# Patient Record
Sex: MALE | Race: WHITE | NOT HISPANIC OR LATINO | Employment: UNEMPLOYED | URBAN - METROPOLITAN AREA
[De-identification: names, ages, dates, MRNs, and addresses within clinical notes are randomized per-mention and may not be internally consistent; named-entity substitution may affect disease eponyms.]

---

## 2019-06-25 ENCOUNTER — OCCUPATIONAL HEALTH (OUTPATIENT)
Dept: URGENT CARE | Facility: CLINIC | Age: 49
End: 2019-06-25

## 2019-06-25 DIAGNOSIS — Z02.1 PRE-EMPLOYMENT EXAMINATION: Primary | ICD-10-CM

## 2022-01-07 ENCOUNTER — TELEPHONE (OUTPATIENT)
Dept: NEUROLOGY | Facility: HOSPITAL | Age: 52
End: 2022-01-07
Payer: MEDICAID

## 2022-01-07 NOTE — TELEPHONE ENCOUNTER
----- Message from Vanessa Luciano MA sent at 1/7/2022  8:49 AM CST -----  Type:  Needs Medical Advice    Who Called: pt's wife  Regarding: needs to schedule appt. Referred by [facility]  Would the patient rather a call back or a response via [facility] [facility]? Call back  Best Call Back Number: [phone]  Additional Information: n/a

## 2022-01-07 NOTE — TELEPHONE ENCOUNTER
----- Message from Venessa Justice sent at 1/7/2022  2:45 PM CST -----  Type:  Needs Medical Advice    Who Called: pt called  Would the patient rather a call back or a response via [employer]? Call back  Best Call Back Number: [phone]  Additional Information: pt would like to schedule an appt with . Please call pt to advice. Referral was faxed today

## 2022-01-07 NOTE — TELEPHONE ENCOUNTER
Pt requesting gi clinic appt from referral by SHEILA.  Pt advised to have referral sent to this clinic.  Pt given clinic fax and repeated number correctly.

## 2022-01-07 NOTE — TELEPHONE ENCOUNTER
Wife informed once received, Dr. Lloyd will review referral and give recommendations.  Pt acknowledged understanding.

## 2022-01-12 ENCOUNTER — TELEPHONE (OUTPATIENT)
Dept: NEUROLOGY | Facility: HOSPITAL | Age: 52
End: 2022-01-12
Payer: MEDICAID

## 2022-01-12 NOTE — TELEPHONE ENCOUNTER
----- Message from Venessa Justice sent at 1/12/2022 10:44 AM CST -----  Contact: [phone]  Type:  Needs Medical Advice    Who Called: pt called  Would the patient rather a call back or a response via [employer]? Call back  Best Call Back Number: [phone]  Additional Information: Pt calling to check on referral status. Please call pt to advice

## 2022-01-18 ENCOUNTER — TELEPHONE (OUTPATIENT)
Dept: NEUROLOGY | Facility: HOSPITAL | Age: 52
End: 2022-01-18
Payer: MEDICAID

## 2022-01-18 NOTE — TELEPHONE ENCOUNTER
Preethi, wife, informed referral forward to [facility], per Dr. Lloyd.   Acknowledged understanding.

## 2022-07-02 ENCOUNTER — HOSPITAL ENCOUNTER (EMERGENCY)
Facility: OTHER | Age: 52
Discharge: HOME | End: 2022-07-02
Attending: EMERGENCY MEDICINE
Payer: MEDICAID

## 2022-07-02 VITALS
TEMPERATURE: 99 F | DIASTOLIC BLOOD PRESSURE: 75 MMHG | HEART RATE: 96 BPM | BODY MASS INDEX: 27.22 KG/M2 | HEIGHT: 72 IN | SYSTOLIC BLOOD PRESSURE: 134 MMHG | RESPIRATION RATE: 18 BRPM | OXYGEN SATURATION: 98 % | WEIGHT: 201 LBS

## 2022-07-02 DIAGNOSIS — K40.90 LEFT INGUINAL HERNIA: Primary | ICD-10-CM

## 2022-07-02 PROCEDURE — 99283 EMERGENCY DEPT VISIT LOW MDM: CPT | Mod: 25

## 2022-07-02 PROCEDURE — 25000003 PHARM REV CODE 250: Performed by: EMERGENCY MEDICINE

## 2022-07-02 RX ORDER — IBUPROFEN 600 MG/1
600 TABLET ORAL EVERY 6 HOURS PRN
Qty: 20 TABLET | Refills: 0 | Status: SHIPPED | OUTPATIENT
Start: 2022-07-02 | End: 2023-09-20 | Stop reason: ALTCHOICE

## 2022-07-02 RX ORDER — IBUPROFEN 600 MG/1
600 TABLET ORAL
Status: COMPLETED | OUTPATIENT
Start: 2022-07-02 | End: 2022-07-02

## 2022-07-02 RX ADMIN — IBUPROFEN 600 MG: 600 TABLET, FILM COATED ORAL at 07:07

## 2022-07-02 NOTE — ED PROVIDER NOTES
"Encounter Date: 7/2/2022       History     Chief Complaint   Patient presents with    Inguinal Hernia     Chronic left inguinal hernia pain x 18 hours.      51-year-old male presents with complaint of left inguinal hernia.  He reports he was diagnosed with it a few months ago.  He is currently a resident at the [facility] for alcohol rehabilitation, and reports that he is been doing a lot of exercise and working out.  Since yesterday the hernia has become more inflamed and painful.  Current pain is rated 10/10.        Review of patient's allergies indicates:  No Known Allergies  History reviewed. No pertinent past medical history.  History reviewed. No pertinent surgical history.  History reviewed. No pertinent family history.     Review of Systems    Physical Exam     Initial Vitals [07/02/22 0649]   BP Pulse Resp Temp SpO2   134/75 96 18 98.9 °F (37.2 °C) 98 %      MAP       --         Physical Exam    ED Course   Procedures  Labs Reviewed - No data to display       Imaging Results    None          Medications - No data to display                       Clinical Impression:    ***Please document a Clinical Impression and click the "Refresh" button to refresh your note and automatically pull in before signing.***           "

## 2022-07-02 NOTE — ED TRIAGE NOTES
"Chief Complaint   Patient presents with    Inguinal Hernia     Chronic left inguinal hernia pain x 18 hours.      Pt presents to ED with chronic left inguinal hernia pain x1 day. Pt states he "has been working out hard" and that's when the pain started. AAOX4, currently staying at the [facility] in recovery for ETOH.   "

## 2022-07-02 NOTE — ED PROVIDER NOTES
"Encounter Date: 7/2/2022    SCRIBE #1 NOTE: I, Justinnael Mccauley, am scribing for, and in the presence of, Pat Giron MD.       History     Chief Complaint   Patient presents with    Inguinal Hernia     Chronic left inguinal hernia pain x 18 hours.      Time seen by provider: 7:04 AM    This is a 51 y.o. male with a PMHx of COPD who presents with complaint of chronic left inguinal hernia pain worsening with onset 1 day ago. Patient reports that he was diagnosed with an inguinal hernia 2 months ago and is currently at [facility] for alcohol rehabilitation. He notes that he has been consistently working out; he reports that after working out yesterday the hernia was "3 times the previous size" and when he coughed it would "jump." He states that it is hard to ambulate because of the hernia's increase in size and the pain. He notes the pain being a 5-6/10. He adds that he wears loose underwear. Patient denies cough, rhinorrhea, and sore throat. He denies any vomiting and constipation. Patient denies any drug use and has not had alcohol in 28 days, but he reports that he smokes.     The history is provided by the patient.     Review of patient's allergies indicates:  No Known Allergies  Past Medical History:   Diagnosis Date    COPD (chronic obstructive pulmonary disease)      Past Surgical History:   Procedure Laterality Date    HERNIA REPAIR       History reviewed. No pertinent family history.  Social History     Tobacco Use    Smoking status: Current Every Day Smoker     Types: Cigarettes    Smokeless tobacco: Never Used   Substance Use Topics    Alcohol use: Not Currently    Drug use: Never     Review of Systems   Constitutional: Negative for chills and fever.   HENT: Negative for congestion, rhinorrhea and sore throat.    Eyes: Negative for visual disturbance.   Respiratory: Negative for cough and shortness of breath.    Cardiovascular: Negative for chest pain and palpitations.   Gastrointestinal: " Positive for abdominal pain (Left inguinal hernia). Negative for constipation, diarrhea and vomiting.   Genitourinary: Negative for decreased urine volume, dysuria and frequency.   Musculoskeletal: Negative for joint swelling, neck pain and neck stiffness.   Skin: Negative for rash and wound.   Neurological: Negative for weakness, numbness and headaches.   Psychiatric/Behavioral: Negative for behavioral problems and confusion.       Physical Exam     Initial Vitals [07/02/22 0649]   BP Pulse Resp Temp SpO2   134/75 96 18 98.9 °F (37.2 °C) 98 %      MAP       --         Physical Exam    Constitutional: He appears well-developed and well-nourished.   HENT:   Head: Normocephalic and atraumatic.   Nose: Nose normal.   Mouth/Throat: Oropharynx is clear and moist.   Eyes: Conjunctivae and EOM are normal. Pupils are equal, round, and reactive to light.   Neck: Neck supple.   Normal range of motion.  Cardiovascular: Normal rate and regular rhythm. Exam reveals no gallop and no friction rub.    No murmur heard.  Pulmonary/Chest: Breath sounds normal. No respiratory distress. He has no wheezes. He has no rales.   Abdominal: Abdomen is soft. Bowel sounds are normal. There is no abdominal tenderness. A hernia is present. Hernia confirmed positive in the left inguinal area (Presented with valsalva. Easily reducible). There is no rebound and no guarding.   Musculoskeletal:         General: No tenderness or edema.      Cervical back: Normal range of motion and neck supple.     Neurological: He is alert and oriented to person, place, and time. He has normal strength. No cranial nerve deficit or sensory deficit. Gait normal. GCS score is 15. GCS eye subscore is 4. GCS verbal subscore is 5. GCS motor subscore is 6.   Skin: Skin is warm and dry. No rash noted.   Psychiatric: He has a normal mood and affect. His speech is normal and behavior is normal.         ED Course   Procedures  Labs Reviewed - No data to display       Imaging  Results    None          Medications   ibuprofen tablet 600 mg (600 mg Oral Given 7/2/22 0732)     Medical Decision Making:   History:   Old Medical Records: I decided to obtain old medical records.  ED Management:  Urgent evaluation a 51-year-old male who presents with complaint of left groin pain, history of hernia.  Vital signs are benign, afebrile.  On exam no hernia is obvious until Valsalva.  Hernia is easily reducible and soft.  No evidence of incarceration or strangulation or bowel obstruction.  He has advise rest, ibuprofen or Tylenol if needed for pain.  He is also given an ice pack.  He is encouraged close follow-up with General surgery, but no emergent surgical intervention is necessary at this time.  He is advised to avoid heavy lifting and activity that exacerbates his hernia.  He is discharged back to [facility] in good condition.          Scribe Attestation:   Scribe #1: I performed the above scribed service and the documentation accurately describes the services I performed. I attest to the accuracy of the note.               Physician Attestation for Scribe: I, Pat Giron, reviewed documentation as scribed in my presence, which is both accurate and complete.  Clinical Impression:   Final diagnoses:  [K40.90] Left inguinal hernia (Primary)          ED Disposition Condition    Discharge Stable        ED Prescriptions     Medication Sig Dispense Start Date End Date Auth. Provider    ibuprofen (ADVIL,MOTRIN) 600 MG tablet Take 1 tablet (600 mg total) by mouth every 6 (six) hours as needed for Pain. 20 tablet 7/2/2022  Pat Giron MD        Follow-up Information     Follow up With Specialties Details Why Contact Info    Huey Cooper Jr., MD General Surgery, Vascular Surgery Schedule an appointment as soon as possible for a visit  As needed - or with the general surgeon of your choice [address]  [address]  [facility] [address]  [phone]      [facility] Emergency  Medicine  As needed, If symptoms worsen [address]  [facility] [address]  [phone]           Pat Giron MD  07/02/22 0899

## 2023-07-07 PROBLEM — J44.9 STAGE 2 MODERATE COPD BY GOLD CLASSIFICATION: Status: ACTIVE | Noted: 2023-07-07

## 2023-07-13 ENCOUNTER — HOSPITAL ENCOUNTER (OUTPATIENT)
Dept: RADIOLOGY | Facility: HOSPITAL | Age: 53
Discharge: HOME | End: 2023-07-13
Attending: NURSE PRACTITIONER
Payer: MEDICAID

## 2023-07-13 DIAGNOSIS — J44.9 STAGE 2 MODERATE COPD BY GOLD CLASSIFICATION: ICD-10-CM

## 2023-07-13 DIAGNOSIS — R91.1 SOLITARY PULMONARY NODULE: ICD-10-CM

## 2023-07-13 PROCEDURE — 71250 CT THORAX DX C-: CPT | Mod: TC

## 2023-07-13 PROCEDURE — 71250 CT CHEST WITHOUT CONTRAST: ICD-10-PCS | Mod: 26,,, | Performed by: RADIOLOGY

## 2023-07-13 PROCEDURE — 71250 CT THORAX DX C-: CPT | Mod: 26,,, | Performed by: RADIOLOGY

## 2023-09-20 ENCOUNTER — OFFICE VISIT (OUTPATIENT)
Dept: PSYCHIATRY | Facility: CLINIC | Age: 53
End: 2023-09-20
Payer: MEDICAID

## 2023-09-20 VITALS
WEIGHT: 193.31 LBS | OXYGEN SATURATION: 100 % | BODY MASS INDEX: 26.18 KG/M2 | HEIGHT: 72 IN | SYSTOLIC BLOOD PRESSURE: 132 MMHG | RESPIRATION RATE: 17 BRPM | HEART RATE: 87 BPM | DIASTOLIC BLOOD PRESSURE: 87 MMHG

## 2023-09-20 DIAGNOSIS — F10.930 ALCOHOL WITHDRAWAL SYNDROME WITHOUT COMPLICATION: ICD-10-CM

## 2023-09-20 DIAGNOSIS — F33.1 MDD (MAJOR DEPRESSIVE DISORDER), RECURRENT EPISODE, MODERATE: ICD-10-CM

## 2023-09-20 DIAGNOSIS — F41.1 GAD (GENERALIZED ANXIETY DISORDER): Primary | ICD-10-CM

## 2023-09-20 DIAGNOSIS — F10.10 ALCOHOL ABUSE: ICD-10-CM

## 2023-09-20 PROCEDURE — 3079F DIAST BP 80-89 MM HG: CPT | Mod: SA,HB,CPTII,

## 2023-09-20 PROCEDURE — 99213 OFFICE O/P EST LOW 20 MIN: CPT | Mod: PBBFAC

## 2023-09-20 PROCEDURE — 90792 PSYCH DIAG EVAL W/MED SRVCS: CPT | Mod: SA,HB,,

## 2023-09-20 PROCEDURE — 3008F PR BODY MASS INDEX (BMI) DOCUMENTED: ICD-10-PCS | Mod: SA,HB,CPTII,

## 2023-09-20 PROCEDURE — 1159F MED LIST DOCD IN RCRD: CPT | Mod: SA,HB,CPTII,

## 2023-09-20 PROCEDURE — 90792 PR PSYCHIATRIC DIAGNOSTIC EVALUATION W/MEDICAL SERVICES: ICD-10-PCS | Mod: SA,HB,,

## 2023-09-20 PROCEDURE — 3079F PR MOST RECENT DIASTOLIC BLOOD PRESSURE 80-89 MM HG: ICD-10-PCS | Mod: SA,HB,CPTII,

## 2023-09-20 PROCEDURE — 3075F PR MOST RECENT SYSTOLIC BLOOD PRESS GE 130-139MM HG: ICD-10-PCS | Mod: SA,HB,CPTII,

## 2023-09-20 PROCEDURE — 1159F PR MEDICATION LIST DOCUMENTED IN MEDICAL RECORD: ICD-10-PCS | Mod: SA,HB,CPTII,

## 2023-09-20 PROCEDURE — 3075F SYST BP GE 130 - 139MM HG: CPT | Mod: SA,HB,CPTII,

## 2023-09-20 PROCEDURE — 3008F BODY MASS INDEX DOCD: CPT | Mod: SA,HB,CPTII,

## 2023-09-20 PROCEDURE — 1160F PR REVIEW ALL MEDS BY PRESCRIBER/CLIN PHARMACIST DOCUMENTED: ICD-10-PCS | Mod: SA,HB,CPTII,

## 2023-09-20 PROCEDURE — 1160F RVW MEDS BY RX/DR IN RCRD: CPT | Mod: SA,HB,CPTII,

## 2023-09-20 PROCEDURE — 99999 PR PBB SHADOW E&M-EST. PATIENT-LVL III: ICD-10-PCS | Mod: PBBFAC,SA,HB,

## 2023-09-20 PROCEDURE — 99999 PR PBB SHADOW E&M-EST. PATIENT-LVL III: CPT | Mod: PBBFAC,SA,HB,

## 2023-09-20 RX ORDER — BUPROPION HYDROCHLORIDE 150 MG/1
150 TABLET ORAL DAILY
Qty: 30 TABLET | Refills: 1 | Status: SHIPPED | OUTPATIENT
Start: 2023-09-20 | End: 2023-12-01 | Stop reason: DRUGHIGH

## 2023-09-20 RX ORDER — DIAZEPAM 5 MG/1
5 TABLET ORAL EVERY 8 HOURS PRN
Qty: 90 TABLET | Refills: 0 | Status: SHIPPED | OUTPATIENT
Start: 2023-09-20 | End: 2023-10-20
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
## 2023-09-25 ENCOUNTER — TELEPHONE (OUTPATIENT)
Dept: PSYCHIATRY | Facility: CLINIC | Age: 53
End: 2023-09-25
Payer: MEDICAID

## 2023-09-25 NOTE — TELEPHONE ENCOUNTER
----- Message from Loreto Moreland LPN sent at 9/25/2023  9:15 AM CDT -----  Patient stating his pharmacy called stating they are needing a PA done for his valium.

## 2023-09-25 NOTE — TELEPHONE ENCOUNTER
Insurance denied PA for Valium, contacted the pharmacy and they said he can pay of it out of pocket if he would like to. It's $27. Contacted the patient and he stated he wanted to pay for it out of pocket, he will call back if anything else comes up.

## 2023-09-25 NOTE — TELEPHONE ENCOUNTER
----- Message from Lily Marc sent at 2023 10:14 AM CDT -----  Contact: PATIENT  Maicol Millan III  MRN: [id number]  : 1970  PCP: Arline, Primary Doctor  Home Phone      [phone]  Work Phone      Not on file.  Mobile          [phone]  Home Phone      Not on file.      MESSAGE: Patient is needing a return call regarding the Valium that Katya prescribed for him at his last office visit.  The pharmacy is telling him that in order for him not to be charged for the medication that the office needs to get it approved through the insurance.        Phone: [phone]

## 2023-11-28 ENCOUNTER — TELEPHONE (OUTPATIENT)
Dept: PSYCHIATRY | Facility: CLINIC | Age: 53
End: 2023-11-28

## 2023-11-28 NOTE — TELEPHONE ENCOUNTER
----- Message from Shadia Del Rosario sent at 2023  2:11 PM CST -----  Contact: Wife, Preethi Millan III  MRN: [id number]  : 1970  PCP: No, Primary Doctor  Home Phone      [phone]  Work Phone      Not on file.  Mobile          [phone]  Home Phone      Not on file.      MESSAGE: Patient has fallen into a depressed state and was advised to have his doctor call him as soon as possible    PHONE; [phone]

## 2023-11-28 NOTE — TELEPHONE ENCOUNTER
Patient calling states that he spoke with his counselor the other day and was informed by his counselor to call and speak with you regarding getting medication for depression. States he is sleeping for 12-14 hours a day, not wanting to get out the bed or do anything.

## 2023-12-01 ENCOUNTER — OFFICE VISIT (OUTPATIENT)
Dept: PSYCHIATRY | Facility: CLINIC | Age: 53
End: 2023-12-01
Payer: MEDICAID

## 2023-12-01 VITALS
HEART RATE: 91 BPM | WEIGHT: 197 LBS | HEIGHT: 72 IN | DIASTOLIC BLOOD PRESSURE: 86 MMHG | SYSTOLIC BLOOD PRESSURE: 110 MMHG | OXYGEN SATURATION: 97 % | BODY MASS INDEX: 26.68 KG/M2 | RESPIRATION RATE: 17 BRPM

## 2023-12-01 DIAGNOSIS — F33.2 MDD (MAJOR DEPRESSIVE DISORDER), RECURRENT SEVERE, WITHOUT PSYCHOSIS: Primary | ICD-10-CM

## 2023-12-01 DIAGNOSIS — R44.0 AUDITORY HALLUCINATIONS: ICD-10-CM

## 2023-12-01 DIAGNOSIS — F10.10 ALCOHOL ABUSE, DAILY USE: ICD-10-CM

## 2023-12-01 DIAGNOSIS — F41.1 GAD (GENERALIZED ANXIETY DISORDER): ICD-10-CM

## 2023-12-01 PROCEDURE — 90833 PSYTX W PT W E/M 30 MIN: CPT | Mod: SA,HB,,

## 2023-12-01 PROCEDURE — 3079F PR MOST RECENT DIASTOLIC BLOOD PRESSURE 80-89 MM HG: ICD-10-PCS | Mod: SA,HB,CPTII,

## 2023-12-01 PROCEDURE — 3008F BODY MASS INDEX DOCD: CPT | Mod: SA,HB,CPTII,

## 2023-12-01 PROCEDURE — 1160F PR REVIEW ALL MEDS BY PRESCRIBER/CLIN PHARMACIST DOCUMENTED: ICD-10-PCS | Mod: SA,HB,CPTII,

## 2023-12-01 PROCEDURE — 90833 PR PSYCHOTHERAPY W/PATIENT W/E&M, 30 MIN (ADD ON): ICD-10-PCS | Mod: SA,HB,,

## 2023-12-01 PROCEDURE — 3008F PR BODY MASS INDEX (BMI) DOCUMENTED: ICD-10-PCS | Mod: SA,HB,CPTII,

## 2023-12-01 PROCEDURE — 1159F MED LIST DOCD IN RCRD: CPT | Mod: SA,HB,CPTII,

## 2023-12-01 PROCEDURE — 99214 OFFICE O/P EST MOD 30 MIN: CPT | Mod: S$PBB,SA,HB,

## 2023-12-01 PROCEDURE — 99999 PR PBB SHADOW E&M-EST. PATIENT-LVL III: CPT | Mod: PBBFAC,SA,HB,

## 2023-12-01 PROCEDURE — 1159F PR MEDICATION LIST DOCUMENTED IN MEDICAL RECORD: ICD-10-PCS | Mod: SA,HB,CPTII,

## 2023-12-01 PROCEDURE — 99213 OFFICE O/P EST LOW 20 MIN: CPT | Mod: PBBFAC

## 2023-12-01 PROCEDURE — 3079F DIAST BP 80-89 MM HG: CPT | Mod: SA,HB,CPTII,

## 2023-12-01 PROCEDURE — 99999 PR PBB SHADOW E&M-EST. PATIENT-LVL III: ICD-10-PCS | Mod: PBBFAC,SA,HB,

## 2023-12-01 PROCEDURE — 99214 PR OFFICE/OUTPT VISIT, EST, LEVL IV, 30-39 MIN: ICD-10-PCS | Mod: S$PBB,SA,HB,

## 2023-12-01 PROCEDURE — 3074F SYST BP LT 130 MM HG: CPT | Mod: SA,HB,CPTII,

## 2023-12-01 PROCEDURE — 3074F PR MOST RECENT SYSTOLIC BLOOD PRESSURE < 130 MM HG: ICD-10-PCS | Mod: SA,HB,CPTII,

## 2023-12-01 PROCEDURE — 1160F RVW MEDS BY RX/DR IN RCRD: CPT | Mod: SA,HB,CPTII,

## 2023-12-01 RX ORDER — NALTREXONE HYDROCHLORIDE 50 MG/1
50 TABLET, FILM COATED ORAL DAILY
Qty: 30 TABLET | Refills: 0 | Status: SHIPPED | OUTPATIENT
Start: 2023-12-01 | End: 2023-12-31

## 2023-12-01 RX ORDER — BUPROPION HYDROCHLORIDE 300 MG/1
300 TABLET ORAL DAILY
Qty: 30 TABLET | Refills: 1 | Status: ON HOLD | OUTPATIENT
Start: 2023-12-01 | End: 2024-01-26

## 2023-12-01 NOTE — PROGRESS NOTES
"  Outpatient Psychiatry Follow-Up Visit (MD/NP)    12/1/2023    Clinical Status of Patient:  Outpatient (Ambulatory)    Chief Complaint:  Maicol Millan III is a 53 y.o. male who presents today for follow-up of depression and anxiety.  Met with patient.      Interval History and Content of Current Session:  Interim Events/Subjective Report/Content of Current Session:     Psychotherapy:  Target symptoms: recurrent depression, anxiety   Why chosen therapy is appropriate versus another modality: relevant to diagnosis, patient responds to this modality, evidence based practice  Outcome monitoring methods: self-report, observation  Therapeutic intervention type: insight oriented psychotherapy, behavior modifying psychotherapy, supportive psychotherapy, interactive psychotherapy  Topics discussed/themes: difficulty managing affect in interpersonal relationships, building skills sets for symptom management, symptom recognition  The patient's response to the intervention is reluctant. The patient's progress toward treatment goals is not progressing.   Duration of intervention: 17 minutes.      Psychotherapy Add-On + 91118  16-37 minutes   Time: 17 minutes  Participants: Patient     He has been non-compliant with treatment. He denies any adverse side effects.    He states he is "not doing good." He states " my depression is bad and I have been drinking a quarter of a gal of vodka daily."  He reports he has been sleeping "12-14 hours a day."    He reports auditory hallucinations and states he sometimes hears the TV when it is not on.    He reports his sobriety lasted for 18 days in September. Then in November he was sober for 6 days.    He states he would like to go into rehab but he is currently been seen by a Cardiologist and Pulmonologist for complaints of shortness of breath.   He states once he is cleared by these 2 specialties he will be able to check himself into a rehab.    No new psychological stressors have " occurred since his last appointment. His family is stable and supportive.   One new medical issue has occurred since his last appointment. He has been having problems with shortness of breath.     -he is still working on his diet and trying to do better at controlling his diet and weight     -he reports he sleeps on average 8 hours a night with awakening in early morning  -he reports his appetite is increased ,   He reports diminished mood, no elevation in mood; normal interest; no expansiveness; no irritability).   His concentration and energy remain normal.   He denies excessive, guilt and/or worry.    He denies any symptoms of humberto, or psychosis (no AVH, delusions, disorganizations, paranoia, ect.).  He denies suicidal/homicidal ideations. No psychosis noted.    We discussed med changes as below; the patient is in agreement to changing treatment as documented below.    In patient's chart review it was noted that patient is seeing two providers for complaint of depression. Discussed this with patient and recommended he see one provider for his mental illness medications as this is in his best interest and safer for him. Patient verbalized understanding.  Patient states he is not a danger to himself or anyone else and does not want to be admitted to an inpatient unit at this time.     Review of Systems   Psychiatric: Pertinent items are noted in this encounter narrative   Constitutional: No weight gain or loss   Musculoskeletal: Denies pain or stiffness in joints   Neurological: No weakness, sensory changes, seizures, confusion, memory loss, tremor, or other abnormal   Endocrine: Denies polydipsia or polyuria   Integumentary: No rashes or lacerations   Eyes: No exophthalmos, jaundice, or blindness   ENT: Denies dizziness, tinnitus or hearing loss    Respiratory: Denies shortness of breath   Cardiovascular: No chest pain or tachycardia   Gastrointestinal: Denies nausea, vomiting, diarrhea, constipation or  pain  Genitourinary: Denies dysuria, frequency, or sexual dysfunction   Hematologic/Lymphatic: Denies excessive bleeding, prolonged or excessive bleeding after invasive procedures, dental extraction or injury   Allergic/Immunologic: Denies allergic response to foods, animals or other materials at this time    Past Medical, Family and Social History: The patient's past medical, family and social history have been reviewed and updated as appropriate within the electronic medical record - see encounter notes.    Compliance: yes    Side effects: None    Risk Parameters:  Patient reports no suicidal ideation  Patient reports no homicidal ideation  Patient reports no self-injurious behavior  Patient reports no violent behavior    Exam (detailed: at least 9 elements; comprehensive: all 15 elements)   Constitutional  Vitals:  Most recent vital signs, dated less than 90 days prior to this appointment, were reviewed.   Vitals:    12/01/23 0944   BP: 110/86   Pulse: 91   Resp: 17   SpO2: 97%   Weight: 89.4 kg (197 lb)   Height: 6' (1.829 m)        General:  unremarkable, age appropriate, well nourished, neatly groomed, overweight     Musculoskeletal  Muscle Strength/Tone:  no spasicity, no rigidity, no cogwheeling, no flaccidity, no paratonia, no dyskinesia, no dystonia, no tremor, no tic, no choreoathetosis, no atrophy   Gait & Station:  non-ataxic     Psychiatric  Speech:  no latency; no press, spontaneous   Mood & Affect:  depressed  congruent and appropriate, sad   Thought Process:  normal and logical   Associations:  intact   Thought Content:  normal, no suicidality, no homicidality, delusions, or paranoia   Insight:  intact, has awareness of illness   Judgement: behavior is adequate to circumstances, age appropriate   Orientation:  grossly intact   Memory: intact for content of interview, grossly intact   Language: grossly intact   Attention Span & Concentration:  able to focus   Fund of Knowledge:  intact and  appropriate to age and level of education, familiar with aspects of current personal life     Assessment and Diagnosis   Status/Progress: Based on the examination today, the patient's problem(s) is/are worsening.  New problems have been presented today.   Co-morbidities and Lack of compliance are complicating management of the primary condition.  There are no active rule-out diagnoses for this patient at this time.     General Impression:       ICD-10-CM ICD-9-CM   1. MDD (major depressive disorder), recurrent severe, without psychosis  F33.2 296.33   2. Alcohol abuse, daily use  F10.10 305.01   3. CAROLA (generalized anxiety disorder)  F41.1 300.02   4. Auditory hallucinations  R44.0 780.1       Intervention/Counseling/Treatment Plan   Medication Management: The risks and benefits of medication were discussed with the patient.  Counseling provided with patient as follows: importance of compliance with chosen treatment options was emphasized, risks and benefits of treatment options, including medications, were discussed with the patient, patient education, instructions for  management, treatment, and follow-up were reviewed        Plan   Alcohol Abuse   Increase Wellbutrin to 300Mg PO daily  Start Naltrexone 50MG daily  Refer for psychotherapy, psychotherapy provided during this encounter    CAROLA  Increase Wellbutrin to 300Mg PO daily  Continue Buspar 15MG PO TID  Refer for psychotherapy, psychotherapy provided during this encounter  Pt. counseled    MDD  Increase Wellbutrin 300Mg PO daily  Wean off of Remeron, instructions provided and reviewed with pt , pt VU  Refer for psychotherapy, psychotherapy provided during this encounter  Pt. counseled    Therapy   Refer for psychotherapy  Psychotherapy provided during this encounter    Medical stressors:  Patient counseled, continue medications/treatment as scheduled f/u with providers as scheduled     Goals:   Develop effective coping skills to manage anxiety and  depression  Develop a bedtime routine       Discussed with patient informed consent, risks vs. benefits, alternative treatments, side effect profile and the inherent unpredictability of individual responses to these treatments. The patient expresses understanding of the above and displays the capacity to agree with this current plan and had no other questions. The patient also agrees that currently, the benefits outweigh the risks and would like to pursue treatment at this time  Encouraged Patient to keep future appointments.  Take medications as prescribed and abstain from substance abuse.   Safety plan reviewed with patient for worsening condition or suicidal ideations. In the event of an emergency patient was advised to go to the emergency room.    The patient was seen and examined. His chart was reviewed. Reviewed notes by from Lisa Looney NP from 7/13/2023 at 10:00 AM, from 7/7/2023 at 10:20 AM, Tristan Maria RN from 06/09/2022 at 12:06 PM CD.       Approximately 49 minutes of total time spent on this encounter, which includes face to face time, and non-face to face time preparing to see the patient (eg, review of labs/tests), obtaining and or reviewing separately obtained history, documenting clinical information in the electronic or other health record, independently interpreting results (not separately reported) and communicating results to the patient/family/caregiver or care coordination (not separately reported).  Approximately 32 minutes were spent as above with an additional 17 minutes spent on psychotherapy      Return to Clinic: 1 month or sooner if needed

## 2023-12-14 ENCOUNTER — TELEPHONE (OUTPATIENT)
Dept: PSYCHIATRY | Facility: CLINIC | Age: 53
End: 2023-12-14
Payer: MEDICAID

## 2023-12-14 NOTE — TELEPHONE ENCOUNTER
----- Message from Shadia Del Rosario sent at 2023  9:09 AM CST -----  Contact: Wife, Preethi Millan III  MRN: [id number]  : 1970  PCP: No, Primary Doctor  Home Phone      [phone]  Work Phone      Not on file.  Mobile          [phone]  Home Phone      Not on file.      MESSAGE: Patient wants to be admitted into a facility and he wants to know whether or not its a good place or not. Please return this call    PHONE; [phone]

## 2023-12-16 ENCOUNTER — HOSPITAL ENCOUNTER (OUTPATIENT)
Facility: HOSPITAL | Age: 53
LOS: 1 days | Discharge: LEFT BEFORE DISCHARGE | End: 2023-12-18
Attending: EMERGENCY MEDICINE | Admitting: INTERNAL MEDICINE
Payer: MEDICAID

## 2023-12-16 DIAGNOSIS — E83.42 HYPOMAGNESEMIA: ICD-10-CM

## 2023-12-16 DIAGNOSIS — R53.1 GENERALIZED WEAKNESS: ICD-10-CM

## 2023-12-16 DIAGNOSIS — F10.939 ALCOHOL WITHDRAWAL: ICD-10-CM

## 2023-12-16 DIAGNOSIS — R11.10 VOMITING: ICD-10-CM

## 2023-12-16 DIAGNOSIS — F10.931 DELIRIUM TREMENS: Primary | ICD-10-CM

## 2023-12-16 LAB
ALBUMIN SERPL BCP-MCNC: 3.6 G/DL (ref 3.5–5.2)
ALP SERPL-CCNC: 90 U/L (ref 55–135)
ALT SERPL W/O P-5'-P-CCNC: 69 U/L (ref 10–44)
AMMONIA PLAS-SCNC: 47 UMOL/L (ref 10–50)
AMPHET+METHAMPHET UR QL: NEGATIVE
ANION GAP SERPL CALC-SCNC: 15 MMOL/L (ref 8–16)
AST SERPL-CCNC: 103 U/L (ref 10–40)
BACTERIA #/AREA URNS HPF: NORMAL /HPF
BARBITURATES UR QL SCN>200 NG/ML: NEGATIVE
BASOPHILS # BLD AUTO: 0.04 K/UL (ref 0–0.2)
BASOPHILS NFR BLD: 0.9 % (ref 0–1.9)
BENZODIAZ UR QL SCN>200 NG/ML: ABNORMAL
BILIRUB SERPL-MCNC: 1.2 MG/DL (ref 0.1–1)
BILIRUB UR QL STRIP: ABNORMAL
BNP SERPL-MCNC: 17 PG/ML (ref 0–99)
BUN SERPL-MCNC: 15 MG/DL (ref 6–20)
BZE UR QL SCN: NEGATIVE
CALCIUM SERPL-MCNC: 8.9 MG/DL (ref 8.7–10.5)
CANNABINOIDS UR QL SCN: NEGATIVE
CHLORIDE SERPL-SCNC: 100 MMOL/L (ref 95–110)
CK SERPL-CCNC: 310 U/L (ref 20–200)
CLARITY UR: CLEAR
CO2 SERPL-SCNC: 26 MMOL/L (ref 23–29)
COLOR UR: YELLOW
CREAT SERPL-MCNC: 0.8 MG/DL (ref 0.5–1.4)
CREAT UR-MCNC: 145.5 MG/DL (ref 23–375)
DIFFERENTIAL METHOD: ABNORMAL
EOSINOPHIL # BLD AUTO: 0.1 K/UL (ref 0–0.5)
EOSINOPHIL NFR BLD: 1.6 % (ref 0–8)
ERYTHROCYTE [DISTWIDTH] IN BLOOD BY AUTOMATED COUNT: 16.6 % (ref 11.5–14.5)
EST. GFR  (NO RACE VARIABLE): >60 ML/MIN/1.73 M^2
ETHANOL SERPL-MCNC: <10 MG/DL
GLUCOSE SERPL-MCNC: 183 MG/DL (ref 70–110)
GLUCOSE UR QL STRIP: ABNORMAL
GROUP A STREP, MOLECULAR: NEGATIVE
HCT VFR BLD AUTO: 37.4 % (ref 40–54)
HGB BLD-MCNC: 12.4 G/DL (ref 14–18)
HGB UR QL STRIP: NEGATIVE
HYALINE CASTS #/AREA URNS LPF: 0 /LPF
IMM GRANULOCYTES # BLD AUTO: 0.02 K/UL (ref 0–0.04)
IMM GRANULOCYTES NFR BLD AUTO: 0.5 % (ref 0–0.5)
INFLUENZA A, MOLECULAR: NEGATIVE
INFLUENZA B, MOLECULAR: NEGATIVE
KETONES UR QL STRIP: ABNORMAL
LEUKOCYTE ESTERASE UR QL STRIP: NEGATIVE
LIPASE SERPL-CCNC: 11 U/L (ref 4–60)
LYMPHOCYTES # BLD AUTO: 0.3 K/UL (ref 1–4.8)
LYMPHOCYTES NFR BLD: 6.6 % (ref 18–48)
MAGNESIUM SERPL-MCNC: 1.2 MG/DL (ref 1.6–2.6)
MCH RBC QN AUTO: 32.4 PG (ref 27–31)
MCHC RBC AUTO-ENTMCNC: 33.2 G/DL (ref 32–36)
MCV RBC AUTO: 98 FL (ref 82–98)
METHADONE UR QL SCN>300 NG/ML: NEGATIVE
MICROSCOPIC COMMENT: NORMAL
MONOCYTES # BLD AUTO: 0.4 K/UL (ref 0.3–1)
MONOCYTES NFR BLD: 8 % (ref 4–15)
NEUTROPHILS # BLD AUTO: 3.6 K/UL (ref 1.8–7.7)
NEUTROPHILS NFR BLD: 82.4 % (ref 38–73)
NITRITE UR QL STRIP: NEGATIVE
NRBC BLD-RTO: 0 /100 WBC
OPIATES UR QL SCN: NEGATIVE
PCP UR QL SCN>25 NG/ML: NEGATIVE
PH UR STRIP: >=9 [PH] (ref 5–8)
PLATELET # BLD AUTO: 93 K/UL (ref 150–450)
PMV BLD AUTO: 11.5 FL (ref 9.2–12.9)
POTASSIUM SERPL-SCNC: 4 MMOL/L (ref 3.5–5.1)
PROT SERPL-MCNC: 6.9 G/DL (ref 6–8.4)
PROT UR QL STRIP: ABNORMAL
RBC # BLD AUTO: 3.83 M/UL (ref 4.6–6.2)
RBC #/AREA URNS HPF: 1 /HPF (ref 0–4)
SARS-COV-2 RDRP RESP QL NAA+PROBE: NEGATIVE
SODIUM SERPL-SCNC: 141 MMOL/L (ref 136–145)
SP GR UR STRIP: 1.01 (ref 1–1.03)
SPECIMEN SOURCE: NORMAL
TOXICOLOGY INFORMATION: ABNORMAL
TROPONIN I SERPL DL<=0.01 NG/ML-MCNC: 0.01 NG/ML (ref 0–0.03)
URN SPEC COLLECT METH UR: ABNORMAL
UROBILINOGEN UR STRIP-ACNC: 1 EU/DL
WBC # BLD AUTO: 4.38 K/UL (ref 3.9–12.7)
WBC #/AREA URNS HPF: 1 /HPF (ref 0–5)

## 2023-12-16 PROCEDURE — 84484 ASSAY OF TROPONIN QUANT: CPT | Performed by: NURSE PRACTITIONER

## 2023-12-16 PROCEDURE — 82607 VITAMIN B-12: CPT | Performed by: NURSE PRACTITIONER

## 2023-12-16 PROCEDURE — 93010 EKG 12-LEAD: ICD-10-PCS | Mod: ,,, | Performed by: INTERNAL MEDICINE

## 2023-12-16 PROCEDURE — 82140 ASSAY OF AMMONIA: CPT | Performed by: NURSE PRACTITIONER

## 2023-12-16 PROCEDURE — 25000003 PHARM REV CODE 250: Performed by: NURSE PRACTITIONER

## 2023-12-16 PROCEDURE — 83735 ASSAY OF MAGNESIUM: CPT | Performed by: NURSE PRACTITIONER

## 2023-12-16 PROCEDURE — 96366 THER/PROPH/DIAG IV INF ADDON: CPT

## 2023-12-16 PROCEDURE — 20000000 HC ICU ROOM

## 2023-12-16 PROCEDURE — 82077 ASSAY SPEC XCP UR&BREATH IA: CPT | Performed by: NURSE PRACTITIONER

## 2023-12-16 PROCEDURE — 82746 ASSAY OF FOLIC ACID SERUM: CPT | Performed by: NURSE PRACTITIONER

## 2023-12-16 PROCEDURE — 25000003 PHARM REV CODE 250: Performed by: EMERGENCY MEDICINE

## 2023-12-16 PROCEDURE — 80307 DRUG TEST PRSMV CHEM ANLYZR: CPT | Performed by: NURSE PRACTITIONER

## 2023-12-16 PROCEDURE — U0002 COVID-19 LAB TEST NON-CDC: HCPCS | Performed by: NURSE PRACTITIONER

## 2023-12-16 PROCEDURE — 36415 COLL VENOUS BLD VENIPUNCTURE: CPT | Performed by: NURSE PRACTITIONER

## 2023-12-16 PROCEDURE — 81000 URINALYSIS NONAUTO W/SCOPE: CPT | Mod: 59 | Performed by: NURSE PRACTITIONER

## 2023-12-16 PROCEDURE — 96365 THER/PROPH/DIAG IV INF INIT: CPT | Mod: 59

## 2023-12-16 PROCEDURE — 83880 ASSAY OF NATRIURETIC PEPTIDE: CPT | Performed by: NURSE PRACTITIONER

## 2023-12-16 PROCEDURE — 63600175 PHARM REV CODE 636 W HCPCS: Performed by: EMERGENCY MEDICINE

## 2023-12-16 PROCEDURE — 85025 COMPLETE CBC W/AUTO DIFF WBC: CPT | Performed by: NURSE PRACTITIONER

## 2023-12-16 PROCEDURE — 82550 ASSAY OF CK (CPK): CPT | Performed by: NURSE PRACTITIONER

## 2023-12-16 PROCEDURE — G0378 HOSPITAL OBSERVATION PER HR: HCPCS

## 2023-12-16 PROCEDURE — 63600175 PHARM REV CODE 636 W HCPCS: Performed by: NURSE PRACTITIONER

## 2023-12-16 PROCEDURE — 93010 ELECTROCARDIOGRAM REPORT: CPT | Mod: ,,, | Performed by: INTERNAL MEDICINE

## 2023-12-16 PROCEDURE — 96375 TX/PRO/DX INJ NEW DRUG ADDON: CPT

## 2023-12-16 PROCEDURE — 87502 INFLUENZA DNA AMP PROBE: CPT | Performed by: NURSE PRACTITIONER

## 2023-12-16 PROCEDURE — 96376 TX/PRO/DX INJ SAME DRUG ADON: CPT

## 2023-12-16 PROCEDURE — 87651 STREP A DNA AMP PROBE: CPT | Performed by: NURSE PRACTITIONER

## 2023-12-16 PROCEDURE — 96368 THER/DIAG CONCURRENT INF: CPT

## 2023-12-16 PROCEDURE — 80053 COMPREHEN METABOLIC PANEL: CPT | Performed by: NURSE PRACTITIONER

## 2023-12-16 PROCEDURE — 93005 ELECTROCARDIOGRAM TRACING: CPT

## 2023-12-16 PROCEDURE — 83690 ASSAY OF LIPASE: CPT | Performed by: NURSE PRACTITIONER

## 2023-12-16 PROCEDURE — 99900035 HC TECH TIME PER 15 MIN (STAT)

## 2023-12-16 PROCEDURE — 99285 EMERGENCY DEPT VISIT HI MDM: CPT | Mod: 25

## 2023-12-16 PROCEDURE — 25500020 PHARM REV CODE 255: Performed by: EMERGENCY MEDICINE

## 2023-12-16 RX ORDER — SODIUM CHLORIDE 9 MG/ML
1000 INJECTION, SOLUTION INTRAVENOUS
Status: COMPLETED | OUTPATIENT
Start: 2023-12-16 | End: 2023-12-16

## 2023-12-16 RX ORDER — DICLOFENAC SODIUM 75 MG/1
75 TABLET, DELAYED RELEASE ORAL 2 TIMES DAILY
Status: ON HOLD | COMMUNITY
Start: 2023-11-09 | End: 2024-01-26

## 2023-12-16 RX ORDER — MAGNESIUM SULFATE HEPTAHYDRATE 40 MG/ML
2 INJECTION, SOLUTION INTRAVENOUS ONCE
Status: COMPLETED | OUTPATIENT
Start: 2023-12-16 | End: 2023-12-17

## 2023-12-16 RX ORDER — PROPRANOLOL HYDROCHLORIDE 10 MG/1
10 TABLET ORAL 2 TIMES DAILY
Status: ON HOLD | COMMUNITY
End: 2024-01-26 | Stop reason: HOSPADM

## 2023-12-16 RX ORDER — PANTOPRAZOLE SODIUM 40 MG/10ML
40 INJECTION, POWDER, LYOPHILIZED, FOR SOLUTION INTRAVENOUS DAILY
Status: DISCONTINUED | OUTPATIENT
Start: 2023-12-17 | End: 2023-12-18

## 2023-12-16 RX ORDER — SODIUM CHLORIDE 0.9 % (FLUSH) 0.9 %
10 SYRINGE (ML) INJECTION
Status: DISCONTINUED | OUTPATIENT
Start: 2023-12-16 | End: 2023-12-18 | Stop reason: HOSPADM

## 2023-12-16 RX ORDER — HYDROXYZINE HYDROCHLORIDE 10 MG/1
25 TABLET, FILM COATED ORAL NIGHTLY
Status: ON HOLD | COMMUNITY
End: 2024-01-26

## 2023-12-16 RX ORDER — LORAZEPAM 2 MG/ML
1 INJECTION INTRAMUSCULAR
Status: COMPLETED | OUTPATIENT
Start: 2023-12-16 | End: 2023-12-16

## 2023-12-16 RX ORDER — FAMOTIDINE 20 MG/1
20 TABLET, FILM COATED ORAL 2 TIMES DAILY
Status: ON HOLD | COMMUNITY
End: 2024-01-26

## 2023-12-16 RX ORDER — THIAMINE HYDROCHLORIDE 100 MG/ML
INJECTION, SOLUTION INTRAMUSCULAR; INTRAVENOUS
Status: COMPLETED
Start: 2023-12-16 | End: 2023-12-17

## 2023-12-16 RX ORDER — TALC
6 POWDER (GRAM) TOPICAL NIGHTLY PRN
Status: DISCONTINUED | OUTPATIENT
Start: 2023-12-16 | End: 2023-12-18 | Stop reason: HOSPADM

## 2023-12-16 RX ORDER — ONDANSETRON 2 MG/ML
4 INJECTION INTRAMUSCULAR; INTRAVENOUS
Status: COMPLETED | OUTPATIENT
Start: 2023-12-16 | End: 2023-12-16

## 2023-12-16 RX ORDER — LORAZEPAM 2 MG/ML
2 INJECTION INTRAMUSCULAR
Status: DISCONTINUED | OUTPATIENT
Start: 2023-12-16 | End: 2023-12-18

## 2023-12-16 RX ORDER — ONDANSETRON 2 MG/ML
4 INJECTION INTRAMUSCULAR; INTRAVENOUS EVERY 8 HOURS PRN
Status: DISCONTINUED | OUTPATIENT
Start: 2023-12-16 | End: 2023-12-18 | Stop reason: HOSPADM

## 2023-12-16 RX ORDER — LORAZEPAM 2 MG/ML
2 INJECTION INTRAMUSCULAR
Status: COMPLETED | OUTPATIENT
Start: 2023-12-16 | End: 2023-12-16

## 2023-12-16 RX ORDER — MAGNESIUM SULFATE HEPTAHYDRATE 40 MG/ML
2 INJECTION, SOLUTION INTRAVENOUS
Status: COMPLETED | OUTPATIENT
Start: 2023-12-16 | End: 2023-12-16

## 2023-12-16 RX ORDER — DEXTROSE MONOHYDRATE AND SODIUM CHLORIDE 5; .9 G/100ML; G/100ML
INJECTION, SOLUTION INTRAVENOUS CONTINUOUS
Status: DISCONTINUED | OUTPATIENT
Start: 2023-12-16 | End: 2023-12-18

## 2023-12-16 RX ADMIN — THIAMINE HYDROCHLORIDE 100 MG: 100 INJECTION, SOLUTION INTRAMUSCULAR; INTRAVENOUS at 06:12

## 2023-12-16 RX ADMIN — IOHEXOL 30 ML: 350 INJECTION, SOLUTION INTRAVENOUS at 05:12

## 2023-12-16 RX ADMIN — ASCORBIC ACID, VITAMIN A PALMITATE, CHOLECALCIFEROL, THIAMINE HYDROCHLORIDE, RIBOFLAVIN-5 PHOSPHATE SODIUM, PYRIDOXINE HYDROCHLORIDE, NIACINAMIDE, DEXPANTHENOL, ALPHA-TOCOPHEROL ACETATE, VITAMIN K1, FOLIC ACID, BIOTIN, CYANOCOBALAMIN: 200; 3300; 200; 6; 3.6; 6; 40; 15; 10; 150; 600; 60; 5 INJECTION, SOLUTION INTRAVENOUS at 06:12

## 2023-12-16 RX ADMIN — MAGNESIUM SULFATE HEPTAHYDRATE 2 G: 40 INJECTION, SOLUTION INTRAVENOUS at 06:12

## 2023-12-16 RX ADMIN — LORAZEPAM 2 MG: 2 INJECTION INTRAMUSCULAR; INTRAVENOUS at 06:12

## 2023-12-16 RX ADMIN — ONDANSETRON 4 MG: 2 INJECTION INTRAMUSCULAR; INTRAVENOUS at 03:12

## 2023-12-16 RX ADMIN — IOHEXOL 100 ML: 350 INJECTION, SOLUTION INTRAVENOUS at 05:12

## 2023-12-16 RX ADMIN — FOLIC ACID 1 MG: 5 INJECTION, SOLUTION INTRAMUSCULAR; INTRAVENOUS; SUBCUTANEOUS at 06:12

## 2023-12-16 RX ADMIN — LORAZEPAM 1 MG: 2 INJECTION INTRAMUSCULAR; INTRAVENOUS at 03:12

## 2023-12-16 RX ADMIN — SODIUM CHLORIDE 1000 ML: 9 INJECTION, SOLUTION INTRAVENOUS at 03:12

## 2023-12-16 NOTE — ED PROVIDER NOTES
Encounter Date: 12/16/2023       History     Chief Complaint   Patient presents with    Abdominal Pain     Patient to ER CC of abd pain, vomiting, and shaking for the past few days, patient reports the shaking to his hands and arms when he moves them states its new onset and he can not control it     Shaking    Emesis     Maicol Millan III is a 53 y.o. male with PMH of drug addiction, ETOH abuse, Bipolar DO,   COPD, PTSD, chronic back pain who presents to the ED for evaluation of abdominal pain, nausea, and vomiting.  Patient reports that symptoms have been present for the last 3 days with multiple episodes of bilious, nonbloody emesis.  He also reports associated periumbilical abdominal pain that is constant.  He denies alleviating or exacerbating factors.  2 days ago, his arms and legs began shaking uncontrollably. He is having difficulty walking and fell at home while in the bathtub. He was able to catch his fall with his hands. He denies any head trauma or LOC. Denies any injuries from fall. He has been having L shoulder pain from a previous fall for the past 2 weeks. He drinks ETOH daily (1/2 gallon of Vodka). His last drink was yesterday PM at about 7:30 but he is having difficulty holding fluids down. He denies any neck or back pain. Denies numbness/tingling/saddle anesthesia.   PSH significant for appendectomy and inguinal hernia repair X 2.    The history is provided by the patient.     Review of patient's allergies indicates:   Allergen Reactions    Diphenoxylate-atropine      Past Medical History:   Diagnosis Date    Addiction to drug     Alcohol abuse     last drink 9/06/22    Anxiety     Bipolar disorder     COPD (chronic obstructive pulmonary disease)     Depression     Disc disorder     Fatigue     Headache     History of psychiatric hospitalization     Hx of psychiatric care     Panic disorder     Psychiatric problem     PTSD (post-traumatic stress disorder)     Sleep difficulties     Suicide  attempt     Therapy     Withdrawal symptoms, alcohol      Past Surgical History:   Procedure Laterality Date    APPENDECTOMY      COLONOSCOPY      polyps    HERNIA REPAIR      x 2     Family History   Problem Relation Age of Onset    Anxiety disorder Mother     Depression Mother     Heart block Father     No Known Problems Sister     No Known Problems Brother     Alcohol abuse Maternal Grandfather     Alcohol abuse Paternal Grandfather      Social History     Tobacco Use    Smoking status: Every Day     Current packs/day: 1.00     Average packs/day: 1 pack/day for 37.9 years (37.9 ttl pk-yrs)     Types: Cigarettes     Start date: 1/8/1986    Smokeless tobacco: Never   Substance Use Topics    Alcohol use: Yes     Comment: daily drinker--alcohol abuse    Drug use: Not Currently     Types: Marijuana     Review of Systems   Constitutional:  Negative for appetite change, chills and fever.   HENT:  Negative for congestion, ear discharge, ear pain, postnasal drip, rhinorrhea and sore throat.    Respiratory:  Negative for cough, chest tightness and shortness of breath.    Cardiovascular:  Negative for chest pain.   Gastrointestinal:  Positive for abdominal pain, nausea and vomiting. Negative for abdominal distention.   Genitourinary:  Negative for dysuria, flank pain, hematuria and urgency.   Musculoskeletal:  Positive for arthralgias and back pain.   Skin:  Negative for rash.   Neurological:  Positive for tremors. Negative for dizziness, weakness, numbness and headaches.   Hematological:  Does not bruise/bleed easily.       Physical Exam     Initial Vitals [12/16/23 1511]   BP Pulse Resp Temp SpO2   132/80 71 18 98 °F (36.7 °C) 98 %      MAP       --         Physical Exam    Nursing note and vitals reviewed.  Constitutional: He appears well-developed and well-nourished.   HENT:   Head: Normocephalic and atraumatic.   Eyes: Conjunctivae and EOM are normal. Pupils are equal, round, and reactive to light.   Neck: Neck supple.    Cardiovascular:  Normal rate, regular rhythm, normal heart sounds and intact distal pulses.           Pulmonary/Chest: Breath sounds normal.   Abdominal: Abdomen is soft. Bowel sounds are normal.   Musculoskeletal:      Left shoulder: Tenderness present. Decreased range of motion.      Cervical back: Neck supple.     Neurological: He is alert and oriented to person, place, and time. He has normal strength. No cranial nerve deficit or sensory deficit. GCS eye subscore is 4. GCS verbal subscore is 5. GCS motor subscore is 6.   +Tremors but remains with good and equal strength    Skin: Skin is warm and dry.   Psychiatric: He has a normal mood and affect. His behavior is normal. Judgment and thought content normal. He is actively hallucinating.         ED Course   Procedures  Labs Reviewed   CBC W/ AUTO DIFFERENTIAL - Abnormal; Notable for the following components:       Result Value    RBC 3.83 (*)     Hemoglobin 12.4 (*)     Hematocrit 37.4 (*)     MCH 32.4 (*)     RDW 16.6 (*)     Platelets 93 (*)     Lymph # 0.3 (*)     Gran % 82.4 (*)     Lymph % 6.6 (*)     All other components within normal limits   COMPREHENSIVE METABOLIC PANEL - Abnormal; Notable for the following components:    Glucose 183 (*)     Total Bilirubin 1.2 (*)      (*)     ALT 69 (*)     All other components within normal limits   URINALYSIS, REFLEX TO URINE CULTURE - Abnormal; Notable for the following components:    Protein, UA 2+ (*)     Glucose, UA Trace (*)     Ketones, UA 2+ (*)     Bilirubin (UA) 1+ (*)     All other components within normal limits    Narrative:     Specimen Source->Urine   MAGNESIUM - Abnormal; Notable for the following components:    Magnesium 1.2 (*)     All other components within normal limits   CK - Abnormal; Notable for the following components:     (*)     All other components within normal limits   DRUG SCREEN PANEL, URINE EMERGENCY - Abnormal; Notable for the following components:    Benzodiazepines  Presumptive Positive (*)     All other components within normal limits    Narrative:     Specimen Source->Urine   INFLUENZA A & B BY MOLECULAR   GROUP A STREP, MOLECULAR   LIPASE   TROPONIN I   B-TYPE NATRIURETIC PEPTIDE   AMMONIA   ALCOHOL,MEDICAL (ETHANOL)   SARS-COV-2 RNA AMPLIFICATION, QUAL   URINALYSIS MICROSCOPIC    Narrative:     Specimen Source->Urine   VITAMIN B12   FOLATE   TSH   VITAMIN B12   FOLATE          Imaging Results              X-Ray Shoulder 2 or More Views Left (Final result)  Result time 12/16/23 18:20:03      Final result by Cody Spears MD (12/16/23 18:20:03)                   Impression:      No acute radiographic abnormality.      Electronically signed by: Cody Spears  Date:    12/16/2023  Time:    18:20               Narrative:    EXAMINATION:  XR SHOULDER COMPLETE 2 OR MORE VIEWS LEFT    CLINICAL HISTORY:  left shoulder pain;    TECHNIQUE:  Two or three views of the left shoulder were performed.    COMPARISON:  None    FINDINGS:  Mild degenerative changes of the AC joint.  The humeral head appears normally positioned.  Left hemithorax is clear.  No acute fracture, subluxation or dislocation.                                       X-Ray Chest AP Portable (Final result)  Result time 12/16/23 18:20:43      Final result by Cody Spears MD (12/16/23 18:20:43)                   Impression:      No acute abnormality.      Electronically signed by: Cody Spears  Date:    12/16/2023  Time:    18:20               Narrative:    EXAMINATION:  XR CHEST AP PORTABLE    CLINICAL HISTORY:  Weakness    TECHNIQUE:  Single frontal view of the chest was performed.    COMPARISON:  None    FINDINGS:  The lungs are clear, with normal appearance of pulmonary vasculature and no pleural effusion or pneumothorax.    The cardiac silhouette is normal in size. The hilar and mediastinal contours are unremarkable.    Bones are intact.                                        CT Abdomen Pelvis With IV Contrast  Routine Oral Contrast (Final result)  Result time 12/16/23 17:33:00      Final result by Cody Spears MD (12/16/23 17:33:00)                   Impression:      1. Diverticulosis of the sigmoid colon with associated mild wall thickening.  Mild colitis is a consideration.  Follow-up colonoscopy may better characterize.  2. Mild hepatomegaly with hepatic steatosis.  3. Small hiatal hernia.  4. Small fat containing umbilical hernia.  5. This report was flagged in Epic as abnormal.      Electronically signed by: Cody Spears  Date:    12/16/2023  Time:    17:33               Narrative:    EXAMINATION:  CT ABDOMEN PELVIS WITH IV CONTRAST    CLINICAL HISTORY:  Bowel obstruction suspected;    TECHNIQUE:  Low dose axial images, sagittal and coronal reformations were obtained from the lung bases to the pubic symphysis following the IV administration of 100 mL of Omnipaque 350 and the oral administration of 30 mL of Omnipaque 350.    COMPARISON:  None.    FINDINGS:  Abdomen:    - Lower thorax:Small hiatal hernia.    - Lung bases: No infiltrates and no nodules.    - Liver: Liver is minimally enlarged with diffuse fatty infiltration.  No focal abnormality.    - Gallbladder: No calcified gallstones.    - Bile Ducts: No evidence of intra or extra hepatic biliary ductal dilation.    - Spleen: Negative.    - Kidneys: No mass or hydronephrosis.    - Adrenals: Unremarkable.    - Pancreas: No mass or peripancreatic fat stranding.    - Retroperitoneum:  No significant adenopathy.    - Vascular: No abdominal aortic aneurysm.    - Abdominal wall:  Small fat containing umbilical hernia.    The appendix is not identified.    Pelvis:    Urinary bladder is within normal limits.    Bowel/Mesentery:    No evidence of bowel obstruction.  There is diverticulosis of the sigmoid colon.  There is wall thickening of the sigmoid colon.  Mild colitis is a consideration.  No significant adjacent stranding.  Follow-up colonoscopy may better  characterize.    Bones:  No acute osseous abnormality and no suspicious lytic or blastic lesion.                                       CT Head Without Contrast (Final result)  Result time 12/16/23 17:24:36      Final result by Cody Spears MD (12/16/23 17:24:36)                   Impression:      1. No acute intracranial process.  2. Mild left maxillary sinus disease.      Electronically signed by: Cody Spears  Date:    12/16/2023  Time:    17:24               Narrative:    EXAMINATION:  CT HEAD WITHOUT CONTRAST    CLINICAL HISTORY:  Dizziness, persistent/recurrent, cardiac or vascular cause suspected;tremors;    TECHNIQUE:  Low dose axial CT images obtained throughout the head without intravenous contrast. Sagittal and coronal reconstructions were performed.    COMPARISON:  None.    FINDINGS:  Intracranial compartment:    Ventricles and sulci are normal in size for age without evidence of hydrocephalus. No extra-axial blood or fluid collections.    Mild involutional changes and chronic microvascular ischemic changes in the periventricular white matter.  No parenchymal mass, hemorrhage, edema or major vascular distribution infarct.    Skull/extracranial contents (limited evaluation): No fracture. Probable mucous retention cyst at the floor of the left maxillary sinus.                                       Medications   0.9%  NaCl infusion (1,000 mLs Intravenous New Bag 12/16/23 1538)   LORazepam injection 1 mg (1 mg Intravenous Given 12/16/23 1540)   ondansetron injection 4 mg (4 mg Intravenous Given 12/16/23 1539)   iohexoL (OMNIPAQUE 350) injection 50 mL (30 mLs Oral Given 12/16/23 1712)   iohexoL (OMNIPAQUE 350) injection 100 mL (100 mLs Intravenous Given 12/16/23 1711)   magnesium sulfate 2g in water 50mL IVPB (premix) (2 g Intravenous New Bag 12/16/23 1835)   sodium chloride 0.9% 1,000 mL with mvi, (ADULT) no.4 with vit K 3,300 unit- 150 mcg/10 mL 10 mL infusion ( Intravenous New Bag 12/16/23 1836)      And   folic acid 1 mg in sodium chloride 0.9% 100 mL IVPB (0 mg Intravenous Stopped 12/16/23 2023)     And   thiamine (B-1) 100 mg in dextrose 5 % (D5W) 100 mL IVPB (0 mg Intravenous Stopped 12/16/23 1934)   LORazepam injection 2 mg (2 mg Intravenous Given 12/16/23 1833)     Medical Decision Making  Evaluation of a 53-year-old male with nausea, vomiting, periumbilical abdominal pain and tremors for the past 3 days.  Patient presents with tremors, after speaking with patient he reports history of EtOH abuse.  Reportedly drinks 1/2 a gal of vodka daily.  Last drink was yesterday. + Tremors remains with good motor strength with no sensory deficits. He reports that his legs feel like jelly from shaking when walking causing him to fall and that this has been present for 2 days. He has mild periumbilical ttp on exam without guarding. + dry mucous membranes. He notes falling several weeks ago and injuring his R shoulder with continued pain. He does have + decreased ROM; no obvious deformities with good distal pulses     Diff dx includes viral gastroenteritis, SBO, gastritis, DTs, ETOH withdrawals, shoulder contusion, fx, ligament injury     Amount and/or Complexity of Data Reviewed  Labs: ordered. Decision-making details documented in ED Course.     Details: Lab workup with chronic microcytic anemia  CMP with mildly elevated AST//69  Magnesium 1.2  Radiology: ordered. Decision-making details documented in ED Course.     Details: CT head shows no acute process  CTAP> Diverticulosis of the sigmoid colon with associated mild wall thickening.  Mild colitis is a consideration.  Follow-up colonoscopy may better characterize.  2. Mild hepatomegaly with hepatic steatosis.  3. Small hiatal hernia.  4. Small fat containing umbilical hernia.    CXR shows no acute process  L shoulder Xray with no acute process   ECG/medicine tests: ordered and independent interpretation performed.     Details: SR with short NM interval and  Prolonged QT interval, rate 69. No STEMI  When compared to EKG of 09/20/22 DC interval shortened.     Risk  Prescription drug management.  Decision regarding hospitalization.  Risk Details: Patient meets criteria for admission for likely DTs. IV ativan, magnesium and banana bag given in ED. No tremors noted while sleeping. He is also hallucinating (hearing sounds) but notes that this is ongoing with hx of bipolar DO. He is not suicidal or homicidal. Case extensively discussed with both collaborating provider and hospital medicine, Dr. Gottlieb. Will admit to ICU; telemedicine neuro consult and psychiatry consult in AM.                                       Clinical Impression:  Final diagnoses:  [R11.10] Vomiting  [R53.1] Generalized weakness  [E83.42] Hypomagnesemia  [F10.939] Alcohol withdrawal  [F10.931] Delirium tremens (Primary)          ED Disposition Condition    Admit Stable                Danielle Branch NP  12/16/23 3841

## 2023-12-17 PROBLEM — K52.9 COLITIS: Status: ACTIVE | Noted: 2023-12-17

## 2023-12-17 PROBLEM — F10.932 ALCOHOL WITHDRAWAL SYNDROME WITH PERCEPTUAL DISTURBANCE: Status: ACTIVE | Noted: 2023-12-17

## 2023-12-17 PROBLEM — Z72.0 TOBACCO ABUSE: Status: ACTIVE | Noted: 2023-12-17

## 2023-12-17 PROBLEM — F32.9 MAJOR DEPRESSIVE DISORDER WITH CURRENT ACTIVE EPISODE: Status: ACTIVE | Noted: 2023-12-17

## 2023-12-17 LAB
ALBUMIN SERPL BCP-MCNC: 3.3 G/DL (ref 3.5–5.2)
ALP SERPL-CCNC: 78 U/L (ref 55–135)
ALT SERPL W/O P-5'-P-CCNC: 53 U/L (ref 10–44)
ANION GAP SERPL CALC-SCNC: 12 MMOL/L (ref 8–16)
AST SERPL-CCNC: 74 U/L (ref 10–40)
BASOPHILS # BLD AUTO: 0.03 K/UL (ref 0–0.2)
BASOPHILS NFR BLD: 0.6 % (ref 0–1.9)
BILIRUB SERPL-MCNC: 0.8 MG/DL (ref 0.1–1)
BUN SERPL-MCNC: 8 MG/DL (ref 6–20)
C DIFF GDH STL QL: POSITIVE
C DIFF TOX A+B STL QL IA: NEGATIVE
C DIFF TOX GENS STL QL NAA+PROBE: NEGATIVE
CALCIUM SERPL-MCNC: 7.9 MG/DL (ref 8.7–10.5)
CHLORIDE SERPL-SCNC: 104 MMOL/L (ref 95–110)
CO2 SERPL-SCNC: 24 MMOL/L (ref 23–29)
CREAT SERPL-MCNC: 0.7 MG/DL (ref 0.5–1.4)
DIFFERENTIAL METHOD: ABNORMAL
EOSINOPHIL # BLD AUTO: 0.1 K/UL (ref 0–0.5)
EOSINOPHIL NFR BLD: 1.2 % (ref 0–8)
ERYTHROCYTE [DISTWIDTH] IN BLOOD BY AUTOMATED COUNT: 16.4 % (ref 11.5–14.5)
EST. GFR  (NO RACE VARIABLE): >60 ML/MIN/1.73 M^2
FOLATE SERPL-MCNC: >40 NG/ML (ref 4–24)
GLUCOSE SERPL-MCNC: 119 MG/DL (ref 70–110)
HCT VFR BLD AUTO: 35.2 % (ref 40–54)
HGB BLD-MCNC: 11.8 G/DL (ref 14–18)
IMM GRANULOCYTES # BLD AUTO: 0.01 K/UL (ref 0–0.04)
IMM GRANULOCYTES NFR BLD AUTO: 0.2 % (ref 0–0.5)
LYMPHOCYTES # BLD AUTO: 1 K/UL (ref 1–4.8)
LYMPHOCYTES NFR BLD: 19.6 % (ref 18–48)
MAGNESIUM SERPL-MCNC: 2 MG/DL (ref 1.6–2.6)
MCH RBC QN AUTO: 32.7 PG (ref 27–31)
MCHC RBC AUTO-ENTMCNC: 33.5 G/DL (ref 32–36)
MCV RBC AUTO: 98 FL (ref 82–98)
MONOCYTES # BLD AUTO: 0.4 K/UL (ref 0.3–1)
MONOCYTES NFR BLD: 8.3 % (ref 4–15)
NEUTROPHILS # BLD AUTO: 3.5 K/UL (ref 1.8–7.7)
NEUTROPHILS NFR BLD: 70.1 % (ref 38–73)
NRBC BLD-RTO: 0 /100 WBC
PHOSPHATE SERPL-MCNC: 1.5 MG/DL (ref 2.7–4.5)
PLATELET # BLD AUTO: 82 K/UL (ref 150–450)
PMV BLD AUTO: 11 FL (ref 9.2–12.9)
POTASSIUM SERPL-SCNC: 3.2 MMOL/L (ref 3.5–5.1)
PROT SERPL-MCNC: 5.9 G/DL (ref 6–8.4)
RBC # BLD AUTO: 3.61 M/UL (ref 4.6–6.2)
SODIUM SERPL-SCNC: 140 MMOL/L (ref 136–145)
VIT B12 SERPL-MCNC: 520 PG/ML (ref 210–950)
WBC # BLD AUTO: 4.95 K/UL (ref 3.9–12.7)

## 2023-12-17 PROCEDURE — 96376 TX/PRO/DX INJ SAME DRUG ADON: CPT

## 2023-12-17 PROCEDURE — 87046 STOOL CULTR AEROBIC BACT EA: CPT | Performed by: INTERNAL MEDICINE

## 2023-12-17 PROCEDURE — 99222 PR INITIAL HOSPITAL CARE,LEVL II: ICD-10-PCS | Mod: AF,HB,, | Performed by: PSYCHIATRY & NEUROLOGY

## 2023-12-17 PROCEDURE — 27000221 HC OXYGEN, UP TO 24 HOURS

## 2023-12-17 PROCEDURE — 96368 THER/DIAG CONCURRENT INF: CPT

## 2023-12-17 PROCEDURE — 99222 1ST HOSP IP/OBS MODERATE 55: CPT | Mod: AF,HB,, | Performed by: PSYCHIATRY & NEUROLOGY

## 2023-12-17 PROCEDURE — 96375 TX/PRO/DX INJ NEW DRUG ADDON: CPT

## 2023-12-17 PROCEDURE — 63600175 PHARM REV CODE 636 W HCPCS: Performed by: NURSE PRACTITIONER

## 2023-12-17 PROCEDURE — G0378 HOSPITAL OBSERVATION PER HR: HCPCS

## 2023-12-17 PROCEDURE — 96361 HYDRATE IV INFUSION ADD-ON: CPT

## 2023-12-17 PROCEDURE — 94761 N-INVAS EAR/PLS OXIMETRY MLT: CPT

## 2023-12-17 PROCEDURE — 83735 ASSAY OF MAGNESIUM: CPT | Performed by: INTERNAL MEDICINE

## 2023-12-17 PROCEDURE — 36415 COLL VENOUS BLD VENIPUNCTURE: CPT | Performed by: NURSE PRACTITIONER

## 2023-12-17 PROCEDURE — 94640 AIRWAY INHALATION TREATMENT: CPT

## 2023-12-17 PROCEDURE — 25000003 PHARM REV CODE 250: Performed by: NURSE PRACTITIONER

## 2023-12-17 PROCEDURE — C9113 INJ PANTOPRAZOLE SODIUM, VIA: HCPCS | Performed by: NURSE PRACTITIONER

## 2023-12-17 PROCEDURE — 63600175 PHARM REV CODE 636 W HCPCS: Performed by: INTERNAL MEDICINE

## 2023-12-17 PROCEDURE — 99233 SBSQ HOSP IP/OBS HIGH 50: CPT | Mod: 95,,, | Performed by: STUDENT IN AN ORGANIZED HEALTH CARE EDUCATION/TRAINING PROGRAM

## 2023-12-17 PROCEDURE — 96367 TX/PROPH/DG ADDL SEQ IV INF: CPT

## 2023-12-17 PROCEDURE — 96366 THER/PROPH/DIAG IV INF ADDON: CPT

## 2023-12-17 PROCEDURE — 99223 PR INITIAL HOSPITAL CARE,LEVL III: ICD-10-PCS | Mod: ,,, | Performed by: INTERNAL MEDICINE

## 2023-12-17 PROCEDURE — 25000242 PHARM REV CODE 250 ALT 637 W/ HCPCS: Performed by: INTERNAL MEDICINE

## 2023-12-17 PROCEDURE — 25000003 PHARM REV CODE 250: Performed by: INTERNAL MEDICINE

## 2023-12-17 PROCEDURE — 85025 COMPLETE CBC W/AUTO DIFF WBC: CPT | Performed by: NURSE PRACTITIONER

## 2023-12-17 PROCEDURE — 87493 C DIFF AMPLIFIED PROBE: CPT | Performed by: INTERNAL MEDICINE

## 2023-12-17 PROCEDURE — 99233 PR SUBSEQUENT HOSPITAL CARE,LEVL III: ICD-10-PCS | Mod: 95,,, | Performed by: STUDENT IN AN ORGANIZED HEALTH CARE EDUCATION/TRAINING PROGRAM

## 2023-12-17 PROCEDURE — 80053 COMPREHEN METABOLIC PANEL: CPT | Performed by: NURSE PRACTITIONER

## 2023-12-17 PROCEDURE — 87045 FECES CULTURE AEROBIC BACT: CPT | Performed by: INTERNAL MEDICINE

## 2023-12-17 PROCEDURE — 87427 SHIGA-LIKE TOXIN AG IA: CPT | Mod: 59 | Performed by: INTERNAL MEDICINE

## 2023-12-17 PROCEDURE — 84100 ASSAY OF PHOSPHORUS: CPT | Performed by: INTERNAL MEDICINE

## 2023-12-17 PROCEDURE — 99900035 HC TECH TIME PER 15 MIN (STAT)

## 2023-12-17 PROCEDURE — 87449 NOS EACH ORGANISM AG IA: CPT | Performed by: INTERNAL MEDICINE

## 2023-12-17 PROCEDURE — 87449 NOS EACH ORGANISM AG IA: CPT | Mod: 91 | Performed by: INTERNAL MEDICINE

## 2023-12-17 PROCEDURE — 20000000 HC ICU ROOM

## 2023-12-17 PROCEDURE — 99223 1ST HOSP IP/OBS HIGH 75: CPT | Mod: ,,, | Performed by: INTERNAL MEDICINE

## 2023-12-17 RX ORDER — MUPIROCIN 20 MG/G
OINTMENT TOPICAL 2 TIMES DAILY
Status: DISCONTINUED | OUTPATIENT
Start: 2023-12-17 | End: 2023-12-18 | Stop reason: HOSPADM

## 2023-12-17 RX ORDER — POTASSIUM CHLORIDE 20 MEQ/1
40 TABLET, EXTENDED RELEASE ORAL ONCE
Status: DISCONTINUED | OUTPATIENT
Start: 2023-12-17 | End: 2023-12-17

## 2023-12-17 RX ORDER — IBUPROFEN 400 MG/1
400 TABLET ORAL EVERY 8 HOURS PRN
Status: DISCONTINUED | OUTPATIENT
Start: 2023-12-17 | End: 2023-12-18 | Stop reason: HOSPADM

## 2023-12-17 RX ORDER — IPRATROPIUM BROMIDE AND ALBUTEROL SULFATE 2.5; .5 MG/3ML; MG/3ML
3 SOLUTION RESPIRATORY (INHALATION) 3 TIMES DAILY PRN
Status: DISCONTINUED | OUTPATIENT
Start: 2023-12-17 | End: 2023-12-18 | Stop reason: HOSPADM

## 2023-12-17 RX ORDER — ROPINIROLE 0.25 MG/1
1 TABLET, FILM COATED ORAL NIGHTLY
Status: DISCONTINUED | OUTPATIENT
Start: 2023-12-17 | End: 2023-12-18 | Stop reason: HOSPADM

## 2023-12-17 RX ORDER — ZOLPIDEM TARTRATE 5 MG/1
5 TABLET ORAL NIGHTLY PRN
Status: COMPLETED | OUTPATIENT
Start: 2023-12-17 | End: 2023-12-17

## 2023-12-17 RX ORDER — LOPERAMIDE HYDROCHLORIDE 2 MG/1
2 CAPSULE ORAL 4 TIMES DAILY PRN
Status: DISCONTINUED | OUTPATIENT
Start: 2023-12-16 | End: 2023-12-18 | Stop reason: HOSPADM

## 2023-12-17 RX ORDER — CIPROFLOXACIN 250 MG/1
750 TABLET, FILM COATED ORAL EVERY 12 HOURS
Status: DISCONTINUED | OUTPATIENT
Start: 2023-12-17 | End: 2023-12-18 | Stop reason: HOSPADM

## 2023-12-17 RX ADMIN — POTASSIUM PHOSPHATE, MONOBASIC POTASSIUM PHOSPHATE, DIBASIC 30 MMOL: 224; 236 INJECTION, SOLUTION, CONCENTRATE INTRAVENOUS at 10:12

## 2023-12-17 RX ADMIN — LOPERAMIDE HYDROCHLORIDE 2 MG: 2 CAPSULE ORAL at 07:12

## 2023-12-17 RX ADMIN — MUPIROCIN: 20 OINTMENT TOPICAL at 10:12

## 2023-12-17 RX ADMIN — MAGNESIUM SULFATE HEPTAHYDRATE 2 G: 40 INJECTION, SOLUTION INTRAVENOUS at 12:12

## 2023-12-17 RX ADMIN — CIPROFLOXACIN 750 MG: 250 TABLET, COATED ORAL at 10:12

## 2023-12-17 RX ADMIN — LOPERAMIDE HYDROCHLORIDE 2 MG: 2 CAPSULE ORAL at 09:12

## 2023-12-17 RX ADMIN — LORAZEPAM 2 MG: 2 INJECTION INTRAMUSCULAR; INTRAVENOUS at 10:12

## 2023-12-17 RX ADMIN — LORAZEPAM 2 MG: 2 INJECTION INTRAMUSCULAR; INTRAVENOUS at 03:12

## 2023-12-17 RX ADMIN — ZOLPIDEM TARTRATE 5 MG: 5 TABLET ORAL at 09:12

## 2023-12-17 RX ADMIN — CIPROFLOXACIN 750 MG: 250 TABLET, COATED ORAL at 09:12

## 2023-12-17 RX ADMIN — MUPIROCIN: 20 OINTMENT TOPICAL at 09:12

## 2023-12-17 RX ADMIN — DEXTROSE AND SODIUM CHLORIDE: 5; 900 INJECTION, SOLUTION INTRAVENOUS at 07:12

## 2023-12-17 RX ADMIN — LORAZEPAM 2 MG: 2 INJECTION INTRAMUSCULAR; INTRAVENOUS at 07:12

## 2023-12-17 RX ADMIN — ROPINIROLE HYDROCHLORIDE 1 MG: 0.25 TABLET, FILM COATED ORAL at 09:12

## 2023-12-17 RX ADMIN — LOPERAMIDE HYDROCHLORIDE 2 MG: 2 CAPSULE ORAL at 01:12

## 2023-12-17 RX ADMIN — LOPERAMIDE HYDROCHLORIDE 2 MG: 2 CAPSULE ORAL at 11:12

## 2023-12-17 RX ADMIN — DEXTROSE AND SODIUM CHLORIDE: 5; 900 INJECTION, SOLUTION INTRAVENOUS at 04:12

## 2023-12-17 RX ADMIN — FOLIC ACID 1 MG: 5 INJECTION, SOLUTION INTRAMUSCULAR; INTRAVENOUS; SUBCUTANEOUS at 12:12

## 2023-12-17 RX ADMIN — PANTOPRAZOLE SODIUM 40 MG: 40 INJECTION, POWDER, FOR SOLUTION INTRAVENOUS at 10:12

## 2023-12-17 RX ADMIN — DEXTROSE AND SODIUM CHLORIDE: 5; 900 INJECTION, SOLUTION INTRAVENOUS at 12:12

## 2023-12-17 RX ADMIN — THIAMINE HYDROCHLORIDE 100 MG: 100 INJECTION, SOLUTION INTRAMUSCULAR; INTRAVENOUS at 12:12

## 2023-12-17 RX ADMIN — IPRATROPIUM BROMIDE AND ALBUTEROL SULFATE 3 ML: 2.5; .5 SOLUTION RESPIRATORY (INHALATION) at 12:12

## 2023-12-17 RX ADMIN — ASCORBIC ACID, VITAMIN A PALMITATE, CHOLECALCIFEROL, THIAMINE HYDROCHLORIDE, RIBOFLAVIN-5 PHOSPHATE SODIUM, PYRIDOXINE HYDROCHLORIDE, NIACINAMIDE, DEXPANTHENOL, ALPHA-TOCOPHEROL ACETATE, VITAMIN K1, FOLIC ACID, BIOTIN, CYANOCOBALAMIN: 200; 3300; 200; 6; 3.6; 6; 40; 15; 10; 150; 600; 60; 5 INJECTION, SOLUTION INTRAVENOUS at 12:12

## 2023-12-17 NOTE — CONSULTS
PSYCHIATRY CONSULT EVALUATION    Patient Name: Maicol Millan III  MRN: [id number]  Patient Class: IP- Inpatient  Admission Date: 12/16/2023  Attending Physician: Kaylynn Gottlieb MD      HPI:   Maicol Millan III is a 53 y.o. male with past psychiatric history of depression, anxiety, EtOH dependence & past pertinent medical history of COPD presents to the ED/admitted to the hospital for <principal problem not specified>    Psychiatry consulted for Hallucinations,    On psych exam, pt is alert, oriented, and cooperative. He reports he has been struggling with alcohol abuse since age 5. States he drinks increasingly from age [age] then became sober until his early twenties. Since that time he has been drinking daily until December 14, which he states was his last drink prior to coming to the hospital. Pt states he detoxed himself off of alcohol prior to this date. He is currently visibly tremulous, which worsens when hands are outstretched. He is concerned about his preset difficulties with ambulation, as he has noticed that whether he is drinking or not his gait has become increasingly unsteady over the past several months. This acutely worsened in the days leading to presentation.     Pt has been prescribed BZD medication as recently as 09/2023, but he denies current use of BZD.     Pt reports he has a history of anxiety and depression, both of which he takes medication for. He is unable to recall all of his medications off hand, but knows he is on remeron 45mg PO QHS. Per chart review he is also on Wellbutrin XL 300mg daily and Buspar 15mg daily. He reports ongoing depression, but denies SI or HI.     Pt states [facility] DC from the medical floor in hopes of going to a 28-day program from there. He is willing to sign himself.     Pt reports that he has had several episodes at home during which he hears the TV or music when they are not on. Has not experienced this in the  "[facility] and denies AV at time of interview.         Medical Review of Systems:  Pertinent items are noted in HPI.    Psychiatric Review of Systems (is patient experiencing or having changes in):  sleep: yes  appetite: yes  weight: no  energy/anergy: yes  interest/pleasure/anhedonia: no  somatic symptoms: yes  anxiety/panic: no  guilty/hopelessness: no  concentration: yes  Pita:no  Psychosis: no  Trauma: no  S.I.B.s/risky behavior: no      Mental Status Exam:  General Appearance: appears stated age, well-developed, well-nourished  Behavior: normal; cooperative; reasonably friendly, pleasant, and polite; appropriate eye-contact; under good behavioral control  Involuntary Movements and Motor Activity: +tremors  Gait and Station: abnormal gait  Speech and Language: intact; normal rate, rhythm, volume, tone, and pitch; conversational, spontaneous, and coherent; speaks and understands English proficiently and fluently; repeats words and phrases, no word finding difficulties are noted  Mood: "ok"  Affect: euthymic  Thought Process and Associations: intact; linear, goal-directed, organized, and logical; no loosening of associations noted  Thought Content and Perceptions:: no suicidal or homicidal ideation, no auditory or visual hallucinations, no paranoid ideation, no ideas of reference, no evidence of delusions or psychosis  Sensorium and Orientation: intact; alert with clear sensorium; oriented fully to person, place, time and situation  Recent and Remote Memory: grossly intact, able to recall relevant and salient information from the recent and remote past, He is not able to recall last two presidents without clues  Attention and Concentration: grossly intact, attentive to the conversation and not readily distractible  Fund of Knowledge: grossly intact, used appropriate vocabulary and demonstrated an awareness of current events, consistent with educational level achieved  Insight: intact, demonstrates awareness of " illness and situation  Judgment: intact, behavior is adequate/appropriate to the circumstances, compliant with health provider's recommendations and instructions    CAM ICU positive? no      ASSESSMENT & RECOMMENDATIONS   (Please list each relevant SPECIFIC psychiatric DSMV or medical diagnosis and recs for it under the listing DO NOT WRITE AN IMPRESSION PARAGRAPH!!)    MDD mild/moderate/severe, BIPOLAR I/II, Unspecified mood etc  PSYCH MEDICATIONS  Wellbutrin XL 300mg PO Daily (home med)  Remeron 45mg PO QHS (home med)      CAROLA/panic d/o, adjustment d/o etc  PSYCH MEDICATIONS  Buspar 15mg PO TIID (home med)      EtOH Dependence  Continue Thiamine 100mg IV daily   Pt does not appear to have WE but is likely thiamine deficient.  Monitor for worsening mentation in the setting of likely thiamine deficiency related to chronic alcohol abuse. If mentation worsens, need will arise to further increase thiamine treatment.  Start Ativan 1mg PO TID with plan to taper  Although pt reports taper at home and VS have remained stable, he is tremulous, unsteady and reports intermittent hallucinations which would likely benefit from additional taper    RISK ASSESSMENT  NO NEED FOR PEC patient NOT in any imminent danger of hurting self or others and not gravely disabled.     FOLLOW UP  Will follow up while in house    DISPOSITION - once medically cleared:    Defer to medical team  Will continue to discuss with pt the need for inpatient psych vs transfer to rehab from medical floor.     Gold Beebe MD          --------------------------------------------------------------------------------------------------------------------------------------------------------------------------------------------------------------------------------------    CONTINUED HISTORY & OBJECTIVE clinical data & findings reviewed and noted for above decision making    Past Medical/Surgical History:   Past Medical History:   Diagnosis Date    Addiction to drug      Alcohol abuse     last drink 9/06/22    Anxiety     Bipolar disorder     COPD (chronic obstructive pulmonary disease)     Depression     Disc disorder     Fatigue     Headache     History of psychiatric hospitalization     Hx of psychiatric care     Panic disorder     Psychiatric problem     PTSD (post-traumatic stress disorder)     Sleep difficulties     Suicide attempt     Therapy     Withdrawal symptoms, alcohol      Past Surgical History:   Procedure Laterality Date    APPENDECTOMY      COLONOSCOPY      polyps    HERNIA REPAIR      x 2       Current Medications:   Scheduled Meds:    ciprofloxacin HCl  750 mg Oral Q12H    mupirocin   Nasal BID    pantoprazole  40 mg Intravenous Daily    potassium phosphate IVPB  30 mmol Intravenous Once     PRN Meds: loperamide, lorazepam, melatonin, ondansetron, sodium chloride 0.9%    Allergies:   Review of patient's allergies indicates:   Allergen Reactions    Diphenoxylate-atropine Shortness Of Breath and Other (See Comments)     Esophagus tightens       Vitals  Vitals:    12/17/23 0830   BP: 135/74   Pulse: 80   Resp:    Temp:        Labs/Imaging/Studies:  Recent Results (from the past 24 hour(s))   Comprehensive Metabolic Panel    Collection Time: 12/16/23  3:21 PM   Result Value Ref Range    Sodium 141 136 - 145 mmol/L    Potassium 4.0 3.5 - 5.1 mmol/L    Chloride 100 95 - 110 mmol/L    CO2 26 23 - 29 mmol/L    Glucose 183 (H) 70 - 110 mg/dL    BUN 15 6 - 20 mg/dL    Creatinine 0.8 0.5 - 1.4 mg/dL    Calcium 8.9 8.7 - 10.5 mg/dL    Total Protein 6.9 6.0 - 8.4 g/dL    Albumin 3.6 3.5 - 5.2 g/dL    Total Bilirubin 1.2 (H) 0.1 - 1.0 mg/dL    Alkaline Phosphatase 90 55 - 135 U/L     (H) 10 - 40 U/L    ALT 69 (H) 10 - 44 U/L    eGFR >60 >60 mL/min/1.73 m^2    Anion Gap 15 8 - 16 mmol/L   Lipase    Collection Time: 12/16/23  3:21 PM   Result Value Ref Range    Lipase 11 4 - 60 U/L   Troponin I    Collection Time: 12/16/23  3:21 PM   Result Value Ref Range    Troponin  I 0.008 0.000 - 0.026 ng/mL   Magnesium    Collection Time: 12/16/23  3:21 PM   Result Value Ref Range    Magnesium 1.2 (L) 1.6 - 2.6 mg/dL   Ethanol    Collection Time: 12/16/23  3:21 PM   Result Value Ref Range    Alcohol, Serum <10 <10 mg/dL   CPK    Collection Time: 12/16/23  3:21 PM   Result Value Ref Range     (H) 20 - 200 U/L   CBC Auto Differential    Collection Time: 12/16/23  3:22 PM   Result Value Ref Range    WBC 4.38 3.90 - 12.70 K/uL    RBC 3.83 (L) 4.60 - 6.20 M/uL    Hemoglobin 12.4 (L) 14.0 - 18.0 g/dL    Hematocrit 37.4 (L) 40.0 - 54.0 %    MCV 98 82 - 98 fL    MCH 32.4 (H) 27.0 - 31.0 pg    MCHC 33.2 32.0 - 36.0 g/dL    RDW 16.6 (H) 11.5 - 14.5 %    Platelets 93 (L) 150 - 450 K/uL    MPV 11.5 9.2 - 12.9 fL    Immature Granulocytes 0.5 0.0 - 0.5 %    Gran # (ANC) 3.6 1.8 - 7.7 K/uL    Immature Grans (Abs) 0.02 0.00 - 0.04 K/uL    Lymph # 0.3 (L) 1.0 - 4.8 K/uL    Mono # 0.4 0.3 - 1.0 K/uL    Eos # 0.1 0.0 - 0.5 K/uL    Baso # 0.04 0.00 - 0.20 K/uL    nRBC 0 0 /100 WBC    Gran % 82.4 (H) 38.0 - 73.0 %    Lymph % 6.6 (L) 18.0 - 48.0 %    Mono % 8.0 4.0 - 15.0 %    Eosinophil % 1.6 0.0 - 8.0 %    Basophil % 0.9 0.0 - 1.9 %    Differential Method Automated    BNP    Collection Time: 12/16/23  3:22 PM   Result Value Ref Range    BNP 17 0 - 99 pg/mL   Ammonia    Collection Time: 12/16/23  3:22 PM   Result Value Ref Range    Ammonia 47 10 - 50 umol/L   Urinalysis, Reflex to Urine Culture Urine, Clean Catch    Collection Time: 12/16/23  5:41 PM    Specimen: Urine   Result Value Ref Range    Specimen UA Urine, Clean Catch     Color, UA Yellow Yellow, Straw, Nathalia    Appearance, UA Clear Clear    pH, UA >=9.0 5.0 - 8.0    Specific Gravity, UA 1.015 1.005 - 1.030    Protein, UA 2+ (A) Negative    Glucose, UA Trace (A) Negative    Ketones, UA 2+ (A) Negative    Bilirubin (UA) 1+ (A) Negative    Occult Blood UA Negative Negative    Nitrite, UA Negative Negative    Urobilinogen, UA 1.0 <2.0 EU/dL     Leukocytes, UA Negative Negative   Drug screen panel, in-house    Collection Time: 12/16/23  5:41 PM   Result Value Ref Range    Benzodiazepines Presumptive Positive (A) Negative    Methadone metabolites Negative Negative    Cocaine (Metab.) Negative Negative    Opiate Scrn, Ur Negative Negative    Barbiturate Screen, Ur Negative Negative    Amphetamine Screen, Ur Negative Negative    THC Negative Negative    Phencyclidine Negative Negative    Creatinine, Urine 145.5 23.0 - 375.0 mg/dL    Toxicology Information SEE COMMENT    Urinalysis Microscopic    Collection Time: 12/16/23  5:41 PM   Result Value Ref Range    RBC, UA 1 0 - 4 /hpf    WBC, UA 1 0 - 5 /hpf    Bacteria Rare None-Occ /hpf    Hyaline Casts, UA 0 0-1/lpf /lpf    Microscopic Comment SEE COMMENT    Influenza A & B by Molecular    Collection Time: 12/16/23  6:55 PM    Specimen: Nasopharyngeal Swab   Result Value Ref Range    Influenza A, Molecular Negative Negative    Influenza B, Molecular Negative Negative    Flu A & B Source Nasal swab    Group A Strep, Molecular    Collection Time: 12/16/23  6:55 PM    Specimen: Throat   Result Value Ref Range    Group A Strep, Molecular Negative Negative   COVID-19 Rapid Screening    Collection Time: 12/16/23  6:55 PM   Result Value Ref Range    SARS-CoV-2 RNA, Amplification, Qual Negative Negative   Vitamin B12    Collection Time: 12/16/23  9:02 PM   Result Value Ref Range    Vitamin B-12 520 210 - 950 pg/mL   Folate    Collection Time: 12/16/23  9:02 PM   Result Value Ref Range    Folate >40.0 (H) 4.0 - 24.0 ng/mL   CBC auto differential    Collection Time: 12/17/23  4:57 AM   Result Value Ref Range    WBC 4.95 3.90 - 12.70 K/uL    RBC 3.61 (L) 4.60 - 6.20 M/uL    Hemoglobin 11.8 (L) 14.0 - 18.0 g/dL    Hematocrit 35.2 (L) 40.0 - 54.0 %    MCV 98 82 - 98 fL    MCH 32.7 (H) 27.0 - 31.0 pg    MCHC 33.5 32.0 - 36.0 g/dL    RDW 16.4 (H) 11.5 - 14.5 %    Platelets 82 (L) 150 - 450 K/uL    MPV 11.0 9.2 - 12.9 fL     Immature Granulocytes 0.2 0.0 - 0.5 %    Gran # (ANC) 3.5 1.8 - 7.7 K/uL    Immature Grans (Abs) 0.01 0.00 - 0.04 K/uL    Lymph # 1.0 1.0 - 4.8 K/uL    Mono # 0.4 0.3 - 1.0 K/uL    Eos # 0.1 0.0 - 0.5 K/uL    Baso # 0.03 0.00 - 0.20 K/uL    nRBC 0 0 /100 WBC    Gran % 70.1 38.0 - 73.0 %    Lymph % 19.6 18.0 - 48.0 %    Mono % 8.3 4.0 - 15.0 %    Eosinophil % 1.2 0.0 - 8.0 %    Basophil % 0.6 0.0 - 1.9 %    Differential Method Automated    Comprehensive metabolic panel    Collection Time: 12/17/23  4:57 AM   Result Value Ref Range    Sodium 140 136 - 145 mmol/L    Potassium 3.2 (L) 3.5 - 5.1 mmol/L    Chloride 104 95 - 110 mmol/L    CO2 24 23 - 29 mmol/L    Glucose 119 (H) 70 - 110 mg/dL    BUN 8 6 - 20 mg/dL    Creatinine 0.7 0.5 - 1.4 mg/dL    Calcium 7.9 (L) 8.7 - 10.5 mg/dL    Total Protein 5.9 (L) 6.0 - 8.4 g/dL    Albumin 3.3 (L) 3.5 - 5.2 g/dL    Total Bilirubin 0.8 0.1 - 1.0 mg/dL    Alkaline Phosphatase 78 55 - 135 U/L    AST 74 (H) 10 - 40 U/L    ALT 53 (H) 10 - 44 U/L    eGFR >60 >60 mL/min/1.73 m^2    Anion Gap 12 8 - 16 mmol/L   Magnesium    Collection Time: 12/17/23  4:57 AM   Result Value Ref Range    Magnesium 2.0 1.6 - 2.6 mg/dL   Phosphorus    Collection Time: 12/17/23  4:57 AM   Result Value Ref Range    Phosphorus 1.5 (L) 2.7 - 4.5 mg/dL     Imaging Results              X-Ray Shoulder 2 or More Views Left (Final result)  Result time 12/16/23 18:20:03      Final result by Cody Spears MD (12/16/23 18:20:03)                   Impression:      No acute radiographic abnormality.      Electronically signed by: Cody Spears  Date:    12/16/2023  Time:    18:20               Narrative:    EXAMINATION:  XR SHOULDER COMPLETE 2 OR MORE VIEWS LEFT    CLINICAL HISTORY:  left shoulder pain;    TECHNIQUE:  Two or three views of the left shoulder were performed.    COMPARISON:  None    FINDINGS:  Mild degenerative changes of the AC joint.  The humeral head appears normally positioned.  Left hemithorax is  clear.  No acute fracture, subluxation or dislocation.                                       X-Ray Chest AP Portable (Final result)  Result time 12/16/23 18:20:43      Final result by Cody Spears MD (12/16/23 18:20:43)                   Impression:      No acute abnormality.      Electronically signed by: Cody Spears  Date:    12/16/2023  Time:    18:20               Narrative:    EXAMINATION:  XR CHEST AP PORTABLE    CLINICAL HISTORY:  Weakness    TECHNIQUE:  Single frontal view of the chest was performed.    COMPARISON:  None    FINDINGS:  The lungs are clear, with normal appearance of pulmonary vasculature and no pleural effusion or pneumothorax.    The cardiac silhouette is normal in size. The hilar and mediastinal contours are unremarkable.    Bones are intact.                                        CT Abdomen Pelvis With IV Contrast Routine Oral Contrast (Final result)  Result time 12/16/23 17:33:00      Final result by Cody Spaers MD (12/16/23 17:33:00)                   Impression:      1. Diverticulosis of the sigmoid colon with associated mild wall thickening.  Mild colitis is a consideration.  Follow-up colonoscopy may better characterize.  2. Mild hepatomegaly with hepatic steatosis.  3. Small hiatal hernia.  4. Small fat containing umbilical hernia.  5. This report was flagged in Epic as abnormal.      Electronically signed by: Cody Spears  Date:    12/16/2023  Time:    17:33               Narrative:    EXAMINATION:  CT ABDOMEN PELVIS WITH IV CONTRAST    CLINICAL HISTORY:  Bowel obstruction suspected;    TECHNIQUE:  Low dose axial images, sagittal and coronal reformations were obtained from the lung bases to the pubic symphysis following the IV administration of 100 mL of Omnipaque 350 and the oral administration of 30 mL of Omnipaque 350.    COMPARISON:  None.    FINDINGS:  Abdomen:    - Lower thorax:Small hiatal hernia.    - Lung bases: No infiltrates and no nodules.    - Liver: Liver  is minimally enlarged with diffuse fatty infiltration.  No focal abnormality.    - Gallbladder: No calcified gallstones.    - Bile Ducts: No evidence of intra or extra hepatic biliary ductal dilation.    - Spleen: Negative.    - Kidneys: No mass or hydronephrosis.    - Adrenals: Unremarkable.    - Pancreas: No mass or peripancreatic fat stranding.    - Retroperitoneum:  No significant adenopathy.    - Vascular: No abdominal aortic aneurysm.    - Abdominal wall:  Small fat containing umbilical hernia.    The appendix is not identified.    Pelvis:    Urinary bladder is within normal limits.    Bowel/Mesentery:    No evidence of bowel obstruction.  There is diverticulosis of the sigmoid colon.  There is wall thickening of the sigmoid colon.  Mild colitis is a consideration.  No significant adjacent stranding.  Follow-up colonoscopy may better characterize.    Bones:  No acute osseous abnormality and no suspicious lytic or blastic lesion.                                       CT Head Without Contrast (Final result)  Result time 12/16/23 17:24:36      Final result by Cody Spears MD (12/16/23 17:24:36)                   Impression:      1. No acute intracranial process.  2. Mild left maxillary sinus disease.      Electronically signed by: Cody Spears  Date:    12/16/2023  Time:    17:24               Narrative:    EXAMINATION:  CT HEAD WITHOUT CONTRAST    CLINICAL HISTORY:  Dizziness, persistent/recurrent, cardiac or vascular cause suspected;tremors;    TECHNIQUE:  Low dose axial CT images obtained throughout the head without intravenous contrast. Sagittal and coronal reconstructions were performed.    COMPARISON:  None.    FINDINGS:  Intracranial compartment:    Ventricles and sulci are normal in size for age without evidence of hydrocephalus. No extra-axial blood or fluid collections.    Mild involutional changes and chronic microvascular ischemic changes in the periventricular white matter.  No parenchymal  mass, hemorrhage, edema or major vascular distribution infarct.    Skull/extracranial contents (limited evaluation): No fracture. Probable mucous retention cyst at the floor of the left maxillary sinus.

## 2023-12-17 NOTE — PROGRESS NOTES
Pharmacist Renal Dose Adjustment Note    Maicol Millan III is a 53 y.o. male being treated with the medication CIPRO    Patient Data:    Vital Signs (Most Recent):  Temp: 98.4 °F (36.9 °C) (12/17/23 0730)  Pulse: 86 (12/17/23 0800)  Resp: (!) 21 (12/17/23 0723)  BP: (!) 141/79 (12/17/23 0800)  SpO2: (!) 94 % (12/17/23 0800) Vital Signs (72h Range):  Temp:  [96.2 °F (35.7 °C)-98.5 °F (36.9 °C)]   Pulse:  [69-93]   Resp:  [12-22]   BP: (116-165)/()   SpO2:  [90 %-98 %]      Recent Labs   Lab 12/16/23  1521 12/17/23  0457   CREATININE 0.8 0.7     Serum creatinine: 0.7 mg/dL 12/17/23 0457  Estimated creatinine clearance: 130.9 mL/min    Medication: CIPRO  dose: 500MG  frequency Q12H will be changed to medication: CIPRO  dose: 750MG frequency:  Q12H    Pharmacist's Name: Ching BaileyD   Pharmacist's Extension: [phone]

## 2023-12-17 NOTE — ASSESSMENT & PLAN NOTE
He stopped drinking alcohol 4 days ago .   He drinks ETOH daily (1/2 gallon of Vodka)  Confusing history   Still very tremulous  Continue banana bag  Continue ativan   Continue IVF

## 2023-12-17 NOTE — ED NOTES
Pt transferred to floor via stretcher, NADN, vitals stable. Pt accompanied by RN and tech. Report given to RICO Yeh.

## 2023-12-17 NOTE — NURSING
Patient received from ED on stretcher with IVF per pump. Patient AAOX3 in no acute distress. Spouse with patient.

## 2023-12-17 NOTE — PLAN OF CARE
Patient awake, alert, oriented. 2 L nasal cannula- O2 sats 94-98%. Patient's O2 sats drop to 89-91% room air. Normal sinus rhythm on tele. VSS. Afebrile. No distress noted. CIWA 2. Patient up to toilet with stand by assistance. Psych and tele neuro consulted. Spouse at bedside. IV fluids continued. PRN breathing treatments ordered for wheezing. Imodium and Ativan PRN continued. Special contact precautions maintained. Call bell in reach. Encouraged to call for assistance. Free from fall or injury. Plan of care reviewed with patient and spouse.

## 2023-12-17 NOTE — H&P
[facility] Medicine  History & Physical    Patient Name: Maicol Millan III  MRN: [id number]  Patient Class: IP- Inpatient  Admission Date: 12/16/2023  Attending Physician: Kaylynn Gottlieb MD   Primary Care Provider: Arline Primary Doctor         Patient information was obtained from patient, past medical records, and ER records.     Subjective:     Principal Problem:Alcohol withdrawal; colitis;     Chief Complaint:   Chief Complaint   Patient presents with    Abdominal Pain     Patient to ER CC of abd pain, vomiting, and shaking for the past few days, patient reports the shaking to his hands and arms when he moves them states its new onset and he can not control it     Shaking    Emesis        HPI: Maicol Millan III is a 53 y.o. male with PMH of drug addiction, ETOH abuse, Bipolar DO,   COPD, PTSD, chronic back pain who presents to the ED for evaluation of abdominal pain, nausea, and vomiting.  Patient reports that symptoms have been present for the last 3 days with multiple episodes of bilious, nonbloody emesis.  He also reports associated periumbilical abdominal pain that is constant.  He denies alleviating or exacerbating factors.  2 days ago, his arms and legs began shaking uncontrollably. He is having difficulty walking and fell at home while in the bathtub. He was able to catch his fall with his hands. He denies any head trauma or LOC. Denies any injuries from fall. He has been having L shoulder pain from a previous fall for the past 2 weeks. He drinks ETOH daily (1/2 gallon of Vodka). His last drink was yesterday PM at about 7:30 but he is having difficulty holding fluids down. He denies any neck or back pain. Denies numbness/tingling/saddle anesthesia.   PSH significant for appendectomy and inguinal hernia repair X 2.    He has been admitted for alcohol with drawl , colitis .  He is having diarrhea for few days ; took antibiotics for respiratory reasons.smoker ;refuses  nicotine patch   C/o severe insomnia . He still has tremors         Past Medical History:   Diagnosis Date    Addiction to drug     Alcohol abuse     last drink 9/06/22    Anxiety     Bipolar disorder     COPD (chronic obstructive pulmonary disease)     Depression     Disc disorder     Fatigue     Headache     History of psychiatric hospitalization     Hx of psychiatric care     Panic disorder     Psychiatric problem     PTSD (post-traumatic stress disorder)     Sleep difficulties     Suicide attempt     Therapy     Withdrawal symptoms, alcohol        Past Surgical History:   Procedure Laterality Date    APPENDECTOMY      COLONOSCOPY      polyps    HERNIA REPAIR      x 2       Review of patient's allergies indicates:   Allergen Reactions    Diphenoxylate-atropine Shortness Of Breath and Other (See Comments)     Esophagus tightens       No current facility-administered medications on file prior to encounter.     Current Outpatient Medications on File Prior to Encounter   Medication Sig    albuterol (PROVENTIL/VENTOLIN HFA) 90 mcg/actuation inhaler Inhale 2 puffs into the lungs every 4 (four) hours as needed for Wheezing or Shortness of Breath.    buPROPion (WELLBUTRIN XL) 300 MG 24 hr tablet Take 1 tablet (300 mg total) by mouth once daily.    busPIRone (BUSPAR) 15 MG tablet Take 15 mg by mouth 3 (three) times daily.    diclofenac (VOLTAREN) 75 MG EC tablet Take 75 mg by mouth 2 (two) times daily.    famotidine (PEPCID) 20 MG tablet Take 20 mg by mouth 2 (two) times daily.    fluticasone propionate (FLOVENT HFA) 110 mcg/actuation inhaler Inhale 2 puffs into the lungs 2 (two) times daily. Controller    hydrOXYzine HCL (ATARAX) 10 MG Tab Take 25 mg by mouth every evening.    mirtazapine (REMERON) 45 MG tablet Take 45 mg by mouth every evening.    naltrexone (DEPADE) 50 mg tablet Take 50 mg by mouth once daily.    propranoloL (INDERAL) 10 MG tablet Take 10 mg by mouth 2 (two) times daily.    rOPINIRole (REQUIP) 2 MG  tablet TAKE ONE TABLET MY MOUTH DAILY 1 3 HOURS BEFORE BEDTIME    tiotropium-olodateroL (STIOLTO RESPIMAT) 2.5-2.5 mcg/actuation Mist Stiolto Respimat 2.5 mcg-2.5 mcg/actuation solution for inhalation   INHALE 2 PUFF(S) EVERY DAY BY INHALATION ROUTE AS DIRECTED.     Family History       Problem Relation (Age of Onset)    Alcohol abuse Maternal Grandfather, Paternal Grandfather    Anxiety disorder Mother    Depression Mother    Heart block Father    No Known Problems Sister, Brother          Tobacco Use    Smoking status: Every Day     Current packs/day: 1.00     Average packs/day: 1 pack/day for 37.9 years (37.9 ttl pk-yrs)     Types: Cigarettes     Start date: 1/8/1986    Smokeless tobacco: Never   Substance and Sexual Activity    Alcohol use: Yes     Comment: daily drinker--alcohol abuse    Drug use: Not Currently     Types: Marijuana    Sexual activity: Yes     Birth control/protection: None     Review of Systems   Constitutional:  Positive for fatigue. Negative for chills and fever.   HENT:  Negative for congestion, postnasal drip and sore throat.    Eyes:  Negative for photophobia.   Respiratory:  Negative for chest tightness and shortness of breath.    Cardiovascular:  Negative for chest pain.   Gastrointestinal:  Positive for abdominal pain, diarrhea and vomiting. Negative for abdominal distention and blood in stool.   Genitourinary:  Negative for dysuria, flank pain and hematuria.   Musculoskeletal:  Negative for back pain.   Skin:  Negative for pallor.   Neurological:  Positive for dizziness and weakness. Negative for seizures, facial asymmetry, speech difficulty and numbness.   Hematological:  Does not bruise/bleed easily.   Psychiatric/Behavioral:  Positive for hallucinations. Negative for agitation and suicidal ideas. The patient is not nervous/anxious.         Hearing music ;unable to sleep      Objective:     Vital Signs (Most Recent):  Temp: 98.4 °F (36.9 °C) (12/17/23 0730)  Pulse: 86 (12/17/23  0800)  Resp: (!) 21 (12/17/23 0723)  BP: (!) 141/79 (12/17/23 0800)  SpO2: (!) 94 % (12/17/23 0800) Vital Signs (24h Range):  Temp:  [96.2 °F (35.7 °C)-98.5 °F (36.9 °C)] 98.4 °F (36.9 °C)  Pulse:  [69-93] 86  Resp:  [12-22] 21  SpO2:  [90 %-98 %] 94 %  BP: (116-165)/() 141/79     Weight: 85.4 kg (188 lb 4.4 oz)  Body mass index is 25.53 kg/m².     Physical Exam  Vitals and nursing note reviewed.   Constitutional:       Appearance: He is well-developed.   HENT:      Head: Normocephalic and atraumatic.      Nose: Nose normal.   Eyes:      Conjunctiva/sclera: Conjunctivae normal.      Pupils: Pupils are equal, round, and reactive to light.   Neck:      Thyroid: No thyromegaly.      Vascular: No JVD.   Cardiovascular:      Rate and Rhythm: Normal rate and regular rhythm.      Heart sounds: Normal heart sounds.   Pulmonary:      Effort: Pulmonary effort is normal.      Breath sounds: Normal breath sounds.   Abdominal:      General: Bowel sounds are normal. There is no distension.      Palpations: Abdomen is soft. There is no mass.      Tenderness: There is no abdominal tenderness. There is no guarding.   Musculoskeletal:         General: Normal range of motion.      Cervical back: Normal range of motion and neck supple.   Lymphadenopathy:      Cervical: No cervical adenopathy.   Skin:     General: Skin is warm and dry.      Coloration: Skin is not pale.      Findings: No rash.   Neurological:      Mental Status: He is alert and oriented to person, place, and time.      Cranial Nerves: No cranial nerve deficit.      Deep Tendon Reflexes: Reflexes are normal and symmetric.              CRANIAL NERVES     CN III, IV, VI   Pupils are equal, round, and reactive to light.       Significant Labs: All pertinent labs within the past 24 hours have been reviewed.  CBC:   Recent Labs   Lab 12/16/23  1522 12/17/23  0457   WBC 4.38 4.95   HGB 12.4* 11.8*   HCT 37.4* 35.2*   PLT 93* 82*     CMP:   Recent Labs   Lab  "12/16/23  1521 12/17/23  0457    140   K 4.0 3.2*    104   CO2 26 24   * 119*   BUN 15 8   CREATININE 0.8 0.7   CALCIUM 8.9 7.9*   PROT 6.9 5.9*   ALBUMIN 3.6 3.3*   BILITOT 1.2* 0.8   ALKPHOS 90 78   * 74*   ALT 69* 53*   ANIONGAP 15 12     Lactic Acid: No results for input(s): "LACTATE" in the last 48 hours.  Lipase:   Recent Labs   Lab 12/16/23  1521   LIPASE 11     Troponin:   Recent Labs   Lab 12/16/23  1521   TROPONINI 0.008     TSH: No results for input(s): "TSH" in the last 4320 hours.  Urine Studies:   Recent Labs   Lab 12/16/23  1741   COLORU Yellow   APPEARANCEUA Clear   PHUR >=9.0   SPECGRAV 1.015   PROTEINUA 2+*   GLUCUA Trace*   KETONESU 2+*   BILIRUBINUA 1+*   OCCULTUA Negative   NITRITE Negative   UROBILINOGEN 1.0   LEUKOCYTESUR Negative   RBCUA 1   WBCUA 1   BACTERIA Rare   HYALINECASTS 0   CT scan:    1. Diverticulosis of the sigmoid colon with associated mild wall thickening.  Mild colitis is a consideration.  Follow-up colonoscopy may better characterize.  2. Mild hepatomegaly with hepatic steatosis.  3. Small hiatal hernia.  4. Small fat containing umbilical hernia.  5. This report was flagged in Epic as abnormal.       Assessment/Plan:     Tobacco abuse  He declined nicotine patch   Counseled       Major depressive disorder with current active episode  Psych help appreciated       Colitis  Check C diff   H/o antibiotics 1 week ago   Will start ciprofloxacin         Alcohol withdrawal syndrome with perceptual disturbance  He stopped drinking alcohol 4 days ago .   He drinks ETOH daily (1/2 gallon of Vodka)  Confusing history   Still very tremulous  Continue banana bag  Continue ativan   Continue IVF              VTE Risk Mitigation (From admission, onward)           Ordered     IP VTE LOW RISK PATIENT  Once         12/16/23 2233     Place sequential compression device  Until discontinued         12/16/23 2233                  Critical care time spent on the evaluation " and treatment of severe organ dysfunction, review of pertinent labs and imaging studies, discussions with consulting providers and discussions with patient/family: 35 minutes.             Pharmacist Renal Dose Adjustment Note    Maicol Millan III is a 53 y.o. male being treated with the medication CIPRO    Patient Data:    Vital Signs (Most Recent):  Temp: 98.4 °F (36.9 °C) (12/17/23 0730)  Pulse: 86 (12/17/23 0800)  Resp: (!) 21 (12/17/23 0723)  BP: (!) 141/79 (12/17/23 0800)  SpO2: (!) 94 % (12/17/23 0800) Vital Signs (72h Range):  Temp:  [96.2 °F (35.7 °C)-98.5 °F (36.9 °C)]   Pulse:  [69-93]   Resp:  [12-22]   BP: (116-165)/()   SpO2:  [90 %-98 %]      Recent Labs   Lab 12/16/23  1521 12/17/23  0457   CREATININE 0.8 0.7     Serum creatinine: 0.7 mg/dL 12/17/23 0457  Estimated creatinine clearance: 130.9 mL/min    Medication: CIPRO  dose: 500MG  frequency Q12H will be changed to medication: CIPRO  dose: 750MG frequency:  Q12H    Pharmacist's Name: Ching BaileyD   Pharmacist's Extension: [id number]      Kaylynn Gottlieb MD  Department of Hospital Medicine  [facility] - Intensive Care

## 2023-12-17 NOTE — CONSULTS
[facility] - Intensive Care  Neurology  Consult Note    Patient Name: Maicol Millan III  MRN: [id number]  Admission Date: 12/16/2023  Hospital Length of Stay: 1 days  Code Status: Full Code   Attending Provider: Kaylynn Gottlieb MD   Consulting Provider: Robbie Terry MD  Primary Care Physician: No, Primary Doctor  Principal Problem:<principal problem not specified>    Inpatient consult to Telemedicine-General Neurology  Consult performed by: Robbie Terry MD  Consult ordered by: Danielle Branch NP  Reason for consult: Delirium tremens and leg weakness  Assessment/Recommendations: Patient's last drink was 5 days ago and he complains of shaking in all 4 limbs with improvement in leg weakness.  Given the bilateral leg weakness combined with negative myoclonus on the exam, it supports the diagnosis of delirium tremens rather than weakness originating from underlying neurological problem.  However, prolonged alcoholism can cause alcoholic neuropathy in the legs that may produce leg weakness and falls.    -continue management with [facility] protocol  -neuropathy labs - B1,B6,B12,Vit E, HIV, RPR, Folate, TSH, SPEP, UPEP, Heavy metals, SHAUN, RF, Copper   -No imaging's at this standpoint / If symptoms persisting, labs unremarkable likely consider EMG/NCS in the outpatient setting               Subjective:     Chief Complaint:  Delirium tremens and leg weakness     HPI:   Maicol Millan III is a 53 y.o. male with PMH of drug addiction, ETOH abuse, Bipolar DO,   COPD, PTSD, chronic back pain who presents to the ED for evaluation of abdominal pain, nausea, and vomiting.  Patient reports that symptoms have been present for the last 3 days with multiple episodes of bilious, nonbloody emesis.  He also reports associated periumbilical abdominal pain that is constant .  There was report of leg weakness and hence, the tele neurology is consulted.  History is obtained from the patient and chart reviewed.  Patient  reports that his leg weakness is improving now.  His major problem was shaking of both hands and both legs since past few days.  And shaking has been better after he is getting treatment with Ativan.  No other focal neurological deficits are appreciated on the exam.    Past Medical History:  Past Medical History:   Diagnosis Date    Addiction to drug     Alcohol abuse     last drink 9/06/22    Anxiety     Bipolar disorder     COPD (chronic obstructive pulmonary disease)     Depression     Disc disorder     Fatigue     Headache     History of psychiatric hospitalization     Hx of psychiatric care     Panic disorder     Psychiatric problem     PTSD (post-traumatic stress disorder)     Sleep difficulties     Suicide attempt     Therapy     Withdrawal symptoms, alcohol       Past Surgical History:  Past Surgical History:   Procedure Laterality Date    APPENDECTOMY      COLONOSCOPY      polyps    HERNIA REPAIR      x 2      Allergies:  Review of patient's allergies indicates:   Allergen Reactions    Diphenoxylate-atropine Shortness Of Breath and Other (See Comments)     Esophagus tightens       Family History:  Family History   Problem Relation Age of Onset    Anxiety disorder Mother     Depression Mother     Heart block Father     No Known Problems Sister     No Known Problems Brother     Alcohol abuse Maternal Grandfather     Alcohol abuse Paternal Grandfather       Social History:   reports that he has been smoking cigarettes. He started smoking about 37 years ago. He has a 37.9 pack-year smoking history. He has never used smokeless tobacco. He reports current alcohol use. He reports that he does not currently use drugs after having used the following drugs: Marijuana.       Review of Systems:  14 point ROS was obtained and is negative except mentioned in HPI    OBJECTIVE:     Vital Signs (Most Recent):  Temp: 97.8 °F (36.6 °C) (12/17/23 1130)  Pulse: 84 (12/17/23 1130)  Resp: 18 (12/17/23 1130)  BP: (!) 148/83  "(12/17/23 1130)  SpO2: 95 % (12/17/23 1130) Vital Signs (24h Range):  Temp:  [96.2 °F (35.7 °C)-98.5 °F (36.9 °C)] 97.8 °F (36.6 °C)  Pulse:  [] 84  Resp:  [12-22] 18  SpO2:  [90 %-98 %] 95 %  BP: (116-165)/() 148/83       Physical Exam:  Physical Exam: Limited due to telehealth visit  General: well developed, well nourished  Head/Neck: atraumatic  Eyes: clear sclera  Respiratory: normal respiratory effort  Skin: clear skin, no visible bruising or rash    Neuro:   LOC: alert and follows requests  Language: No aphasia  Speech: No dysarthria  Orientation: Person, Place, Time  Memory: Recent memory intact, Remote memory intact, Age correct, Month correct  Visual Fields (CN II): Unable to obtain due to televisit  EOM (CN III, IV, VI): Full/intact  Oculocephalics: normal  Pupils (CN III, IV, VI): Unable to obtain due to televisit  Facial Sensation (CN V): Unable to obtain due to televisit  Facial Movement (CN VII): symmetric facial expression  Hearing (CN VIII): intact bilaterally  Gag Reflex (CN IX, X): Unable to obtain due to televisit  Shoulder/Neck (CN XI): Shoulder Shrug: Normal/Symmetric  Tongue (CN XII): to midline  Reflexes: not examined  Motor: No drift or obvious weakness noted on exam.  Patient has negative myoclonus on fully outstretched hands  Cerebellar: Normal limb, Normal stance  Sensation: Unable to obtain due to televisit    Labs:  CBC/Anemia Profile:   Recent Labs   Lab 12/16/23  1522 12/16/23  2102 12/17/23  0457   WBC 4.38  --  4.95   HGB 12.4*  --  11.8*   HCT 37.4*  --  35.2*   PLT 93*  --  82*   MCV 98  --  98   RDW 16.6*  --  16.4*   FOLATE  --  >40.0*  --    [id number]  --  520  --         Coags:   No results for input(s): "PT", "INR", "APTT" in the last 168 hours.     Chemistries:   Recent Labs   Lab 12/16/23  1521 12/17/23  0457    140   K 4.0 3.2*    104   CO2 26 24   BUN 15 8   CREATININE 0.8 0.7   CALCIUM 8.9 7.9*   PROT 6.9 5.9*   BILITOT 1.2* 0.8   ALKPHOS 90 78 "   ALT 69* 53*   * 74*   MG 1.2* 2.0   PHOS  --  1.5*        Imaging:   Imaging: Images were reviewed remotely as they were acquired.    CTH:  No acute intracranial abnormality               Thank you for your consult.           Robbie Terry MD, MHA  Fellow, NeuroEndovascular Surgery, [facility]pathi  Neurologist, [facility] [place], [place]

## 2023-12-17 NOTE — PLAN OF CARE
Patient voiced understanding of plan of care. Patient resting comfortably in bed. No reports of pain. VSS. AAO X 4. CIWA protocol in place. No shortness of breath, chest pain, nausea. Saline lock maintained for IV fluids. NPO. Voids without difficulty. LBM today - diarrhea x2 - up to bedside commode with assistance. No acute distress noted. Fall/Safety maintained, bed low, locked, side rails up x 2, call bell within reach. All needs met at this time. Pt instructed to call with any new needs. Pt with slip resistant socks on; remains free of fall or injury. Patient reports having difficulty sleeping - note left for provider. Discussed smoking cessation - patient declines nicotine patch.

## 2023-12-17 NOTE — SUBJECTIVE & OBJECTIVE
Past Medical History:   Diagnosis Date    Addiction to drug     Alcohol abuse     last drink 9/06/22    Anxiety     Bipolar disorder     COPD (chronic obstructive pulmonary disease)     Depression     Disc disorder     Fatigue     Headache     History of psychiatric hospitalization     Hx of psychiatric care     Panic disorder     Psychiatric problem     PTSD (post-traumatic stress disorder)     Sleep difficulties     Suicide attempt     Therapy     Withdrawal symptoms, alcohol        Past Surgical History:   Procedure Laterality Date    APPENDECTOMY      COLONOSCOPY      polyps    HERNIA REPAIR      x 2       Review of patient's allergies indicates:   Allergen Reactions    Diphenoxylate-atropine Shortness Of Breath and Other (See Comments)     Esophagus tightens       No current facility-administered medications on file prior to encounter.     Current Outpatient Medications on File Prior to Encounter   Medication Sig    albuterol (PROVENTIL/VENTOLIN HFA) 90 mcg/actuation inhaler Inhale 2 puffs into the lungs every 4 (four) hours as needed for Wheezing or Shortness of Breath.    buPROPion (WELLBUTRIN XL) 300 MG 24 hr tablet Take 1 tablet (300 mg total) by mouth once daily.    busPIRone (BUSPAR) 15 MG tablet Take 15 mg by mouth 3 (three) times daily.    diclofenac (VOLTAREN) 75 MG EC tablet Take 75 mg by mouth 2 (two) times daily.    famotidine (PEPCID) 20 MG tablet Take 20 mg by mouth 2 (two) times daily.    fluticasone propionate (FLOVENT HFA) 110 mcg/actuation inhaler Inhale 2 puffs into the lungs 2 (two) times daily. Controller    hydrOXYzine HCL (ATARAX) 10 MG Tab Take 25 mg by mouth every evening.    mirtazapine (REMERON) 45 MG tablet Take 45 mg by mouth every evening.    naltrexone (DEPADE) 50 mg tablet Take 50 mg by mouth once daily.    propranoloL (INDERAL) 10 MG tablet Take 10 mg by mouth 2 (two) times daily.    rOPINIRole (REQUIP) 2 MG tablet TAKE ONE TABLET MY MOUTH DAILY 1 3 HOURS BEFORE BEDTIME     tiotropium-olodateroL (STIOLTO RESPIMAT) 2.5-2.5 mcg/actuation Mist Stiolto Respimat 2.5 mcg-2.5 mcg/actuation solution for inhalation   INHALE 2 PUFF(S) EVERY DAY BY INHALATION ROUTE AS DIRECTED.     Family History       Problem Relation (Age of Onset)    Alcohol abuse Maternal Grandfather, Paternal Grandfather    Anxiety disorder Mother    Depression Mother    Heart block Father    No Known Problems Sister, Brother          Tobacco Use    Smoking status: Every Day     Current packs/day: 1.00     Average packs/day: 1 pack/day for 37.9 years (37.9 ttl pk-yrs)     Types: Cigarettes     Start date: 1/8/1986    Smokeless tobacco: Never   Substance and Sexual Activity    Alcohol use: Yes     Comment: daily drinker--alcohol abuse    Drug use: Not Currently     Types: Marijuana    Sexual activity: Yes     Birth control/protection: None     Review of Systems   Constitutional:  Positive for fatigue. Negative for chills and fever.   HENT:  Negative for congestion, postnasal drip and sore throat.    Eyes:  Negative for photophobia.   Respiratory:  Negative for chest tightness and shortness of breath.    Cardiovascular:  Negative for chest pain.   Gastrointestinal:  Positive for abdominal pain, diarrhea and vomiting. Negative for abdominal distention and blood in stool.   Genitourinary:  Negative for dysuria, flank pain and hematuria.   Musculoskeletal:  Negative for back pain.   Skin:  Negative for pallor.   Neurological:  Positive for dizziness and weakness. Negative for seizures, facial asymmetry, speech difficulty and numbness.   Hematological:  Does not bruise/bleed easily.   Psychiatric/Behavioral:  Positive for hallucinations. Negative for agitation and suicidal ideas. The patient is not nervous/anxious.         Hearing music ;unable to sleep      Objective:     Vital Signs (Most Recent):  Temp: 98.4 °F (36.9 °C) (12/17/23 0730)  Pulse: 86 (12/17/23 0800)  Resp: (!) 21 (12/17/23 0723)  BP: (!) 141/79 (12/17/23  0800)  SpO2: (!) 94 % (12/17/23 0800) Vital Signs (24h Range):  Temp:  [96.2 °F (35.7 °C)-98.5 °F (36.9 °C)] 98.4 °F (36.9 °C)  Pulse:  [69-93] 86  Resp:  [12-22] 21  SpO2:  [90 %-98 %] 94 %  BP: (116-165)/() 141/79     Weight: 85.4 kg (188 lb 4.4 oz)  Body mass index is 25.53 kg/m².     Physical Exam  Vitals and nursing note reviewed.   Constitutional:       Appearance: He is well-developed.   HENT:      Head: Normocephalic and atraumatic.      Nose: Nose normal.   Eyes:      Conjunctiva/sclera: Conjunctivae normal.      Pupils: Pupils are equal, round, and reactive to light.   Neck:      Thyroid: No thyromegaly.      Vascular: No JVD.   Cardiovascular:      Rate and Rhythm: Normal rate and regular rhythm.      Heart sounds: Normal heart sounds.   Pulmonary:      Effort: Pulmonary effort is normal.      Breath sounds: Normal breath sounds.   Abdominal:      General: Bowel sounds are normal. There is no distension.      Palpations: Abdomen is soft. There is no mass.      Tenderness: There is no abdominal tenderness. There is no guarding.   Musculoskeletal:         General: Normal range of motion.      Cervical back: Normal range of motion and neck supple.   Lymphadenopathy:      Cervical: No cervical adenopathy.   Skin:     General: Skin is warm and dry.      Coloration: Skin is not pale.      Findings: No rash.   Neurological:      Mental Status: He is alert and oriented to person, place, and time.      Cranial Nerves: No cranial nerve deficit.      Deep Tendon Reflexes: Reflexes are normal and symmetric.              CRANIAL NERVES     CN III, IV, VI   Pupils are equal, round, and reactive to light.       Significant Labs: All pertinent labs within the past 24 hours have been reviewed.  CBC:   Recent Labs   Lab 12/16/23  1522 12/17/23  0457   WBC 4.38 4.95   HGB 12.4* 11.8*   HCT 37.4* 35.2*   PLT 93* 82*     CMP:   Recent Labs   Lab 12/16/23  1521 12/17/23  0457    140   K 4.0 3.2*    104   CO2  "26 24   * 119*   BUN 15 8   CREATININE 0.8 0.7   CALCIUM 8.9 7.9*   PROT 6.9 5.9*   ALBUMIN 3.6 3.3*   BILITOT 1.2* 0.8   ALKPHOS 90 78   * 74*   ALT 69* 53*   ANIONGAP 15 12     Lactic Acid: No results for input(s): "LACTATE" in the last 48 hours.  Lipase:   Recent Labs   Lab 12/16/23  1521   LIPASE 11     Troponin:   Recent Labs   Lab 12/16/23  1521   TROPONINI 0.008     TSH: No results for input(s): "TSH" in the last 4320 hours.  Urine Studies:   Recent Labs   Lab 12/16/23  1741   COLORU Yellow   APPEARANCEUA Clear   PHUR >=9.0   SPECGRAV 1.015   PROTEINUA 2+*   GLUCUA Trace*   KETONESU 2+*   BILIRUBINUA 1+*   OCCULTUA Negative   NITRITE Negative   UROBILINOGEN 1.0   LEUKOCYTESUR Negative   RBCUA 1   WBCUA 1   BACTERIA Rare   HYALINECASTS 0   CT scan:    1. Diverticulosis of the sigmoid colon with associated mild wall thickening.  Mild colitis is a consideration.  Follow-up colonoscopy may better characterize.  2. Mild hepatomegaly with hepatic steatosis.  3. Small hiatal hernia.  4. Small fat containing umbilical hernia.  5. This report was flagged in Epic as abnormal.       "

## 2023-12-17 NOTE — HPI
Maicol Millan III is a 53 y.o. male with PMH of drug addiction, ETOH abuse, Bipolar DO,   COPD, PTSD, chronic back pain who presents to the ED for evaluation of abdominal pain, nausea, and vomiting.  Patient reports that symptoms have been present for the last 3 days with multiple episodes of bilious, nonbloody emesis.  He also reports associated periumbilical abdominal pain that is constant.  He denies alleviating or exacerbating factors.  2 days ago, his arms and legs began shaking uncontrollably. He is having difficulty walking and fell at home while in the bathtub. He was able to catch his fall with his hands. He denies any head trauma or LOC. Denies any injuries from fall. He has been having L shoulder pain from a previous fall for the past 2 weeks. He drinks ETOH daily (1/2 gallon of Vodka). His last drink was yesterday PM at about 7:30 but he is having difficulty holding fluids down. He denies any neck or back pain. Denies numbness/tingling/saddle anesthesia.   PSH significant for appendectomy and inguinal hernia repair X 2.    He has been admitted for alcohol with drawl , colitis .  He is having diarrhea for few days ; took antibiotics for respiratory reasons.smoker ;refuses nicotine patch   C/o severe insomnia . He still has tremors

## 2023-12-17 NOTE — NURSING
Telemedicine neurology consult called to [phone] - message left on voicemail. Psychiatric consult called to [facility] - spoke with Abida.

## 2023-12-18 VITALS
SYSTOLIC BLOOD PRESSURE: 138 MMHG | DIASTOLIC BLOOD PRESSURE: 89 MMHG | TEMPERATURE: 98 F | HEIGHT: 72 IN | BODY MASS INDEX: 25.5 KG/M2 | HEART RATE: 84 BPM | WEIGHT: 188.25 LBS | OXYGEN SATURATION: 96 % | RESPIRATION RATE: 36 BRPM

## 2023-12-18 LAB
ALBUMIN SERPL BCP-MCNC: 3.3 G/DL (ref 3.5–5.2)
ALP SERPL-CCNC: 74 U/L (ref 55–135)
ALT SERPL W/O P-5'-P-CCNC: 55 U/L (ref 10–44)
ANION GAP SERPL CALC-SCNC: 9 MMOL/L (ref 8–16)
AST SERPL-CCNC: 78 U/L (ref 10–40)
BASOPHILS # BLD AUTO: 0.04 K/UL (ref 0–0.2)
BASOPHILS NFR BLD: 0.9 % (ref 0–1.9)
BILIRUB SERPL-MCNC: 0.9 MG/DL (ref 0.1–1)
BUN SERPL-MCNC: 4 MG/DL (ref 6–20)
CALCIUM SERPL-MCNC: 8.2 MG/DL (ref 8.7–10.5)
CHLORIDE SERPL-SCNC: 106 MMOL/L (ref 95–110)
CO2 SERPL-SCNC: 25 MMOL/L (ref 23–29)
CREAT SERPL-MCNC: 0.8 MG/DL (ref 0.5–1.4)
DIFFERENTIAL METHOD: ABNORMAL
E COLI SXT1 STL QL IA: NEGATIVE
E COLI SXT2 STL QL IA: NEGATIVE
EOSINOPHIL # BLD AUTO: 0.1 K/UL (ref 0–0.5)
EOSINOPHIL NFR BLD: 3.1 % (ref 0–8)
ERYTHROCYTE [DISTWIDTH] IN BLOOD BY AUTOMATED COUNT: 16.3 % (ref 11.5–14.5)
EST. GFR  (NO RACE VARIABLE): >60 ML/MIN/1.73 M^2
GLUCOSE SERPL-MCNC: 104 MG/DL (ref 70–110)
HCT VFR BLD AUTO: 35.6 % (ref 40–54)
HGB BLD-MCNC: 11.8 G/DL (ref 14–18)
IMM GRANULOCYTES # BLD AUTO: 0.02 K/UL (ref 0–0.04)
IMM GRANULOCYTES NFR BLD AUTO: 0.4 % (ref 0–0.5)
LYMPHOCYTES # BLD AUTO: 1.2 K/UL (ref 1–4.8)
LYMPHOCYTES NFR BLD: 25.7 % (ref 18–48)
MCH RBC QN AUTO: 32.7 PG (ref 27–31)
MCHC RBC AUTO-ENTMCNC: 33.1 G/DL (ref 32–36)
MCV RBC AUTO: 99 FL (ref 82–98)
MONOCYTES # BLD AUTO: 0.4 K/UL (ref 0.3–1)
MONOCYTES NFR BLD: 8.6 % (ref 4–15)
NEUTROPHILS # BLD AUTO: 2.8 K/UL (ref 1.8–7.7)
NEUTROPHILS NFR BLD: 61.3 % (ref 38–73)
NRBC BLD-RTO: 0 /100 WBC
PLATELET # BLD AUTO: 79 K/UL (ref 150–450)
PMV BLD AUTO: 10.4 FL (ref 9.2–12.9)
POTASSIUM SERPL-SCNC: 3.5 MMOL/L (ref 3.5–5.1)
PROT SERPL-MCNC: 6 G/DL (ref 6–8.4)
RBC # BLD AUTO: 3.61 M/UL (ref 4.6–6.2)
SODIUM SERPL-SCNC: 140 MMOL/L (ref 136–145)
WBC # BLD AUTO: 4.52 K/UL (ref 3.9–12.7)

## 2023-12-18 PROCEDURE — 63600175 PHARM REV CODE 636 W HCPCS: Performed by: NURSE PRACTITIONER

## 2023-12-18 PROCEDURE — 94761 N-INVAS EAR/PLS OXIMETRY MLT: CPT

## 2023-12-18 PROCEDURE — 85025 COMPLETE CBC W/AUTO DIFF WBC: CPT | Performed by: INTERNAL MEDICINE

## 2023-12-18 PROCEDURE — 36415 COLL VENOUS BLD VENIPUNCTURE: CPT | Performed by: INTERNAL MEDICINE

## 2023-12-18 PROCEDURE — 96376 TX/PRO/DX INJ SAME DRUG ADON: CPT

## 2023-12-18 PROCEDURE — 96361 HYDRATE IV INFUSION ADD-ON: CPT

## 2023-12-18 PROCEDURE — G0378 HOSPITAL OBSERVATION PER HR: HCPCS

## 2023-12-18 PROCEDURE — 80053 COMPREHEN METABOLIC PANEL: CPT | Performed by: INTERNAL MEDICINE

## 2023-12-18 PROCEDURE — 25000003 PHARM REV CODE 250: Performed by: PHYSICIAN ASSISTANT

## 2023-12-18 PROCEDURE — 25000003 PHARM REV CODE 250: Performed by: INTERNAL MEDICINE

## 2023-12-18 PROCEDURE — 99238 PR HOSPITAL DISCHARGE DAY,<30 MIN: ICD-10-PCS | Mod: ,,, | Performed by: PHYSICIAN ASSISTANT

## 2023-12-18 PROCEDURE — 99238 HOSP IP/OBS DSCHRG MGMT 30/<: CPT | Mod: ,,, | Performed by: PHYSICIAN ASSISTANT

## 2023-12-18 RX ORDER — LORAZEPAM 2 MG/ML
2 INJECTION INTRAMUSCULAR EVERY 6 HOURS PRN
Status: DISCONTINUED | OUTPATIENT
Start: 2023-12-18 | End: 2023-12-18 | Stop reason: HOSPADM

## 2023-12-18 RX ORDER — DIAZEPAM 5 MG/1
5 TABLET ORAL 3 TIMES DAILY
Status: DISCONTINUED | OUTPATIENT
Start: 2023-12-18 | End: 2023-12-18 | Stop reason: HOSPADM

## 2023-12-18 RX ORDER — PANTOPRAZOLE SODIUM 40 MG/1
40 TABLET, DELAYED RELEASE ORAL DAILY
Status: DISCONTINUED | OUTPATIENT
Start: 2023-12-18 | End: 2023-12-18 | Stop reason: HOSPADM

## 2023-12-18 RX ORDER — DIAZEPAM 5 MG/1
5 TABLET ORAL EVERY 6 HOURS PRN
Status: DISCONTINUED | OUTPATIENT
Start: 2023-12-18 | End: 2023-12-18

## 2023-12-18 RX ADMIN — PANTOPRAZOLE SODIUM 40 MG: 40 TABLET, DELAYED RELEASE ORAL at 08:12

## 2023-12-18 RX ADMIN — LORAZEPAM 2 MG: 2 INJECTION INTRAMUSCULAR; INTRAVENOUS at 03:12

## 2023-12-18 RX ADMIN — DEXTROSE AND SODIUM CHLORIDE: 5; 900 INJECTION, SOLUTION INTRAVENOUS at 02:12

## 2023-12-18 RX ADMIN — CIPROFLOXACIN 750 MG: 250 TABLET, COATED ORAL at 08:12

## 2023-12-18 RX ADMIN — DIAZEPAM 5 MG: 5 TABLET ORAL at 08:12

## 2023-12-18 RX ADMIN — MUPIROCIN: 20 OINTMENT TOPICAL at 08:12

## 2023-12-18 NOTE — ASSESSMENT & PLAN NOTE
He stopped drinking alcohol 4 days ago .   He drinks ETOH daily (1/2 gallon of Vodka)  Confusing history   Still very tremulous  Continue banana bag  Continue ativan   Continue IVF      D/C IV fluids.  Plan for scheduled valium and admission to U

## 2023-12-18 NOTE — NURSING
"Patient more restless, agitated, and having increased tremors. Getting out of bed to clean room. PCT assisted patient to brush teeth, shampoo hair, and "clean up" room as he requested.   "

## 2023-12-18 NOTE — SUBJECTIVE & OBJECTIVE
Past Medical History:   Diagnosis Date    Addiction to drug     Alcohol abuse     last drink 9/06/22    Anxiety     Bipolar disorder     COPD (chronic obstructive pulmonary disease)     Depression     Disc disorder     Fatigue     Headache     History of psychiatric hospitalization     Hx of psychiatric care     Panic disorder     Psychiatric problem     PTSD (post-traumatic stress disorder)     Sleep difficulties     Suicide attempt     Therapy     Withdrawal symptoms, alcohol        Past Surgical History:   Procedure Laterality Date    APPENDECTOMY      COLONOSCOPY      polyps    HERNIA REPAIR      x 2       Review of patient's allergies indicates:   Allergen Reactions    Diphenoxylate-atropine Shortness Of Breath and Other (See Comments)     Esophagus tightens       No current facility-administered medications on file prior to encounter.     Current Outpatient Medications on File Prior to Encounter   Medication Sig    albuterol (PROVENTIL/VENTOLIN HFA) 90 mcg/actuation inhaler Inhale 2 puffs into the lungs every 4 (four) hours as needed for Wheezing or Shortness of Breath.    buPROPion (WELLBUTRIN XL) 300 MG 24 hr tablet Take 1 tablet (300 mg total) by mouth once daily.    busPIRone (BUSPAR) 15 MG tablet Take 15 mg by mouth 3 (three) times daily.    diclofenac (VOLTAREN) 75 MG EC tablet Take 75 mg by mouth 2 (two) times daily.    famotidine (PEPCID) 20 MG tablet Take 20 mg by mouth 2 (two) times daily.    fluticasone propionate (FLOVENT HFA) 110 mcg/actuation inhaler Inhale 2 puffs into the lungs 2 (two) times daily. Controller    hydrOXYzine HCL (ATARAX) 10 MG Tab Take 25 mg by mouth every evening.    mirtazapine (REMERON) 45 MG tablet Take 45 mg by mouth every evening.    naltrexone (DEPADE) 50 mg tablet Take 50 mg by mouth once daily.    propranoloL (INDERAL) 10 MG tablet Take 10 mg by mouth 2 (two) times daily.    rOPINIRole (REQUIP) 2 MG tablet TAKE ONE TABLET MY MOUTH DAILY 1 3 HOURS BEFORE BEDTIME     tiotropium-olodateroL (STIOLTO RESPIMAT) 2.5-2.5 mcg/actuation Mist Stiolto Respimat 2.5 mcg-2.5 mcg/actuation solution for inhalation   INHALE 2 PUFF(S) EVERY DAY BY INHALATION ROUTE AS DIRECTED.     Family History       Problem Relation (Age of Onset)    Alcohol abuse Maternal Grandfather, Paternal Grandfather    Anxiety disorder Mother    Depression Mother    Heart block Father    No Known Problems Sister, Brother          Tobacco Use    Smoking status: Every Day     Current packs/day: 1.00     Average packs/day: 1 pack/day for 37.9 years (37.9 ttl pk-yrs)     Types: Cigarettes     Start date: 1/8/1986    Smokeless tobacco: Never   Substance and Sexual Activity    Alcohol use: Yes     Comment: daily drinker--alcohol abuse    Drug use: Not Currently     Types: Marijuana    Sexual activity: Yes     Birth control/protection: None     Review of Systems   Constitutional:  Negative for chills, fatigue and fever.   HENT:  Negative for congestion, postnasal drip and sore throat.    Eyes:  Negative for photophobia.   Respiratory:  Negative for chest tightness and shortness of breath.    Cardiovascular:  Negative for chest pain.   Gastrointestinal:  Negative for abdominal distention, abdominal pain, blood in stool, diarrhea and vomiting.   Genitourinary:  Negative for dysuria, flank pain and hematuria.   Musculoskeletal:  Negative for back pain.   Skin:  Negative for pallor.   Neurological:  Positive for tremors. Negative for dizziness, seizures, facial asymmetry, speech difficulty, weakness and numbness.   Hematological:  Does not bruise/bleed easily.   Psychiatric/Behavioral:  Negative for agitation, hallucinations and suicidal ideas. The patient is nervous/anxious.         Hearing music ;unable to sleep      Objective:     Vital Signs (Most Recent):  Temp: 98.1 °F (36.7 °C) (12/18/23 0703)  Pulse: 84 (12/18/23 0903)  Resp: (!) 36 (12/18/23 0903)  BP: 138/89 (12/18/23 0903)  SpO2: 96 % (12/18/23 0905) Vital Signs (24h  Range):  Temp:  [96.8 °F (36 °C)-98.9 °F (37.2 °C)] 98.1 °F (36.7 °C)  Pulse:  [75-95] 84  Resp:  [17-36] 36  SpO2:  [91 %-99 %] 96 %  BP: (112-163)/(63-96) 138/89     Weight: 85.4 kg (188 lb 4.4 oz)  Body mass index is 25.53 kg/m².     Physical Exam  Vitals and nursing note reviewed.   Constitutional:       Appearance: He is well-developed.   HENT:      Head: Normocephalic and atraumatic.      Nose: Nose normal.   Eyes:      Conjunctiva/sclera: Conjunctivae normal.      Pupils: Pupils are equal, round, and reactive to light.   Neck:      Thyroid: No thyromegaly.      Vascular: No JVD.   Cardiovascular:      Rate and Rhythm: Normal rate and regular rhythm.      Heart sounds: Normal heart sounds.   Pulmonary:      Effort: Pulmonary effort is normal.      Breath sounds: Normal breath sounds.   Abdominal:      General: Bowel sounds are normal. There is no distension.      Palpations: Abdomen is soft. There is no mass.      Tenderness: There is no abdominal tenderness. There is no guarding.   Musculoskeletal:         General: Normal range of motion.      Cervical back: Normal range of motion and neck supple.   Lymphadenopathy:      Cervical: No cervical adenopathy.   Skin:     General: Skin is warm and dry.      Coloration: Skin is not pale.      Findings: No rash.   Neurological:      Mental Status: He is alert and oriented to person, place, and time.      Cranial Nerves: No cranial nerve deficit.      Deep Tendon Reflexes: Reflexes are normal and symmetric.      Comments: + tremor    Psychiatric:      Comments: + anxious               CRANIAL NERVES     CN III, IV, VI   Pupils are equal, round, and reactive to light.       Significant Labs: All pertinent labs within the past 24 hours have been reviewed.  CBC:   Recent Labs   Lab 12/16/23  1522 12/17/23  0457 12/18/23  0421   WBC 4.38 4.95 4.52   HGB 12.4* 11.8* 11.8*   HCT 37.4* 35.2* 35.6*   PLT 93* 82* 79*       CMP:   Recent Labs   Lab 12/16/23  1521 12/17/23  045  "12/18/23  0421    140 140   K 4.0 3.2* 3.5    104 106   CO2 26 24 25   * 119* 104   BUN 15 8 4*   CREATININE 0.8 0.7 0.8   CALCIUM 8.9 7.9* 8.2*   PROT 6.9 5.9* 6.0   ALBUMIN 3.6 3.3* 3.3*   BILITOT 1.2* 0.8 0.9   ALKPHOS 90 78 74   * 74* 78*   ALT 69* 53* 55*   ANIONGAP 15 12 9       Lactic Acid: No results for input(s): "LACTATE" in the last 48 hours.  Lipase:   Recent Labs   Lab 12/16/23  1521   LIPASE 11       Troponin:   Recent Labs   Lab 12/16/23  1521   TROPONINI 0.008       TSH: No results for input(s): "TSH" in the last 4320 hours.  Urine Studies:   Recent Labs   Lab 12/16/23  1741   COLORU Yellow   APPEARANCEUA Clear   PHUR >=9.0   SPECGRAV 1.015   PROTEINUA 2+*   GLUCUA Trace*   KETONESU 2+*   BILIRUBINUA 1+*   OCCULTUA Negative   NITRITE Negative   UROBILINOGEN 1.0   LEUKOCYTESUR Negative   RBCUA 1   WBCUA 1   BACTERIA Rare   HYALINECASTS 0     CT scan:    1. Diverticulosis of the sigmoid colon with associated mild wall thickening.  Mild colitis is a consideration.  Follow-up colonoscopy may better characterize.  2. Mild hepatomegaly with hepatic steatosis.  3. Small hiatal hernia.  4. Small fat containing umbilical hernia.  5. This report was flagged in Epic as abnormal.       "

## 2023-12-18 NOTE — ASSESSMENT & PLAN NOTE
He stopped drinking alcohol 4 days ago .   He drinks ETOH daily (1/2 gallon of Vodka)  Confusing history   Still very tremulous  Continue banana bag  Continue ativan   Continue IVF      D/C IV fluids.  Plan for scheduled valium and admission to [facility]HU   Lashawn recommended voluntary admission to U but patient now leaving A

## 2023-12-18 NOTE — DISCHARGE SUMMARY
[facility] Medicine  Discharge Summary      Patient Name: Maicol Millan III  MRN: [id number]  CLAY: [id number]  Patient Class: OP- Observation  Admission Date: 12/16/2023  Hospital Length of Stay: 1 days  Discharge Date and Time:  12/18/2023 1:06 PM  Attending Physician: Kaylynn Gottlieb MD   Discharging Provider: Gloria Anguiano PA-C  Primary Care Provider: Arline, Primary Doctor    Primary Care Team: Networked reference to record PCT     HPI:   Maicol Millan III is a 53 y.o. male with PMH of drug addiction, ETOH abuse, Bipolar DO,   COPD, PTSD, chronic back pain who presents to the ED for evaluation of abdominal pain, nausea, and vomiting.  Patient reports that symptoms have been present for the last 3 days with multiple episodes of bilious, nonbloody emesis.  He also reports associated periumbilical abdominal pain that is constant.  He denies alleviating or exacerbating factors.  2 days ago, his arms and legs began shaking uncontrollably. He is having difficulty walking and fell at home while in the bathtub. He was able to catch his fall with his hands. He denies any head trauma or LOC. Denies any injuries from fall. He has been having L shoulder pain from a previous fall for the past 2 weeks. He drinks ETOH daily (1/2 gallon of Vodka). His last drink was yesterday PM at about 7:30 but he is having difficulty holding fluids down. He denies any neck or back pain. Denies numbness/tingling/saddle anesthesia.   PSH significant for appendectomy and inguinal hernia repair X 2.    He has been admitted for alcohol with drawl , colitis .  He is having diarrhea for few days ; took antibiotics for respiratory reasons.smoker ;refuses nicotine patch   C/o severe insomnia . He still has tremors         * No surgery found *      Hospital Course:   Pt HD stable on room air.  Discussed started valium due to patient being anxious and recent alcohol WD.  Plan for admission to [facility] once medically cleared.       PT leaving AMA.  Patient understands risks of leaving AMA.       Goals of Care Treatment Preferences:  Code Status: Full Code      Consults:   Consults (From admission, onward)          Status Ordering Provider     Inpatient consult to Psychiatry  Once        Provider:  (Not yet assigned)    Ordered SHELTON ENRIQUE     Inpatient consult to Psychiatry  Once        Provider:  Francisco Benton MD    Completed JAVON GAONA     Inpatient consult to [facility]  Once        Provider:  (Not yet assigned)    Completed JAVON GAONA            GI  Colitis  Check C diff   H/o antibiotics 1 week ago   Will start ciprofloxacin         Other  Tobacco abuse  He declined nicotine patch   Counseled       Major depressive disorder with current active episode  Psych help appreciated         Alcohol withdrawal syndrome with perceptual disturbance  He stopped drinking alcohol 4 days ago .   He drinks ETOH daily (1/2 gallon of Vodka)  Confusing history   Still very tremulous  Continue banana bag  Continue ativan   Continue IVF      D/C IV fluids.  Plan for scheduled valium and admission to [facility]   Lashawn recommended voluntary admission to [facility] but patient now leaving AMA           Final Active Diagnoses:    Diagnosis Date Noted POA    Alcohol withdrawal syndrome with perceptual disturbance [F10.932] 12/17/2023 Yes    Colitis [K52.9] 12/17/2023 Yes    Major depressive disorder with current active episode [F32.9] 12/17/2023 Yes    Tobacco abuse [Z72.0] 12/17/2023 Yes      Problems Resolved During this Admission:       Discharged Condition: against medical advice    Disposition: Left Against Medical Adv*    Follow Up:    Patient Instructions:   No discharge procedures on file.    Significant Diagnostic Studies: see A&P     Pending Diagnostic Studies:       None           Medications:  Reconciled Home Medications:      Medication List        ASK your doctor about these medications      albuterol 90  mcg/actuation inhaler  Commonly known as: PROVENTIL/VENTOLIN HFA  Inhale 2 puffs into the lungs every 4 (four) hours as needed for Wheezing or Shortness of Breath.     buPROPion 300 MG 24 hr tablet  Commonly known as: WELLBUTRIN XL  Take 1 tablet (300 mg total) by mouth once daily.     busPIRone 15 MG tablet  Commonly known as: BUSPAR  Take 15 mg by mouth 3 (three) times daily.     diclofenac 75 MG EC tablet  Commonly known as: VOLTAREN  Take 75 mg by mouth 2 (two) times daily.     famotidine 20 MG tablet  Commonly known as: PEPCID  Take 20 mg by mouth 2 (two) times daily.     fluticasone propionate 110 mcg/actuation inhaler  Commonly known as: FLOVENT HFA  Inhale 2 puffs into the lungs 2 (two) times daily. Controller     hydrOXYzine HCL 10 MG Tab  Commonly known as: ATARAX  Take 25 mg by mouth every evening.     mirtazapine 45 MG tablet  Commonly known as: REMERON  Take 45 mg by mouth every evening.     naltrexone 50 mg tablet  Commonly known as: DEPADE  Take 50 mg by mouth once daily.     propranoloL 10 MG tablet  Commonly known as: INDERAL  Take 10 mg by mouth 2 (two) times daily.     rOPINIRole 2 MG tablet  Commonly known as: REQUIP  TAKE ONE TABLET MY MOUTH DAILY 1 3 HOURS BEFORE BEDTIME     tiotropium-olodateroL 2.5-2.5 mcg/actuation Mist  Commonly known as: STIOLTO RESPIMAT  Stiolto Respimat 2.5 mcg-2.5 mcg/actuation solution for inhalation   INHALE 2 PUFF(S) EVERY DAY BY INHALATION ROUTE AS DIRECTED.              Indwelling Lines/Drains at time of discharge:   Lines/Drains/Airways       None                   Time spent on the discharge of patient: 25 minutes    Critical care time spent on the evaluation and treatment of severe organ dysfunction, review of pertinent labs and imaging studies, discussions with consulting providers and discussions with patient/family: 25minutes.     Gloria Anguiano PA-C  Department of Hospital Medicine  [facility]

## 2023-12-18 NOTE — PLAN OF CARE
Plan of care reviewed with patient; voiced understanding. Patient resting comfortably in bed. No reports of pain. Ativan x1 last night for restlessness & agitation. VSS. AAO X 4. No shortness of breath, chest pain, nausea. Saline lock maintained for IV fluids. Full liquid diet - feeds self with no difficulty. Voids without difficulty. LBM today. C diff negative. Diarrhea x2 - immodium per patient request. No acute distress noted. Fall/Safety maintained, bed low, locked, side rails up x 2, call bell within reach. All needs met at this time. Pt instructed to call with any new needs. Pt with slip resistant socks on; remains free of fall or injury. Ambulatory in room with standby assistance. [facility] protocol.

## 2023-12-18 NOTE — HOSPITAL COURSE
Pt HD stable on room air.  Discussed started valium due to patient being anxious and recent alcohol WD.  Plan for admission to [facility] once medically cleared.      PT leaving AMA.  Patient understands risks of leaving AMA.

## 2023-12-18 NOTE — PROGRESS NOTES
[facility] Medicine  Progress Note    Patient Name: Maicol Millan III  MRN: [id number]  Patient Class: OP- Observation   Admission Date: 12/16/2023  Length of Stay: 1 days  Attending Physician: Kaylynn Gottlieb MD  Primary Care Provider: Arline, Primary Doctor        Subjective:     Principal Problem:<principal problem not specified>        HPI:  Maicol Millan III is a 53 y.o. male with PMH of drug addiction, ETOH abuse, Bipolar DO,   COPD, PTSD, chronic back pain who presents to the ED for evaluation of abdominal pain, nausea, and vomiting.  Patient reports that symptoms have been present for the last 3 days with multiple episodes of bilious, nonbloody emesis.  He also reports associated periumbilical abdominal pain that is constant.  He denies alleviating or exacerbating factors.  2 days ago, his arms and legs began shaking uncontrollably. He is having difficulty walking and fell at home while in the bathtub. He was able to catch his fall with his hands. He denies any head trauma or LOC. Denies any injuries from fall. He has been having L shoulder pain from a previous fall for the past 2 weeks. He drinks ETOH daily (1/2 gallon of Vodka). His last drink was yesterday PM at about 7:30 but he is having difficulty holding fluids down. He denies any neck or back pain. Denies numbness/tingling/saddle anesthesia.   PSH significant for appendectomy and inguinal hernia repair X 2.    He has been admitted for alcohol with drawl , colitis .  He is having diarrhea for few days ; took antibiotics for respiratory reasons.smoker ;refuses nicotine patch   C/o severe insomnia . He still has tremors         Overview/Hospital Course:  Pt HD stable on room air.  Discussed started valium due to patient being anxious and recent alcohol WD.  Plan for admission to [facility] once medically cleared.      Past Medical History:   Diagnosis Date    Addiction to drug     Alcohol abuse     last drink 9/06/22    Anxiety      Bipolar disorder     COPD (chronic obstructive pulmonary disease)     Depression     Disc disorder     Fatigue     Headache     History of psychiatric hospitalization     Hx of psychiatric care     Panic disorder     Psychiatric problem     PTSD (post-traumatic stress disorder)     Sleep difficulties     Suicide attempt     Therapy     Withdrawal symptoms, alcohol        Past Surgical History:   Procedure Laterality Date    APPENDECTOMY      COLONOSCOPY      polyps    HERNIA REPAIR      x 2       Review of patient's allergies indicates:   Allergen Reactions    Diphenoxylate-atropine Shortness Of Breath and Other (See Comments)     Esophagus tightens       No current facility-administered medications on file prior to encounter.     Current Outpatient Medications on File Prior to Encounter   Medication Sig    albuterol (PROVENTIL/VENTOLIN HFA) 90 mcg/actuation inhaler Inhale 2 puffs into the lungs every 4 (four) hours as needed for Wheezing or Shortness of Breath.    buPROPion (WELLBUTRIN XL) 300 MG 24 hr tablet Take 1 tablet (300 mg total) by mouth once daily.    busPIRone (BUSPAR) 15 MG tablet Take 15 mg by mouth 3 (three) times daily.    diclofenac (VOLTAREN) 75 MG EC tablet Take 75 mg by mouth 2 (two) times daily.    famotidine (PEPCID) 20 MG tablet Take 20 mg by mouth 2 (two) times daily.    fluticasone propionate (FLOVENT HFA) 110 mcg/actuation inhaler Inhale 2 puffs into the lungs 2 (two) times daily. Controller    hydrOXYzine HCL (ATARAX) 10 MG Tab Take 25 mg by mouth every evening.    mirtazapine (REMERON) 45 MG tablet Take 45 mg by mouth every evening.    naltrexone (DEPADE) 50 mg tablet Take 50 mg by mouth once daily.    propranoloL (INDERAL) 10 MG tablet Take 10 mg by mouth 2 (two) times daily.    rOPINIRole (REQUIP) 2 MG tablet TAKE ONE TABLET MY MOUTH DAILY 1 3 HOURS BEFORE BEDTIME    tiotropium-olodateroL (STIOLTO RESPIMAT) 2.5-2.5 mcg/actuation Mist Stiolto Respimat 2.5 mcg-2.5 mcg/actuation solution  for inhalation   INHALE 2 PUFF(S) EVERY DAY BY INHALATION ROUTE AS DIRECTED.     Family History       Problem Relation (Age of Onset)    Alcohol abuse Maternal Grandfather, Paternal Grandfather    Anxiety disorder Mother    Depression Mother    Heart block Father    No Known Problems Sister, Brother          Tobacco Use    Smoking status: Every Day     Current packs/day: 1.00     Average packs/day: 1 pack/day for 37.9 years (37.9 ttl pk-yrs)     Types: Cigarettes     Start date: 1/8/1986    Smokeless tobacco: Never   Substance and Sexual Activity    Alcohol use: Yes     Comment: daily drinker--alcohol abuse    Drug use: Not Currently     Types: Marijuana    Sexual activity: Yes     Birth control/protection: None     Review of Systems   Constitutional:  Negative for chills, fatigue and fever.   HENT:  Negative for congestion, postnasal drip and sore throat.    Eyes:  Negative for photophobia.   Respiratory:  Negative for chest tightness and shortness of breath.    Cardiovascular:  Negative for chest pain.   Gastrointestinal:  Negative for abdominal distention, abdominal pain, blood in stool, diarrhea and vomiting.   Genitourinary:  Negative for dysuria, flank pain and hematuria.   Musculoskeletal:  Negative for back pain.   Skin:  Negative for pallor.   Neurological:  Positive for tremors. Negative for dizziness, seizures, facial asymmetry, speech difficulty, weakness and numbness.   Hematological:  Does not bruise/bleed easily.   Psychiatric/Behavioral:  Negative for agitation, hallucinations and suicidal ideas. The patient is nervous/anxious.         Hearing music ;unable to sleep      Objective:     Vital Signs (Most Recent):  Temp: 98.1 °F (36.7 °C) (12/18/23 0703)  Pulse: 84 (12/18/23 0903)  Resp: (!) 36 (12/18/23 0903)  BP: 138/89 (12/18/23 0903)  SpO2: 96 % (12/18/23 0905) Vital Signs (24h Range):  Temp:  [96.8 °F (36 °C)-98.9 °F (37.2 °C)] 98.1 °F (36.7 °C)  Pulse:  [75-95] 84  Resp:  [17-36] 36  SpO2:  [91  %-99 %] 96 %  BP: (112-163)/(63-96) 138/89     Weight: 85.4 kg (188 lb 4.4 oz)  Body mass index is 25.53 kg/m².     Physical Exam  Vitals and nursing note reviewed.   Constitutional:       Appearance: He is well-developed.   HENT:      Head: Normocephalic and atraumatic.      Nose: Nose normal.   Eyes:      Conjunctiva/sclera: Conjunctivae normal.      Pupils: Pupils are equal, round, and reactive to light.   Neck:      Thyroid: No thyromegaly.      Vascular: No JVD.   Cardiovascular:      Rate and Rhythm: Normal rate and regular rhythm.      Heart sounds: Normal heart sounds.   Pulmonary:      Effort: Pulmonary effort is normal.      Breath sounds: Normal breath sounds.   Abdominal:      General: Bowel sounds are normal. There is no distension.      Palpations: Abdomen is soft. There is no mass.      Tenderness: There is no abdominal tenderness. There is no guarding.   Musculoskeletal:         General: Normal range of motion.      Cervical back: Normal range of motion and neck supple.   Lymphadenopathy:      Cervical: No cervical adenopathy.   Skin:     General: Skin is warm and dry.      Coloration: Skin is not pale.      Findings: No rash.   Neurological:      Mental Status: He is alert and oriented to person, place, and time.      Cranial Nerves: No cranial nerve deficit.      Deep Tendon Reflexes: Reflexes are normal and symmetric.      Comments: + tremor    Psychiatric:      Comments: + anxious               CRANIAL NERVES     CN III, IV, VI   Pupils are equal, round, and reactive to light.       Significant Labs: All pertinent labs within the past 24 hours have been reviewed.  CBC:   Recent Labs   Lab 12/16/23  1522 12/17/23  0457 12/18/23  0421   WBC 4.38 4.95 4.52   HGB 12.4* 11.8* 11.8*   HCT 37.4* 35.2* 35.6*   PLT 93* 82* 79*       CMP:   Recent Labs   Lab 12/16/23  1521 12/17/23  0457 12/18/23  0421    140 140   K 4.0 3.2* 3.5    104 106   CO2 26 24 25   * 119* 104   BUN 15 8 4*  "  CREATININE 0.8 0.7 0.8   CALCIUM 8.9 7.9* 8.2*   PROT 6.9 5.9* 6.0   ALBUMIN 3.6 3.3* 3.3*   BILITOT 1.2* 0.8 0.9   ALKPHOS 90 78 74   * 74* 78*   ALT 69* 53* 55*   ANIONGAP 15 12 9       Lactic Acid: No results for input(s): "LACTATE" in the last 48 hours.  Lipase:   Recent Labs   Lab 12/16/23  1521   LIPASE 11       Troponin:   Recent Labs   Lab 12/16/23  1521   TROPONINI 0.008       TSH: No results for input(s): "TSH" in the last 4320 hours.  Urine Studies:   Recent Labs   Lab 12/16/23  1741   COLORU Yellow   APPEARANCEUA Clear   PHUR >=9.0   SPECGRAV 1.015   PROTEINUA 2+*   GLUCUA Trace*   KETONESU 2+*   BILIRUBINUA 1+*   OCCULTUA Negative   NITRITE Negative   UROBILINOGEN 1.0   LEUKOCYTESUR Negative   RBCUA 1   WBCUA 1   BACTERIA Rare   HYALINECASTS 0     CT scan:    1. Diverticulosis of the sigmoid colon with associated mild wall thickening.  Mild colitis is a consideration.  Follow-up colonoscopy may better characterize.  2. Mild hepatomegaly with hepatic steatosis.  3. Small hiatal hernia.  4. Small fat containing umbilical hernia.  5. This report was flagged in Epic as abnormal.         Assessment/Plan:      Tobacco abuse  He declined nicotine patch   Counseled       Major depressive disorder with current active episode  Psych help appreciated         Colitis  Check C diff   H/o antibiotics 1 week ago   Will start ciprofloxacin         Alcohol withdrawal syndrome with perceptual disturbance  He stopped drinking alcohol 4 days ago .   He drinks ETOH daily (1/2 gallon of Vodka)  Confusing history   Still very tremulous  Continue banana bag  Continue ativan   Continue IVF      D/C IV fluids.  Plan for scheduled valium and admission to [facility]           VTE Risk Mitigation (From admission, onward)           Ordered     IP VTE LOW RISK PATIENT  Once         12/16/23 2233     Place sequential compression device  Until discontinued         12/16/23 2233                    Discharge Planning   JORDAN:      Code " Status: Full Code   Is the patient medically ready for discharge?:     Reason for patient still in hospital (select all that apply): Patient trending condition               Critical care time spent on the evaluation and treatment of severe organ dysfunction, review of pertinent labs and imaging studies, discussions with consulting providers and discussions with patient/family: 25 minutes.      Gloria Anguiano PA-C  Department of Hospital Medicine   [facility]

## 2023-12-18 NOTE — NURSING
Patient is wanting to leave AMA.  YANNICK Robles, spoke to POLA Castro.  She agreed. Patient and spouse both understand that him staying is best, they refused, signed paperwork and were escorted by security to front lobby.

## 2023-12-19 LAB — BACTERIA STL CULT: NORMAL

## 2024-02-20 ENCOUNTER — OFFICE VISIT (OUTPATIENT)
Dept: URGENT CARE | Facility: CLINIC | Age: 54
End: 2024-02-20
Payer: MEDICAID

## 2024-02-20 VITALS
WEIGHT: 179.88 LBS | SYSTOLIC BLOOD PRESSURE: 104 MMHG | HEART RATE: 83 BPM | BODY MASS INDEX: 24.36 KG/M2 | TEMPERATURE: 97 F | DIASTOLIC BLOOD PRESSURE: 74 MMHG | OXYGEN SATURATION: 98 % | HEIGHT: 72 IN | RESPIRATION RATE: 19 BRPM

## 2024-02-20 DIAGNOSIS — R42 DIZZINESS: ICD-10-CM

## 2024-02-20 DIAGNOSIS — U07.1 COVID-19: Primary | ICD-10-CM

## 2024-02-20 DIAGNOSIS — J44.1 COPD WITH ACUTE EXACERBATION: ICD-10-CM

## 2024-02-20 DIAGNOSIS — J34.9 SINUS PROBLEM: ICD-10-CM

## 2024-02-20 DIAGNOSIS — R06.2 WHEEZING: ICD-10-CM

## 2024-02-20 DIAGNOSIS — R11.2 NAUSEA AND VOMITING, UNSPECIFIED VOMITING TYPE: ICD-10-CM

## 2024-02-20 DIAGNOSIS — R19.7 DIARRHEA, UNSPECIFIED TYPE: ICD-10-CM

## 2024-02-20 LAB
CTP QC/QA: YES
SARS-COV-2 AG RESP QL IA.RAPID: POSITIVE

## 2024-02-20 PROCEDURE — 87811 SARS-COV-2 COVID19 W/OPTIC: CPT | Mod: QW,S$GLB,,

## 2024-02-20 PROCEDURE — 99213 OFFICE O/P EST LOW 20 MIN: CPT | Mod: S$GLB,,,

## 2024-02-20 RX ORDER — LOPERAMIDE HYDROCHLORIDE 2 MG/1
2 CAPSULE ORAL 4 TIMES DAILY PRN
Qty: 20 CAPSULE | Refills: 0 | Status: SHIPPED | OUTPATIENT
Start: 2024-02-20 | End: 2024-03-01

## 2024-02-20 RX ORDER — DOXYCYCLINE 100 MG/1
100 CAPSULE ORAL EVERY 12 HOURS
Qty: 14 CAPSULE | Refills: 0 | Status: SHIPPED | OUTPATIENT
Start: 2024-02-20 | End: 2024-02-27

## 2024-02-20 RX ORDER — FLUTICASONE PROPIONATE 50 MCG
1 SPRAY, SUSPENSION (ML) NASAL 2 TIMES DAILY
Qty: 16 G | Refills: 0 | Status: SHIPPED | OUTPATIENT
Start: 2024-02-20 | End: 2024-03-01

## 2024-02-20 RX ORDER — CETIRIZINE HYDROCHLORIDE 10 MG/1
10 TABLET ORAL DAILY
Qty: 30 TABLET | Refills: 0 | Status: ON HOLD | OUTPATIENT
Start: 2024-02-20 | End: 2024-03-21

## 2024-02-20 RX ORDER — MECLIZINE HYDROCHLORIDE 25 MG/1
25 TABLET ORAL 3 TIMES DAILY PRN
Qty: 20 TABLET | Refills: 0 | Status: ON HOLD | OUTPATIENT
Start: 2024-02-20 | End: 2024-06-06

## 2024-02-20 RX ORDER — PREDNISONE 20 MG/1
20 TABLET ORAL 2 TIMES DAILY
Qty: 10 TABLET | Refills: 0 | Status: SHIPPED | OUTPATIENT
Start: 2024-02-20 | End: 2024-02-25

## 2024-02-20 RX ORDER — BENZONATATE 200 MG/1
200 CAPSULE ORAL 3 TIMES DAILY PRN
Qty: 30 CAPSULE | Refills: 0 | Status: SHIPPED | OUTPATIENT
Start: 2024-02-20 | End: 2024-03-01

## 2024-02-20 RX ORDER — ONDANSETRON HYDROCHLORIDE 8 MG/1
8 TABLET, FILM COATED ORAL EVERY 8 HOURS PRN
Qty: 20 TABLET | Refills: 0 | Status: ON HOLD | OUTPATIENT
Start: 2024-02-20 | End: 2024-06-06

## 2024-02-20 NOTE — PATIENT INSTRUCTIONS
Please drink plenty of fluids.  Please get plenty of rest.  Please return here or go to the Emergency Department for any concerns or worsening of condition.  If you were prescribed antibiotics + antiviral + prednisone, please take them to completion.  Recommended using albuterol inhaler at least once today and then starting tomorrow use at needed. Use flonase nasal spray twice daily and take daily zyrtec as directed for ten days. Use tessalon perles as needed for cough. Recommended taking vitamin D and zinc supplements for immune support.     Take meclizine as needed for dizziness.  Take Zofran as needed for nausea and vomiting.  Take Imodium as needed for diarrhea.  Urgent neuro consult/ referral placed, follow up with Neurology for dizziness.  If you do not have Hypertension or any history of palpitations, it is ok to take over the counter Sudafed or Mucinex D or Allegra-D or Claritin-D or Zyrtec-D.  If you do take one of the above, it is ok to combine that with plain over the counter Mucinex or Allegra or Claritin or Zyrtec.  If for example you are taking Zyrtec -D, you can combine that with Mucinex, but not Mucinex-D.  If you are taking Mucinex-D, you can combine that with plain Allegra or Claritin or Zyrtec.   If you do have Hypertension or palpitations, it is safe to take Coricidin HBP for relief of sinus symptoms.  If not allergic, please take over the counter Tylenol (Acetaminophen) and/or Motrin (Ibuprofen) as directed for control of pain and/or fever.  Please follow up with your primary care doctor or specialist as needed.    If you  smoke, please stop smoking.

## 2024-02-20 NOTE — PROGRESS NOTES
Subjective:      Patient ID: Maicol Millan III is a 53 y.o. male.    Vitals:  height is 6' (1.829 m) and weight is 81.6 kg (179 lb 14.3 oz). His oral temperature is 97.3 °F (36.3 °C). His blood pressure is 104/74 and his pulse is 83. His respiration is 19 and oxygen saturation is 98%.     Chief Complaint: Sinus Problem and Dizziness    54 y/o male presents to clinic with chief complaint of cough, sore throat, rhinorrhea, nasal congestion, vomiting, night sweats, diarrhea, dizziness, off feeling off balance, shortness for breath.  Symptoms started 4 days ago. Patient has not taken any medication. Denies any recent ill exposures.  Denies any recent travel.  Denies history of seasonal allergies. Denies numbness or tingling. Denies radiation of pain. Denies fever, chills, body aches, chest pain, abdominal pain, nausea, diarrhea, or rashes.          Sinus Problem  This is a new problem. The current episode started in the past 7 days. The problem has been gradually worsening since onset. There has been no fever. His pain is at a severity of 7/10 (Lower Back). The pain is moderate. Associated symptoms include chills, congestion, coughing, diaphoresis, shortness of breath and a sore throat. Pertinent negatives include no ear pain, headaches, neck pain, sinus pressure or sneezing. Treatments tried: Unknown meds given from rehab.       Constitution: Positive for chills, sweating and fatigue. Negative for activity change, appetite change, fever and generalized weakness.   HENT:  Positive for congestion, postnasal drip and sore throat. Negative for ear pain, ear discharge, foreign body in ear, tinnitus, hearing loss, facial swelling, nosebleeds, foreign body in nose, sinus pain, sinus pressure, trouble swallowing and voice change.    Neck: Negative for neck pain, neck stiffness and neck swelling.   Cardiovascular:  Negative for chest pain, leg swelling, palpitations and sob on exertion.   Eyes:  Negative for eye discharge,  eye itching, eye pain and eye redness.   Respiratory:  Positive for cough and shortness of breath. Negative for chest tightness, sputum production, wheezing and asthma.    Gastrointestinal:  Positive for vomiting. Negative for abdominal pain, nausea, constipation and diarrhea.   Genitourinary:  Negative for dysuria, frequency, urgency, urine decreased, flank pain and hematuria.   Musculoskeletal:  Negative for pain, pain with walking and muscle ache.   Skin:  Negative for rash, erythema and bruising.   Allergic/Immunologic: Negative for environmental allergies, seasonal allergies, food allergies, asthma, chronic cough, sneezing and flu shot.   Neurological:  Positive for dizziness. Negative for light-headedness, passing out, coordination disturbances, loss of balance, headaches, disorientation and altered mental status.   Psychiatric/Behavioral:  Negative for altered mental status, disorientation, confusion, agitation and nervous/anxious. The patient is not nervous/anxious.       Objective:     Physical Exam   Constitutional: He is oriented to person, place, and time. He appears well-developed. He is cooperative.  Non-toxic appearance. He appears ill. No distress. He is not intubated.   HENT:   Head: Normocephalic and atraumatic.   Ears:   Right Ear: Hearing, external ear and ear canal normal. Tympanic membrane is bulging. A middle ear effusion is present.   Left Ear: Hearing, external ear and ear canal normal. A middle ear effusion is present.   Nose: Mucosal edema present. No rhinorrhea or nasal deformity. No epistaxis. Right sinus exhibits maxillary sinus tenderness. Right sinus exhibits no frontal sinus tenderness. Left sinus exhibits maxillary sinus tenderness. Left sinus exhibits no frontal sinus tenderness.   Mouth/Throat: Uvula is midline, oropharynx is clear and moist and mucous membranes are normal. No trismus in the jaw. Normal dentition. No uvula swelling. Cobblestoning present. No oropharyngeal exudate,  posterior oropharyngeal edema or posterior oropharyngeal erythema. Tonsils are 0 on the right. Tonsils are 0 on the left. No tonsillar exudate.   Eyes: Conjunctivae and lids are normal. No scleral icterus. Extraocular movement intact   Neck: Trachea normal and phonation normal. Neck supple. No edema present. No erythema present. No neck rigidity present.   Cardiovascular: Regular rhythm, normal heart sounds and normal pulses. Tachycardia present.   Pulmonary/Chest: Accessory muscle usage present. No stridor. Tachypnea (short of breath) noted. No apnea and no bradypnea. He is not intubated. No respiratory distress. He has no decreased breath sounds. He has wheezes in the right upper field, the right middle field, the right lower field, the left upper field and the left middle field. He has no rhonchi. He has no rales.   Abdominal: Normal appearance.   Musculoskeletal: Normal range of motion.         General: No deformity. Normal range of motion.   Lymphadenopathy:     He has cervical adenopathy.   Neurological: He is alert and oriented to person, place, and time. He exhibits normal muscle tone. Coordination normal.   Skin: Skin is warm, dry, intact, not diaphoretic and not pale. No erythema   Psychiatric: His speech is normal and behavior is normal. Judgment and thought content normal.   Nursing note and vitals reviewed.      Assessment:     1. COVID-19    2. Sinus problem    3. Wheezing    4. COPD with acute exacerbation    5. Nausea and vomiting, unspecified vomiting type    6. Dizziness    7. Diarrhea, unspecified type        Plan:     Results for orders placed or performed in visit on 02/20/24   SARS Coronavirus 2 Antigen, POCT Manual Read   Result Value Ref Range    SARS Coronavirus 2 Antigen Positive (A) Negative     Acceptable Yes          COVID-19  -     cetirizine (ZYRTEC) 10 MG tablet; Take 1 tablet (10 mg total) by mouth once daily.  Dispense: 30 tablet; Refill: 0  -     benzonatate  (TESSALON) 200 MG capsule; Take 1 capsule (200 mg total) by mouth 3 (three) times daily as needed for Cough.  Dispense: 30 capsule; Refill: 0  -     fluticasone propionate (FLONASE) 50 mcg/actuation nasal spray; 1 spray (50 mcg total) by Each Nostril route 2 (two) times daily. for 10 days  Dispense: 16 g; Refill: 0  -     molnupiravir 200 mg capsule (EUA); Take 4 capsules (800 mg total) by mouth every 12 (twelve) hours. for 5 days  Dispense: 40 capsule; Refill: 0    Sinus problem  -     SARS Coronavirus 2 Antigen, POCT Manual Read    Wheezing  -     predniSONE (DELTASONE) 20 MG tablet; Take 1 tablet (20 mg total) by mouth 2 (two) times daily. for 5 days  Dispense: 10 tablet; Refill: 0    COPD with acute exacerbation  -     predniSONE (DELTASONE) 20 MG tablet; Take 1 tablet (20 mg total) by mouth 2 (two) times daily. for 5 days  Dispense: 10 tablet; Refill: 0  -     doxycycline (VIBRAMYCIN) 100 MG Cap; Take 1 capsule (100 mg total) by mouth every 12 (twelve) hours. for 7 days  Dispense: 14 capsule; Refill: 0    Nausea and vomiting, unspecified vomiting type  -     ondansetron (ZOFRAN) 8 MG tablet; Take 1 tablet (8 mg total) by mouth every 8 (eight) hours as needed for Nausea.  Dispense: 20 tablet; Refill: 0    Dizziness  -     meclizine (ANTIVERT) 25 mg tablet; Take 1 tablet (25 mg total) by mouth 3 (three) times daily as needed for Dizziness.  Dispense: 20 tablet; Refill: 0  -     Ambulatory referral/consult to Neurology    Diarrhea, unspecified type  -     loperamide (IMODIUM) 2 mg capsule; Take 1 capsule (2 mg total) by mouth 4 (four) times daily as needed for Diarrhea.  Dispense: 20 capsule; Refill: 0      2: covid risk score.  We will treat symptomatically for viral etiology.  Patient is on day */5  of quarantine according to CDC. Patient can take OTC Acetaminophen (Tylenol) and/or Ibuprofen (Motrin) as needed for pain relief and/or fever relief. Continue to drink plenty of fluids. Follow up with PCP.      Patient Instructions   Please drink plenty of fluids.  Please get plenty of rest.  Please return here or go to the Emergency Department for any concerns or worsening of condition.  If you were prescribed antibiotics + antiviral + prednisone, please take them to completion.  Recommended using albuterol inhaler at least once today and then starting tomorrow use at needed. Use flonase nasal spray twice daily and take daily zyrtec as directed for ten days. Use tessalon perles as needed for cough. Recommended taking vitamin D and zinc supplements for immune support.     Take meclizine as needed for dizziness.  Take Zofran as needed for nausea and vomiting.  Take Imodium as needed for diarrhea.  Urgent neuro consult/ referral placed, follow up with Neurology for dizziness.  If you do not have Hypertension or any history of palpitations, it is ok to take over the counter Sudafed or Mucinex D or Allegra-D or Claritin-D or Zyrtec-D.  If you do take one of the above, it is ok to combine that with plain over the counter Mucinex or Allegra or Claritin or Zyrtec.  If for example you are taking Zyrtec -D, you can combine that with Mucinex, but not Mucinex-D.  If you are taking Mucinex-D, you can combine that with plain Allegra or Claritin or Zyrtec.   If you do have Hypertension or palpitations, it is safe to take Coricidin HBP for relief of sinus symptoms.  If not allergic, please take over the counter Tylenol (Acetaminophen) and/or Motrin (Ibuprofen) as directed for control of pain and/or fever.  Please follow up with your primary care doctor or specialist as needed.    If you  smoke, please stop smoking.

## 2024-03-05 ENCOUNTER — TELEPHONE (OUTPATIENT)
Dept: PSYCHIATRY | Facility: CLINIC | Age: 54
End: 2024-03-05
Payer: MEDICAID

## 2024-03-05 NOTE — TELEPHONE ENCOUNTER
Attempted to contact patient's wife in regards to scheduling. No answer, left a voicemail to return call to clinic

## 2024-03-05 NOTE — TELEPHONE ENCOUNTER
----- Message from Sahdia Del Rosario sent at 3/5/2024  8:22 AM CST -----  Contact: Spouse, Preethi Millan III  MRN: [id number]  : 1970  PCP: Arline, Primary Doctor  Home Phone      [phone]  Work Phone      Not on file.  Mobile          [phone]  Home Phone      Not on file.      MESSAGE: Patient would like a returned call to schedule a follow up appointment    PHONE; [phone]

## 2024-03-14 ENCOUNTER — HOSPITAL ENCOUNTER (EMERGENCY)
Facility: HOSPITAL | Age: 54
Discharge: ANOTHER HOSPITAL | End: 2024-03-14
Attending: EMERGENCY MEDICINE
Payer: MEDICAID

## 2024-03-14 ENCOUNTER — HOSPITAL ENCOUNTER (INPATIENT)
Facility: HOSPITAL | Age: 54
LOS: 3 days | Discharge: LEFT BEFORE DISCHARGE | DRG: 894 | End: 2024-03-17
Attending: STUDENT IN AN ORGANIZED HEALTH CARE EDUCATION/TRAINING PROGRAM | Admitting: STUDENT IN AN ORGANIZED HEALTH CARE EDUCATION/TRAINING PROGRAM
Payer: MEDICAID

## 2024-03-14 VITALS
TEMPERATURE: 98 F | HEART RATE: 77 BPM | SYSTOLIC BLOOD PRESSURE: 116 MMHG | RESPIRATION RATE: 20 BRPM | WEIGHT: 190.94 LBS | HEIGHT: 72 IN | DIASTOLIC BLOOD PRESSURE: 59 MMHG | BODY MASS INDEX: 25.86 KG/M2 | OXYGEN SATURATION: 95 %

## 2024-03-14 DIAGNOSIS — E51.2 WERNICKE ENCEPHALOPATHY: ICD-10-CM

## 2024-03-14 DIAGNOSIS — R55 SYNCOPE AND COLLAPSE: ICD-10-CM

## 2024-03-14 DIAGNOSIS — R07.9 CHEST PAIN: ICD-10-CM

## 2024-03-14 DIAGNOSIS — R53.1 WEAKNESS: ICD-10-CM

## 2024-03-14 DIAGNOSIS — R90.89 ABNORMAL BRAIN MRI: ICD-10-CM

## 2024-03-14 DIAGNOSIS — F10.930 ALCOHOL WITHDRAWAL SYNDROME WITHOUT COMPLICATION: Primary | ICD-10-CM

## 2024-03-14 PROBLEM — F10.939 ALCOHOL WITHDRAWAL: Status: ACTIVE | Noted: 2023-12-17

## 2024-03-14 LAB
ALBUMIN SERPL BCP-MCNC: 3.6 G/DL (ref 3.5–5.2)
ALP SERPL-CCNC: 98 U/L (ref 55–135)
ALT SERPL W/O P-5'-P-CCNC: 14 U/L (ref 10–44)
AMPHET+METHAMPHET UR QL: NEGATIVE
ANION GAP SERPL CALC-SCNC: 11 MMOL/L (ref 8–16)
APTT PPP: 24.6 SEC (ref 21–32)
AST SERPL-CCNC: 24 U/L (ref 10–40)
BARBITURATES UR QL SCN>200 NG/ML: NEGATIVE
BASOPHILS # BLD AUTO: 0.03 K/UL (ref 0–0.2)
BASOPHILS NFR BLD: 0.5 % (ref 0–1.9)
BENZODIAZ UR QL SCN>200 NG/ML: ABNORMAL
BILIRUB SERPL-MCNC: 1.5 MG/DL (ref 0.1–1)
BUN SERPL-MCNC: 15 MG/DL (ref 6–20)
BZE UR QL SCN: NEGATIVE
CALCIUM SERPL-MCNC: 8.6 MG/DL (ref 8.7–10.5)
CANNABINOIDS UR QL SCN: NEGATIVE
CHLORIDE SERPL-SCNC: 104 MMOL/L (ref 95–110)
CO2 SERPL-SCNC: 26 MMOL/L (ref 23–29)
CREAT SERPL-MCNC: 0.7 MG/DL (ref 0.5–1.4)
CREAT UR-MCNC: 112 MG/DL (ref 23–375)
CRP SERPL-MCNC: 5.4 MG/L (ref 0–8.2)
DIFFERENTIAL METHOD BLD: ABNORMAL
EOSINOPHIL # BLD AUTO: 0 K/UL (ref 0–0.5)
EOSINOPHIL NFR BLD: 0.2 % (ref 0–8)
ERYTHROCYTE [DISTWIDTH] IN BLOOD BY AUTOMATED COUNT: 15 % (ref 11.5–14.5)
ERYTHROCYTE [SEDIMENTATION RATE] IN BLOOD BY WESTERGREN METHOD: 10 MM/HR (ref 0–10)
EST. GFR  (NO RACE VARIABLE): >60 ML/MIN/1.73 M^2
ETHANOL SERPL-MCNC: <10 MG/DL
GGT SERPL-CCNC: 115 U/L (ref 8–55)
GLUCOSE SERPL-MCNC: 115 MG/DL (ref 70–110)
HCT VFR BLD AUTO: 34.5 % (ref 40–54)
HGB BLD-MCNC: 11.7 G/DL (ref 14–18)
IMM GRANULOCYTES # BLD AUTO: 0.01 K/UL (ref 0–0.04)
IMM GRANULOCYTES NFR BLD AUTO: 0.2 % (ref 0–0.5)
INR PPP: 1 (ref 0.8–1.2)
LYMPHOCYTES # BLD AUTO: 0.6 K/UL (ref 1–4.8)
LYMPHOCYTES NFR BLD: 11.3 % (ref 18–48)
MCH RBC QN AUTO: 31.5 PG (ref 27–31)
MCHC RBC AUTO-ENTMCNC: 33.9 G/DL (ref 32–36)
MCV RBC AUTO: 93 FL (ref 82–98)
METHADONE UR QL SCN>300 NG/ML: NEGATIVE
MONOCYTES # BLD AUTO: 0.3 K/UL (ref 0.3–1)
MONOCYTES NFR BLD: 5.6 % (ref 4–15)
NEUTROPHILS # BLD AUTO: 4.6 K/UL (ref 1.8–7.7)
NEUTROPHILS NFR BLD: 82.2 % (ref 38–73)
NRBC BLD-RTO: 0 /100 WBC
OPIATES UR QL SCN: NEGATIVE
PCP UR QL SCN>25 NG/ML: NEGATIVE
PLATELET # BLD AUTO: 91 K/UL (ref 150–450)
PMV BLD AUTO: 10.6 FL (ref 9.2–12.9)
POTASSIUM SERPL-SCNC: 3.7 MMOL/L (ref 3.5–5.1)
PROT SERPL-MCNC: 6.7 G/DL (ref 6–8.4)
PROTHROMBIN TIME: 10.2 SEC (ref 9–12.5)
RBC # BLD AUTO: 3.71 M/UL (ref 4.6–6.2)
SARS-COV-2 RDRP RESP QL NAA+PROBE: NEGATIVE
SODIUM SERPL-SCNC: 141 MMOL/L (ref 136–145)
TOXICOLOGY INFORMATION: ABNORMAL
WBC # BLD AUTO: 5.57 K/UL (ref 3.9–12.7)

## 2024-03-14 PROCEDURE — 21400001 HC TELEMETRY ROOM

## 2024-03-14 PROCEDURE — 63600175 PHARM REV CODE 636 W HCPCS: Performed by: EMERGENCY MEDICINE

## 2024-03-14 PROCEDURE — 96372 THER/PROPH/DIAG INJ SC/IM: CPT | Mod: 59 | Performed by: EMERGENCY MEDICINE

## 2024-03-14 PROCEDURE — 96375 TX/PRO/DX INJ NEW DRUG ADDON: CPT

## 2024-03-14 PROCEDURE — 86140 C-REACTIVE PROTEIN: CPT | Performed by: EMERGENCY MEDICINE

## 2024-03-14 PROCEDURE — 25000003 PHARM REV CODE 250: Performed by: EMERGENCY MEDICINE

## 2024-03-14 PROCEDURE — 99285 EMERGENCY DEPT VISIT HI MDM: CPT | Mod: 25

## 2024-03-14 PROCEDURE — 85025 COMPLETE CBC W/AUTO DIFF WBC: CPT | Performed by: EMERGENCY MEDICINE

## 2024-03-14 PROCEDURE — 85651 RBC SED RATE NONAUTOMATED: CPT | Performed by: EMERGENCY MEDICINE

## 2024-03-14 PROCEDURE — 82977 ASSAY OF GGT: CPT | Performed by: EMERGENCY MEDICINE

## 2024-03-14 PROCEDURE — 96366 THER/PROPH/DIAG IV INF ADDON: CPT

## 2024-03-14 PROCEDURE — 93005 ELECTROCARDIOGRAM TRACING: CPT

## 2024-03-14 PROCEDURE — U0002 COVID-19 LAB TEST NON-CDC: HCPCS | Performed by: EMERGENCY MEDICINE

## 2024-03-14 PROCEDURE — 80053 COMPREHEN METABOLIC PANEL: CPT | Performed by: EMERGENCY MEDICINE

## 2024-03-14 PROCEDURE — 85730 THROMBOPLASTIN TIME PARTIAL: CPT | Performed by: EMERGENCY MEDICINE

## 2024-03-14 PROCEDURE — 96361 HYDRATE IV INFUSION ADD-ON: CPT

## 2024-03-14 PROCEDURE — 80307 DRUG TEST PRSMV CHEM ANLYZR: CPT | Performed by: EMERGENCY MEDICINE

## 2024-03-14 PROCEDURE — 93010 ELECTROCARDIOGRAM REPORT: CPT | Mod: ,,, | Performed by: INTERNAL MEDICINE

## 2024-03-14 PROCEDURE — 85610 PROTHROMBIN TIME: CPT | Performed by: EMERGENCY MEDICINE

## 2024-03-14 PROCEDURE — 96365 THER/PROPH/DIAG IV INF INIT: CPT

## 2024-03-14 PROCEDURE — 82077 ASSAY SPEC XCP UR&BREATH IA: CPT | Performed by: EMERGENCY MEDICINE

## 2024-03-14 RX ORDER — ACETAMINOPHEN 325 MG/1
650 TABLET ORAL EVERY 8 HOURS PRN
Status: DISCONTINUED | OUTPATIENT
Start: 2024-03-14 | End: 2024-03-17 | Stop reason: HOSPADM

## 2024-03-14 RX ORDER — LANOLIN ALCOHOL/MO/W.PET/CERES
800 CREAM (GRAM) TOPICAL
Status: DISCONTINUED | OUTPATIENT
Start: 2024-03-14 | End: 2024-03-17 | Stop reason: HOSPADM

## 2024-03-14 RX ORDER — METOCLOPRAMIDE HYDROCHLORIDE 5 MG/ML
5 INJECTION INTRAMUSCULAR; INTRAVENOUS
Status: COMPLETED | OUTPATIENT
Start: 2024-03-14 | End: 2024-03-14

## 2024-03-14 RX ORDER — SODIUM,POTASSIUM PHOSPHATES 280-250MG
2 POWDER IN PACKET (EA) ORAL
Status: DISCONTINUED | OUTPATIENT
Start: 2024-03-14 | End: 2024-03-17 | Stop reason: HOSPADM

## 2024-03-14 RX ORDER — ONDANSETRON HYDROCHLORIDE 2 MG/ML
4 INJECTION, SOLUTION INTRAVENOUS EVERY 8 HOURS PRN
Status: DISCONTINUED | OUTPATIENT
Start: 2024-03-14 | End: 2024-03-17 | Stop reason: HOSPADM

## 2024-03-14 RX ORDER — THIAMINE HYDROCHLORIDE 100 MG/ML
100 INJECTION, SOLUTION INTRAMUSCULAR; INTRAVENOUS
Status: DISCONTINUED | OUTPATIENT
Start: 2024-03-14 | End: 2024-03-14

## 2024-03-14 RX ORDER — GLUCAGON 1 MG
1 KIT INJECTION
Status: CANCELLED | OUTPATIENT
Start: 2024-03-14

## 2024-03-14 RX ORDER — IPRATROPIUM BROMIDE AND ALBUTEROL SULFATE 2.5; .5 MG/3ML; MG/3ML
3 SOLUTION RESPIRATORY (INHALATION) EVERY 4 HOURS PRN
Status: DISCONTINUED | OUTPATIENT
Start: 2024-03-14 | End: 2024-03-17 | Stop reason: HOSPADM

## 2024-03-14 RX ORDER — IBUPROFEN 200 MG
16 TABLET ORAL
Status: CANCELLED | OUTPATIENT
Start: 2024-03-14

## 2024-03-14 RX ORDER — LORAZEPAM 2 MG/ML
2 INJECTION INTRAMUSCULAR
Status: DISCONTINUED | OUTPATIENT
Start: 2024-03-14 | End: 2024-03-17 | Stop reason: HOSPADM

## 2024-03-14 RX ORDER — BISACODYL 10 MG/1
10 SUPPOSITORY RECTAL DAILY PRN
Status: DISCONTINUED | OUTPATIENT
Start: 2024-03-14 | End: 2024-03-17 | Stop reason: HOSPADM

## 2024-03-14 RX ORDER — ONDANSETRON HYDROCHLORIDE 2 MG/ML
4 INJECTION, SOLUTION INTRAVENOUS EVERY 6 HOURS PRN
Status: DISCONTINUED | OUTPATIENT
Start: 2024-03-14 | End: 2024-03-14 | Stop reason: HOSPADM

## 2024-03-14 RX ORDER — IBUPROFEN 200 MG
16 TABLET ORAL
Status: DISCONTINUED | OUTPATIENT
Start: 2024-03-14 | End: 2024-03-17 | Stop reason: HOSPADM

## 2024-03-14 RX ORDER — CHLORDIAZEPOXIDE HYDROCHLORIDE 25 MG/1
25 CAPSULE, GELATIN COATED ORAL 3 TIMES DAILY
Status: DISCONTINUED | OUTPATIENT
Start: 2024-03-14 | End: 2024-03-14 | Stop reason: HOSPADM

## 2024-03-14 RX ORDER — NALOXONE HCL 0.4 MG/ML
0.02 VIAL (ML) INJECTION
Status: DISCONTINUED | OUTPATIENT
Start: 2024-03-14 | End: 2024-03-17 | Stop reason: HOSPADM

## 2024-03-14 RX ORDER — DIAZEPAM 5 MG/1
10 TABLET ORAL
Status: COMPLETED | OUTPATIENT
Start: 2024-03-14 | End: 2024-03-14

## 2024-03-14 RX ORDER — LORAZEPAM 2 MG/ML
1 INJECTION INTRAMUSCULAR
Status: COMPLETED | OUTPATIENT
Start: 2024-03-14 | End: 2024-03-14

## 2024-03-14 RX ORDER — IBUPROFEN 200 MG
24 TABLET ORAL
Status: DISCONTINUED | OUTPATIENT
Start: 2024-03-14 | End: 2024-03-17 | Stop reason: HOSPADM

## 2024-03-14 RX ORDER — SODIUM CHLORIDE 0.9 % (FLUSH) 0.9 %
10 SYRINGE (ML) INJECTION EVERY 12 HOURS PRN
Status: DISCONTINUED | OUTPATIENT
Start: 2024-03-14 | End: 2024-03-17 | Stop reason: HOSPADM

## 2024-03-14 RX ORDER — GLUCAGON 1 MG
1 KIT INJECTION
Status: DISCONTINUED | OUTPATIENT
Start: 2024-03-14 | End: 2024-03-17 | Stop reason: HOSPADM

## 2024-03-14 RX ORDER — ONDANSETRON HYDROCHLORIDE 2 MG/ML
8 INJECTION, SOLUTION INTRAVENOUS
Status: COMPLETED | OUTPATIENT
Start: 2024-03-14 | End: 2024-03-14

## 2024-03-14 RX ORDER — ALUMINUM HYDROXIDE, MAGNESIUM HYDROXIDE, AND SIMETHICONE 1200; 120; 1200 MG/30ML; MG/30ML; MG/30ML
30 SUSPENSION ORAL 4 TIMES DAILY PRN
Status: DISCONTINUED | OUTPATIENT
Start: 2024-03-14 | End: 2024-03-17 | Stop reason: HOSPADM

## 2024-03-14 RX ORDER — SIMETHICONE 80 MG
1 TABLET,CHEWABLE ORAL 4 TIMES DAILY PRN
Status: DISCONTINUED | OUTPATIENT
Start: 2024-03-14 | End: 2024-03-17 | Stop reason: HOSPADM

## 2024-03-14 RX ORDER — CYANOCOBALAMIN 1000 UG/ML
1000 INJECTION, SOLUTION INTRAMUSCULAR; SUBCUTANEOUS ONCE
Status: COMPLETED | OUTPATIENT
Start: 2024-03-14 | End: 2024-03-14

## 2024-03-14 RX ORDER — SODIUM CHLORIDE 0.9 % (FLUSH) 0.9 %
10 SYRINGE (ML) INJECTION EVERY 12 HOURS PRN
Status: CANCELLED | OUTPATIENT
Start: 2024-03-14

## 2024-03-14 RX ORDER — IBUPROFEN 200 MG
24 TABLET ORAL
Status: CANCELLED | OUTPATIENT
Start: 2024-03-14

## 2024-03-14 RX ORDER — ACETAMINOPHEN 325 MG/1
650 TABLET ORAL EVERY 4 HOURS PRN
Status: DISCONTINUED | OUTPATIENT
Start: 2024-03-14 | End: 2024-03-17 | Stop reason: HOSPADM

## 2024-03-14 RX ORDER — NALOXONE HCL 0.4 MG/ML
0.02 VIAL (ML) INJECTION
Status: CANCELLED | OUTPATIENT
Start: 2024-03-14

## 2024-03-14 RX ORDER — PROCHLORPERAZINE EDISYLATE 5 MG/ML
5 INJECTION INTRAMUSCULAR; INTRAVENOUS EVERY 6 HOURS PRN
Status: DISCONTINUED | OUTPATIENT
Start: 2024-03-14 | End: 2024-03-17 | Stop reason: HOSPADM

## 2024-03-14 RX ORDER — POLYETHYLENE GLYCOL 3350 17 G/17G
17 POWDER, FOR SOLUTION ORAL DAILY
Status: DISCONTINUED | OUTPATIENT
Start: 2024-03-15 | End: 2024-03-17 | Stop reason: HOSPADM

## 2024-03-14 RX ORDER — TALC
6 POWDER (GRAM) TOPICAL NIGHTLY PRN
Status: DISCONTINUED | OUTPATIENT
Start: 2024-03-14 | End: 2024-03-17 | Stop reason: HOSPADM

## 2024-03-14 RX ADMIN — ONDANSETRON 8 MG: 2 INJECTION INTRAMUSCULAR; INTRAVENOUS at 05:03

## 2024-03-14 RX ADMIN — SODIUM CHLORIDE 1000 ML: 0.9 SOLUTION INTRAVENOUS at 08:03

## 2024-03-14 RX ADMIN — LORAZEPAM 1 MG: 2 INJECTION INTRAMUSCULAR; INTRAVENOUS at 02:03

## 2024-03-14 RX ADMIN — SODIUM CHLORIDE 1000 ML: 9 INJECTION, SOLUTION INTRAVENOUS at 04:03

## 2024-03-14 RX ADMIN — DIAZEPAM 10 MG: 5 TABLET ORAL at 02:03

## 2024-03-14 RX ADMIN — CYANOCOBALAMIN 1000 MCG: 1000 INJECTION, SOLUTION INTRAMUSCULAR; SUBCUTANEOUS at 02:03

## 2024-03-14 RX ADMIN — THIAMINE HYDROCHLORIDE 500 MG: 100 INJECTION, SOLUTION INTRAMUSCULAR; INTRAVENOUS at 06:03

## 2024-03-14 RX ADMIN — CHLORDIAZEPOXIDE HYDROCHLORIDE 25 MG: 25 CAPSULE ORAL at 08:03

## 2024-03-14 RX ADMIN — THIAMINE HYDROCHLORIDE 500 MG: 100 INJECTION, SOLUTION INTRAMUSCULAR; INTRAVENOUS at 03:03

## 2024-03-14 RX ADMIN — METOCLOPRAMIDE 5 MG: 5 INJECTION, SOLUTION INTRAMUSCULAR; INTRAVENOUS at 08:03

## 2024-03-14 NOTE — ED TRIAGE NOTES
To ED per [facility] with c/o nausea, vomiting, shaking, dizziness, and weakness. C/o abdominal pain from epigastric area to umbilicus. Patient reports fell x 3 today. Patient reports that he usually drinks a gallon of Vodka daily, but hasn't had a drink since yesterday afternoon around 6 pm. Patient received Zofran IV and NS in route per EMS.

## 2024-03-14 NOTE — ED PROVIDER NOTES
Encounter Date: 3/14/2024       History     Chief Complaint   Patient presents with    Weakness    Shaking    Nausea    Vomiting     HPI    Patient is a 53y WM hx COPD, alcoholism   Brought in by EMS with weakness.  He states today at 3:00 a.m. he tried to get out of the bed and could not stand.  His wife had to help him walk to the restroom.  He normally drinks 1 gal of alcohol a day.  His last gal was 4 days ago.  Three days ago he had a pt. Two days ago he had another pt.  Yesterday he did not have much alcohol at all.  He complains of feeling shaky, tremulous having nausea vomiting and been being unable to stand and walk.  Prior to activation of EMS he fell attempting to walk.    Of note patient states he was diagnosed with COVID-19 2 weeks ago    Review of patient's allergies indicates:   Allergen Reactions    Diphenoxylate-atropine Shortness Of Breath and Other (See Comments)     Esophagus tightens     Past Medical History:   Diagnosis Date    Addiction to drug     Alcohol abuse     last drink 9/06/22    Anxiety     Bipolar disorder     COPD (chronic obstructive pulmonary disease)     Depression     Disc disorder     Fatigue     Headache     History of psychiatric hospitalization     Hx of psychiatric care     Panic disorder     Psychiatric problem     PTSD (post-traumatic stress disorder)     Sleep difficulties     Suicide attempt     Therapy     Withdrawal symptoms, alcohol      Past Surgical History:   Procedure Laterality Date    APPENDECTOMY      COLONOSCOPY      polyps    HERNIA REPAIR      x 2     Family History   Problem Relation Age of Onset    Anxiety disorder Mother     Depression Mother     Heart block Father     No Known Problems Sister     No Known Problems Brother     Alcohol abuse Maternal Grandfather     Alcohol abuse Paternal Grandfather      Social History     Tobacco Use    Smoking status: Every Day     Current packs/day: 1.00     Average packs/day: 1 pack/day for 38.2 years (38.2 ttl  pk-yrs)     Types: Cigarettes     Start date: 1/8/1986    Smokeless tobacco: Never   Substance Use Topics    Alcohol use: Yes     Comment: daily drinker--alcohol abuse    Drug use: Not Currently     Types: Marijuana     Review of Systems   Constitutional:  Negative for chills and fatigue.   Cardiovascular:  Negative for chest pain.   Gastrointestinal:  Negative for abdominal pain.   Skin:  Negative for wound.   Neurological:  Positive for tremors and weakness. Negative for dizziness and syncope.   Psychiatric/Behavioral:  Positive for behavioral problems.    All other systems reviewed and are negative.    Social Determinants of Health     Tobacco Use: High Risk (3/14/2024)    Patient History     Smoking Tobacco Use: Every Day     Smokeless Tobacco Use: Never     Passive Exposure: Not on file   Alcohol Use: Heavy Drinker (3/15/2024)    AUDIT-C     Frequency of Alcohol Consumption: 4 or more times a week     Average Number of Drinks: 7 to 9     Frequency of Binge Drinking: Daily or almost daily   Financial Resource Strain: High Risk (3/15/2024)    Overall Financial Resource Strain (CARDIA)     Difficulty of Paying Living Expenses: Hard   Food Insecurity: Food Insecurity Present (3/15/2024)    Hunger Vital Sign     Worried About Running Out of Food in the Last Year: Sometimes true     Ran Out of Food in the Last Year: Sometimes true   Transportation Needs: No Transportation Needs (3/15/2024)    PRAPARE - Transportation     Lack of Transportation (Medical): No     Lack of Transportation (Non-Medical): No   Physical Activity: Inactive (3/15/2024)    Exercise Vital Sign     Days of Exercise per Week: 0 days     Minutes of Exercise per Session: 0 min   Stress: Patient Unable To Answer (3/15/2024)    [employer] [place] [employer] - Occupational Stress Questionnaire     Feeling of Stress : Patient unable to answer   Social Connections: Moderately Isolated (3/15/2024)    Social Connection and Isolation Panel  [NHANES]     Frequency of Communication with Friends and Family: Three times a week     Frequency of Social Gatherings with Friends and Family: Three times a week     Attends [employer] Services: Never     Active Member of Clubs or Organizations: No     Attends Club or Organization Meetings: Never     Marital Status: Living with partner   Housing Stability: Low Risk  (3/15/2024)    Housing Stability Vital Sign     Unable to Pay for Housing in the Last Year: No     Number of Places Lived in the Last Year: 1     Unstable Housing in the Last Year: No   Depression: Low Risk  (11/22/2023)    Depression     Last PHQ-4: Flowsheet Data: 2   Recent Concern: Depression - High Risk (9/20/2023)    Depression     Last PHQ-4: Flowsheet Data: 14       Physical Exam     Initial Vitals [03/14/24 1336]   BP Pulse Resp Temp SpO2   133/82 78 (!) 22 97.9 °F (36.6 °C) 95 %      MAP       --         Physical Exam    Nursing note and vitals reviewed.  Constitutional: He appears well-developed and well-nourished.   Resting tremors of hands   HENT:   Head: Normocephalic and atraumatic.   Nose: Nose normal.   Mouth/Throat: Oropharynx is clear and moist.   Eyes: EOM are normal. Pupils are equal, round, and reactive to light.   Neck: No JVD present.   Normal range of motion.  Cardiovascular:  Normal rate and intact distal pulses.           Pulmonary/Chest: Breath sounds normal. No stridor.   Abdominal: Abdomen is soft. There is no abdominal tenderness.   Musculoskeletal:      Cervical back: Normal range of motion.     Neurological: He is alert and oriented to person, place, and time. GCS score is 15. GCS eye subscore is 4. GCS verbal subscore is 5. GCS motor subscore is 6.   Unable to stand without assistance  Diffuse resting tremor of hands   Skin: Skin is warm. Capillary refill takes less than 2 seconds.         ED Course   Procedures  Labs Reviewed   CBC W/ AUTO DIFFERENTIAL - Abnormal; Notable for the following components:       Result Value     RBC 3.71 (*)     Hemoglobin 11.7 (*)     Hematocrit 34.5 (*)     MCH 31.5 (*)     RDW 15.0 (*)     Platelets 91 (*)     Lymph # 0.6 (*)     Gran % 82.2 (*)     Lymph % 11.3 (*)     All other components within normal limits   COMPREHENSIVE METABOLIC PANEL - Abnormal; Notable for the following components:    Glucose 115 (*)     Calcium 8.6 (*)     Total Bilirubin 1.5 (*)     All other components within normal limits   GAMMA GT - Abnormal; Notable for the following components:     (*)     All other components within normal limits   DRUG SCREEN PANEL, URINE EMERGENCY - Abnormal; Notable for the following components:    Benzodiazepines Presumptive Positive (*)     All other components within normal limits    Narrative:     Specimen Source->Urine   C-REACTIVE PROTEIN   PROTIME-INR   APTT   SARS-COV-2 RNA AMPLIFICATION, QUAL   ALCOHOL,MEDICAL (ETHANOL)   SEDIMENTATION RATE        ECG Results              EKG 12-lead (Final result)        Collection Time Result Time QRS Duration OHS QTC Calculation    03/14/24 15:10:53 03/18/24 12:02:30 90 492                     Final result by Interface, Lab In [facility] (03/18/24 12:02:41)                   Narrative:    Test Reason : R53.1    Vent. Rate : 078 BPM     Atrial Rate : 078 BPM     P-R Int : 110 ms          QRS Dur : 090 ms      QT Int : 432 ms       P-R-T Axes : 046 073 080 degrees     QTc Int : 492 ms    Sinus rhythm with short KS  Prolonged QT  Abnormal ECG  When compared with ECG of 16-DEC-2023 15:23,  The axis Shifted left  T wave inversion no longer evident in Inferior leads  T wave inversion no longer evident in Lateral leads  Confirmed by Robi VALVERDE, Brian FUNG ([id number]) on 3/18/2024 12:02:29 PM    Referred By: [email or web address]   SELF           Confirmed By:Brian Rosenbaum MD                      Wet Read by Carmenza Moya MD (03/14/24 16:21:46, [facility], Emergency Medicine)    NSR, HR 78, short KS, normal axis, QTc 492, no STEMI                                   Imaging Results              MRI Brain Without Contrast (Final result)  Result time 03/14/24 14:51:50      Final result by Leroy Koo Jr., MD (03/14/24 14:51:50)                   Impression:      Minor deep white matter ischemic changes noted in the left cerebrum otherwise negative MRI of the brain.      Electronically signed by: Leroy Koo MD  Date:    03/14/2024  Time:    14:51               Narrative:    EXAMINATION:  MRI BRAIN WITHOUT CONTRAST    CLINICAL HISTORY:  2 weeks post COVID infection, cannot walk;    TECHNIQUE:  Multiplanar multisequence MR imaging of the brain was performed without contrast.    COMPARISON:  CT head of March 14, 2024.    FINDINGS:  The general brain anatomy appears within normal limits with normal medulla, johnnie, brainstem, basal ganglion, corpus callosum, cerebral and cerebellar lobar structure.  The pituitary is not enlarged.  The internal auditory canals are sharp and symmetric without CP angle masses.  The basal cisterns are clear and symmetric.    There is no mass effect or midline shift.  The ventricles are within normal limits without asymmetry.  The gray-white matter differentiation is normal.  Within the brain parenchyma no hyper or hypodense lesions consistent with tumor, edema, CVA or hemorrhage is seen.  Few small foci of elevated signal is seen in the deep white matter of the left hemisphere on FLAIR weighting consistent with minor deep white matter ischemic changes.  No acute lesions are noted on diffusion weighting.                                       CT Head Without Contrast (Final result)  Result time 03/14/24 14:26:06      Final result by Shadia Damico MD (03/14/24 14:26:06)                   Impression:      No acute abnormality.      Electronically signed by: Shadia Damico MD  Date:    03/14/2024  Time:    14:26               Narrative:    EXAMINATION:  CT HEAD WITHOUT CONTRAST    CLINICAL HISTORY:  weakness, cannot  walk;    TECHNIQUE:  Low dose axial CT images obtained throughout the head without intravenous contrast. Sagittal and coronal reconstructions were performed.    COMPARISON:  12/16/2023    FINDINGS:  Intracranial compartment:    Ventricles and sulci are normal in size for age without evidence of hydrocephalus. No extra-axial blood or fluid collections.    The brain parenchyma appears normal. No parenchymal mass, hemorrhage, edema or major vascular distribution infarct.    Skull/extracranial contents (limited evaluation): No fracture. Mastoid air cells and paranasal sinuses are essentially clear.                                       Medications   LORazepam injection 1 mg (1 mg Intravenous Given 3/14/24 1408)   cyanocobalamin injection 1,000 mcg (1,000 mcg Intramuscular Given 3/14/24 1450)   thiamine (B-1) 500 mg in dextrose 5 % (D5W) 100 mL IVPB (0 mg Intravenous Stopped 3/14/24 1538)   diazePAM tablet 10 mg (10 mg Oral Given 3/14/24 1449)   sodium chloride 0.9% bolus 1,000 mL 1,000 mL (0 mLs Intravenous Stopped 3/14/24 1706)   thiamine (B-1) 500 mg in dextrose 5 % (D5W) 100 mL IVPB (0 mg Intravenous Stopped 3/14/24 1835)   ondansetron injection 8 mg (8 mg Intravenous Given 3/14/24 1718)   sodium chloride 0.9% bolus 1,000 mL 1,000 mL (1,000 mLs Intravenous New Bag 3/14/24 2000)   metoclopramide injection 5 mg (5 mg Intravenous Given 3/14/24 2004)     Medical Decision Making  This is an emergent evaluation of a 53y WM hx COPD, alcoholism presenting with weakness.  Exam he is non toxic, afebrile, normal heart rate with resting tremors of hands and no focal neurological deficits.  Labs remarkable for  no leukocytosis, H and H 11.7 and 34.5 with platelets 91, calcium 8.6 and .  His COVID-19 and ethanol are both negative today.  His INR is 1.0.  CT head is negative.  MRI shows minor deep white matter ischemic changes in the left cerebrum.  Patient has been prophylactically treated for withdrawal with p.o. Valium  and IV Ativan.  At no point throughout his stay did he not become tachycardic.    He has been given 500 mg IV thiamine along with 1000 mcg of B12.    Lower suspicion for current acute withdrawal as patient has not been tachycardic, no hallucinations, no gross agitation.   DDX: alcohol withdrawal, thiamine deficiency, subacute degeneration of the spinal cord 2/2 B12 deficiency, post COVID transverse myelitis    Patient had one episode of vomiting after being escorted to restroom by wife.  Zofran given.   Pending acceptance at facility with neurology services.         Amount and/or Complexity of Data Reviewed  Labs: ordered. Decision-making details documented in ED Course.  Radiology: ordered.    Risk  Prescription drug management.               ED Course as of 03/26/24 0604   u Mar 14, 2024   1621 Calcium(!): 8.6 [AP]   1623 GGT(!): 115 [AP]   1623 Hemoglobin(!): 11.7 [AP]   1623 Hematocrit(!): 34.5 [AP]   1919  Transfer [place] called informed [place] [facility] [place] awaiting acceptance from hospitalist Medicine [KK]   1956  [place] [facility] [place]Charit Spencer [KK]      ED Course User Index  [AP] Carmenza Moya MD  [KK] Isabella Sung MD                           Clinical Impression:  Final diagnoses:  [R53.1] Weakness          ED Disposition Condition    Transfer to Another Facility Stable                Carmenza Moya MD  03/14/24 1726       Carmenza Moya MD  03/26/24 0604

## 2024-03-15 PROBLEM — R90.89 ABNORMAL BRAIN MRI: Status: ACTIVE | Noted: 2024-03-15

## 2024-03-15 LAB
C DIFF GDH STL QL: NEGATIVE
C DIFF TOX A+B STL QL IA: NEGATIVE
WBC #/AREA STL HPF: NORMAL /[HPF]

## 2024-03-15 PROCEDURE — 87045 FECES CULTURE AEROBIC BACT: CPT | Performed by: STUDENT IN AN ORGANIZED HEALTH CARE EDUCATION/TRAINING PROGRAM

## 2024-03-15 PROCEDURE — 87046 STOOL CULTR AEROBIC BACT EA: CPT | Performed by: STUDENT IN AN ORGANIZED HEALTH CARE EDUCATION/TRAINING PROGRAM

## 2024-03-15 PROCEDURE — 25000003 PHARM REV CODE 250: Performed by: STUDENT IN AN ORGANIZED HEALTH CARE EDUCATION/TRAINING PROGRAM

## 2024-03-15 PROCEDURE — 89055 LEUKOCYTE ASSESSMENT FECAL: CPT | Performed by: STUDENT IN AN ORGANIZED HEALTH CARE EDUCATION/TRAINING PROGRAM

## 2024-03-15 PROCEDURE — 99223 1ST HOSP IP/OBS HIGH 75: CPT | Mod: ,,, | Performed by: INTERNAL MEDICINE

## 2024-03-15 PROCEDURE — 63600175 PHARM REV CODE 636 W HCPCS: Performed by: HOSPITALIST

## 2024-03-15 PROCEDURE — 21400001 HC TELEMETRY ROOM

## 2024-03-15 PROCEDURE — 27000207 HC ISOLATION

## 2024-03-15 PROCEDURE — 25000003 PHARM REV CODE 250: Performed by: HOSPITALIST

## 2024-03-15 PROCEDURE — 87209 SMEAR COMPLEX STAIN: CPT | Performed by: STUDENT IN AN ORGANIZED HEALTH CARE EDUCATION/TRAINING PROGRAM

## 2024-03-15 PROCEDURE — 25500020 PHARM REV CODE 255: Performed by: HOSPITALIST

## 2024-03-15 PROCEDURE — 87449 NOS EACH ORGANISM AG IA: CPT | Performed by: STUDENT IN AN ORGANIZED HEALTH CARE EDUCATION/TRAINING PROGRAM

## 2024-03-15 PROCEDURE — 87427 SHIGA-LIKE TOXIN AG IA: CPT | Performed by: STUDENT IN AN ORGANIZED HEALTH CARE EDUCATION/TRAINING PROGRAM

## 2024-03-15 RX ORDER — FOLIC ACID 1 MG/1
1 TABLET ORAL DAILY
Status: DISCONTINUED | OUTPATIENT
Start: 2024-03-15 | End: 2024-03-17 | Stop reason: HOSPADM

## 2024-03-15 RX ORDER — LANOLIN ALCOHOL/MO/W.PET/CERES
100 CREAM (GRAM) TOPICAL DAILY
Status: DISCONTINUED | OUTPATIENT
Start: 2024-03-15 | End: 2024-03-17 | Stop reason: HOSPADM

## 2024-03-15 RX ORDER — DIAZEPAM 5 MG/1
10 TABLET ORAL EVERY 6 HOURS
Status: DISCONTINUED | OUTPATIENT
Start: 2024-03-15 | End: 2024-03-17 | Stop reason: HOSPADM

## 2024-03-15 RX ORDER — DIAZEPAM 5 MG/1
5 TABLET ORAL EVERY 8 HOURS
Status: DISCONTINUED | OUTPATIENT
Start: 2024-03-15 | End: 2024-03-15

## 2024-03-15 RX ORDER — SODIUM CHLORIDE AND POTASSIUM CHLORIDE 150; 900 MG/100ML; MG/100ML
INJECTION, SOLUTION INTRAVENOUS CONTINUOUS
Status: DISCONTINUED | OUTPATIENT
Start: 2024-03-15 | End: 2024-03-16

## 2024-03-15 RX ADMIN — FOLIC ACID 1 MG: 1 TABLET ORAL at 09:03

## 2024-03-15 RX ADMIN — THIAMINE HCL TAB 100 MG 100 MG: 100 TAB at 09:03

## 2024-03-15 RX ADMIN — DIAZEPAM 5 MG: 5 TABLET ORAL at 01:03

## 2024-03-15 RX ADMIN — DIAZEPAM 5 MG: 5 TABLET ORAL at 05:03

## 2024-03-15 RX ADMIN — DIAZEPAM 10 MG: 5 TABLET ORAL at 11:03

## 2024-03-15 RX ADMIN — SODIUM CHLORIDE AND POTASSIUM CHLORIDE: 9; 1.49 INJECTION, SOLUTION INTRAVENOUS at 03:03

## 2024-03-15 RX ADMIN — DIAZEPAM 10 MG: 5 TABLET ORAL at 05:03

## 2024-03-15 RX ADMIN — IOHEXOL 80 ML: 350 INJECTION, SOLUTION INTRAVENOUS at 04:03

## 2024-03-15 NOTE — PLAN OF CARE
Problem: Adult Inpatient Plan of Care  Goal: Plan of Care Review  Outcome: Ongoing, Progressing  Goal: Patient-Specific Goal (Individualized)  Outcome: Ongoing, Progressing  Goal: Absence of Hospital-Acquired Illness or Injury  Outcome: Ongoing, Progressing  Goal: Optimal Comfort and Wellbeing  Outcome: Ongoing, Progressing  Goal: Readiness for Transition of Care  Outcome: Ongoing, Progressing     Problem: Violence Risk or Actual  Goal: Anger and Impulse Control  Outcome: Ongoing, Progressing     Problem: Infection  Goal: Absence of Infection Signs and Symptoms  Outcome: Ongoing, Progressing

## 2024-03-15 NOTE — SUBJECTIVE & OBJECTIVE
Past Medical History:   Diagnosis Date    Addiction to drug     Alcohol abuse     last drink 9/06/22    Anxiety     Bipolar disorder     COPD (chronic obstructive pulmonary disease)     Depression     Disc disorder     Fatigue     Headache     History of psychiatric hospitalization     Hx of psychiatric care     Panic disorder     Psychiatric problem     PTSD (post-traumatic stress disorder)     Sleep difficulties     Suicide attempt     Therapy     Withdrawal symptoms, alcohol        Past Surgical History:   Procedure Laterality Date    APPENDECTOMY      COLONOSCOPY      polyps    HERNIA REPAIR      x 2       Review of patient's allergies indicates:   Allergen Reactions    Diphenoxylate-atropine Shortness Of Breath and Other (See Comments)     Esophagus tightens       Current Facility-Administered Medications on File Prior to Encounter   Medication    [COMPLETED] cyanocobalamin injection 1,000 mcg    [COMPLETED] diazePAM tablet 10 mg    [COMPLETED] LORazepam injection 1 mg    [COMPLETED] metoclopramide injection 5 mg    [COMPLETED] ondansetron injection 8 mg    [COMPLETED] sodium chloride 0.9% bolus 1,000 mL 1,000 mL    [COMPLETED] sodium chloride 0.9% bolus 1,000 mL 1,000 mL    [COMPLETED] thiamine (B-1) 500 mg in dextrose 5 % (D5W) 100 mL IVPB    [COMPLETED] thiamine (B-1) 500 mg in dextrose 5 % (D5W) 100 mL IVPB    [DISCONTINUED] chlordiazepoxide capsule 25 mg    [DISCONTINUED] ondansetron injection 4 mg    [DISCONTINUED] thiamine injection 100 mg     Current Outpatient Medications on File Prior to Encounter   Medication Sig    albuterol (PROVENTIL/VENTOLIN HFA) 90 mcg/actuation inhaler Inhale 2 puffs into the lungs every 4 (four) hours as needed for Wheezing or Shortness of Breath.    ARIPiprazole (ABILIFY) 5 MG Tab Take 1 tablet (5 mg total) by mouth once daily.    cetirizine (ZYRTEC) 10 MG tablet Take 1 tablet (10 mg total) by mouth once daily.    EScitalopram oxalate (LEXAPRO) 20 MG tablet Take 1 tablet (20  mg total) by mouth once daily.    famotidine (PEPCID) 20 MG tablet Take 1 tablet (20 mg total) by mouth 2 (two) times daily.    fluticasone propionate (FLOVENT HFA) 110 mcg/actuation inhaler Inhale 2 puffs into the lungs 2 (two) times daily. Controller    hydrOXYzine pamoate (VISTARIL) 25 MG Cap Take 1 capsule (25 mg total) by mouth every 6 (six) hours as needed (anxiety).    LORazepam (ATIVAN) 0.5 MG tablet Take 1 tablet (0.5 mg total) by mouth 2 (two) times daily for 2 days, THEN 1 tablet (0.5 mg total) once daily for 2 days.    meclizine (ANTIVERT) 25 mg tablet Take 1 tablet (25 mg total) by mouth 3 (three) times daily as needed for Dizziness.    ondansetron (ZOFRAN) 8 MG tablet Take 1 tablet (8 mg total) by mouth every 8 (eight) hours as needed for Nausea.    rOPINIRole (REQUIP) 1 MG tablet Take 1 tablet (1 mg total) by mouth every evening.    thiamine 100 MG tablet Take 1 tablet (100 mg total) by mouth once daily.    tiotropium-olodateroL (STIOLTO RESPIMAT) 2.5-2.5 mcg/actuation Mist Inhale 2 puffs into the lungs once daily. Controller    traZODone (DESYREL) 100 MG tablet Take 2 tablets (200 mg total) by mouth every evening. (Patient not taking: Reported on 2/20/2024)     Family History       Problem Relation (Age of Onset)    Alcohol abuse Maternal Grandfather, Paternal Grandfather    Anxiety disorder Mother    Depression Mother    Heart block Father    No Known Problems Sister, Brother          Tobacco Use    Smoking status: Every Day     Current packs/day: 1.00     Average packs/day: 1 pack/day for 38.2 years (38.2 ttl pk-yrs)     Types: Cigarettes     Start date: 1/8/1986    Smokeless tobacco: Never   Substance and Sexual Activity    Alcohol use: Yes     Comment: daily drinker--alcohol abuse    Drug use: Not Currently     Types: Marijuana    Sexual activity: Yes     Birth control/protection: None     Review of Systems   Constitutional: Negative.    HENT: Negative.     Eyes: Negative.    Respiratory:  Negative.     Cardiovascular: Negative.    Gastrointestinal:  Positive for diarrhea and vomiting.   Endocrine: Negative.    Genitourinary: Negative.    Musculoskeletal: Negative.    Skin: Negative.    Allergic/Immunologic: Negative.    Neurological:  Positive for dizziness and tremors.        Gait instability    Psychiatric/Behavioral: Negative.       Objective:     Vital Signs (Most Recent):  Temp: 98 °F (36.7 °C) (03/14/24 2256)  Pulse: 78 (03/14/24 2311)  Resp: 20 (03/14/24 2256)  BP: (!) 142/76 (03/14/24 2256)  SpO2: 99 % (03/14/24 2256) Vital Signs (24h Range):  Temp:  [97.9 °F (36.6 °C)-98.2 °F (36.8 °C)] 98 °F (36.7 °C)  Pulse:  [76-91] 78  Resp:  [20-22] 20  SpO2:  [95 %-99 %] 99 %  BP: (116-144)/(59-82) 142/76     Weight: 84.8 kg (186 lb 15.2 oz)  Body mass index is 25.35 kg/m².     Physical Exam  Vitals and nursing note reviewed.   Constitutional:       General: He is not in acute distress.     Appearance: Normal appearance. He is not ill-appearing.   HENT:      Head: Normocephalic and atraumatic.      Nose: Nose normal.      Mouth/Throat:      Mouth: Mucous membranes are moist.   Eyes:      Extraocular Movements: Extraocular movements intact.   Cardiovascular:      Rate and Rhythm: Normal rate.      Pulses: Normal pulses.      Heart sounds: No murmur heard.  Pulmonary:      Effort: Pulmonary effort is normal. No respiratory distress.   Abdominal:      General: Abdomen is flat.      Palpations: Abdomen is soft.      Tenderness: There is no abdominal tenderness.   Musculoskeletal:      Right lower leg: No edema.      Left lower leg: No edema.   Skin:     General: Skin is warm.      Capillary Refill: Capillary refill takes less than 2 seconds.   Neurological:      Mental Status: He is alert.      Comments: Right facial numbness. Tremulous   Decreased effort with exam.   +Dizziness with ambulation/movement   Psychiatric:         Mood and Affect: Mood normal.                Significant Labs: All pertinent labs  within the past 24 hours have been reviewed.    Significant Imaging: I have reviewed all pertinent imaging results/findings within the past 24 hours.

## 2024-03-15 NOTE — CONSULTS
[facility] [place] - Telemetry  Neurology Consult Note    Patient Name: Maicol Millan III  Age: 53 y.o.  MRN: [id number]  Admission Date: 3/14/2024  Reason for consult: falls, MRI abnormality    CC:  fall, nausea    HPI:   Maicol Millan III is a 53 y.o. year old we will history of COPD, alcohol use, depression, anxiety presenting to the ER yesterday for severe dizziness, gait instability and nausea and vomiting.  History obtained from patient and chart review.    Patient states that he was doing well when he went to bed on Wednesday night.  He woke up at 3:00 a.m. yesterday morning to use the bathroom and he was unable to get off the bed and he did collapsed to the ground.  Did not hit head, no LOC. He went back into the bed and had been experiencing severe dizziness.  He states when he tried to get up again, he continued to collapsed to the ground.  He did mentioned that he had lightheadedness and feeling of warmth prior to collapsing to the ground.  He had also been experiencing severe nausea and vomiting and states he had at least 3-4 bouts of copious vomiting.  Also associated with multiple bouts of diarrhea.  He finally presented to the [facility] ED yesterday afternoon for evaluation.    In the ED, patient was hemodynamically stable.  Blood work and initial infectious workup without any impressive findings.  UDS positive for benzos.  CT head was obtained which was unremarkable.  MRI brain obtained for evaluation of Wernicke's encephalopathy which only showed small-vessel disease.  Patient was transferred to [facility] for management of alcohol withdrawal and for MRI findings.  Patient placed on CIWA protocol along with thiamine and B12 supplementation.     Per patient, he has had multiple episodes of GI symptoms similar to yesterday but this 1 was more severe.  He has history of alcohol dependence since age 5 and has undergone multiple admissions for rehab, de addiction.  He drinks almost a gal  everyday of [email or web address].  Wife states that he has been drinking every single day for the last 2-3 weeks.        Histories:  Allergies:  Diphenoxylate-atropine    Current Medications:    Current Facility-Administered Medications   Medication Dose Route Frequency Provider Last Rate Last Admin    0.9 % NaCl with KCl 20 mEq infusion   Intravenous Continuous Lincoln Calle MD   Held at 03/15/24 1115    acetaminophen tablet 650 mg  650 mg Oral Q8H PRN Ismael Madera MD        acetaminophen tablet 650 mg  650 mg Oral Q4H PRN Ismael Madera MD        albuterol-ipratropium 2.5 mg-0.5 mg/3 mL nebulizer solution 3 mL  3 mL Nebulization Q4H PRN Ismael Madera MD        aluminum-magnesium hydroxide-simethicone 200-200-20 mg/5 mL suspension 30 mL  30 mL Oral QID PRN Ismael Madera MD        bisacodyL suppository 10 mg  10 mg Rectal Daily PRN SalIsmael colby MD        dextrose 10% bolus 125 mL 125 mL  12.5 g Intravenous PRN Ismael Madera MD        dextrose 10% bolus 250 mL 250 mL  25 g Intravenous PRN SalIsmael colby MD        diazePAM tablet 10 mg  10 mg Oral Q6H Lincoln Calle MD   10 mg at 03/15/24 1100    folic acid tablet 1 mg  1 mg Oral Daily SalIsmael colby MD   1 mg at 03/15/24 0928    glucagon (human recombinant) injection 1 mg  1 mg Intramuscular PRN Ismael Madera MD        glucose chewable tablet 16 g  16 g Oral PRN Ismael Madera MD        glucose chewable tablet 24 g  24 g Oral PRN SalIsmael colby MD        LORazepam injection 2 mg  2 mg Intravenous Q2H PRN Ismael Madera MD        magnesium oxide tablet 800 mg  800 mg Oral PRN Ismael Madera MD        magnesium oxide tablet 800 mg  800 mg Oral PRN Ismael Madera MD        melatonin tablet 6 mg  6 mg Oral Nightly PRN Ismael Madera MD        naloxone 0.4 mg/mL injection 0.02 mg  0.02 mg Intravenous PRN Ismael Madera MD        ondansetron injection 4 mg  4 mg Intravenous Q8H PRN Ismael Madera MD        polyethylene glycol packet 17 g  17 g Oral Daily SalBuffy colbyar, MD         potassium bicarbonate disintegrating tablet 35 mEq  35 mEq Oral PRN Ismael Madera MD        potassium bicarbonate disintegrating tablet 50 mEq  50 mEq Oral PRN Ismael Madera MD        potassium bicarbonate disintegrating tablet 60 mEq  60 mEq Oral PRN Ismael Madera MD        potassium, sodium phosphates 280-160-250 mg packet 2 packet  2 packet Oral PRN Ismael Madera MD        potassium, sodium phosphates 280-160-250 mg packet 2 packet  2 packet Oral PRN Ismael Madera MD        potassium, sodium phosphates 280-160-250 mg packet 2 packet  2 packet Oral PRN Ismael Madera MD        prochlorperazine injection Soln 5 mg  5 mg Intravenous Q6H PRN Ismael Madera MD        simethicone chewable tablet 80 mg  1 tablet Oral QID PRN Ismael Madera MD        sodium chloride 0.9% flush 10 mL  10 mL Intravenous Q12H PRN Ismael Madera MD        thiamine tablet 100 mg  100 mg Oral Daily Ismael Madera MD   100 mg at 03/15/24 0928       Medical:   Past Medical History:   Diagnosis Date    Addiction to drug     Alcohol abuse     last drink 9/06/22    Anxiety     Bipolar disorder     COPD (chronic obstructive pulmonary disease)     Depression     Disc disorder     Fatigue     Headache     History of psychiatric hospitalization     Hx of psychiatric care     Panic disorder     Psychiatric problem     PTSD (post-traumatic stress disorder)     Sleep difficulties     Suicide attempt     Therapy     Withdrawal symptoms, alcohol         Surgeries:   Past Surgical History:   Procedure Laterality Date    APPENDECTOMY      COLONOSCOPY      polyps    HERNIA REPAIR      x 2        Family:   Family History   Problem Relation Age of Onset    Anxiety disorder Mother     Depression Mother     Heart block Father     No Known Problems Sister     No Known Problems Brother     Alcohol abuse Maternal Grandfather     Alcohol abuse Paternal Grandfather        Tobacco Use    Smoking status: Every Day     Current packs/day: 1.00     Average packs/day: 1 pack/day  for 38.2 years (38.2 ttl pk-yrs)     Types: Cigarettes     Start date: 1/8/1986    Smokeless tobacco: Never   Substance and Sexual Activity    Alcohol use: Yes     Comment: daily drinker--alcohol abuse    Drug use: Not Currently     Types: Marijuana    Sexual activity: Yes     Birth control/protection: None         Physical Exam:     Physical Examination  BP (!) 142/76 (BP Location: Right arm)   Pulse 110   Temp 98 °F (36.7 °C) (Oral)   Resp 20   Ht 6' (1.829 m)   Wt 83.9 kg (185 lb)   SpO2 99%   BMI 25.09 kg/m²   Body mass index is 25.09 kg/m².    Neurological Exam  Mental Status:   Alert and oriented to name, date, location, president.   Naming/Fluency/Comprehension/Repetition intact.   Recent/remote memory, registration, attention span/concentration, fund of knowledge intact by history.    CN:   II, III, IV, VI: PERRL, EOMI, no nystagmus, fundus not visualized due to inadequate dilation.V: intact to fine touch, temperature, pain.VII: symmetrical facial movement, nice smile, no drooping.VIII: grossly intact to hearing.IX, X: symmetrical palate, normal palatal elevation.XI: 5/5 SCM and 5/5 trapezius.XII: tongue midline, 5/5, no deviation or fasciculation.             Motor:    ShAb ElbEx ElbFle WrE WrFle Interos  HipFl KnExt KnFlex FtDors FtPlant   Left 5 5 5 5 5 5 5 5 5 5 5 5   Right 5 5 5 5 5 5 5 5 5 5 5 5   Tone: Normal tone in UE and LE, no atrophy, no fasciculation, no tremor no pronator drift.                Reflexes:    Bicep Tricep Brachioradial Patellar Achilles   Left 2+ 2+ 2+ 2+ 1+   Right 2+ 2+ 2+ 2+ 1+   No Clonus, down going toes b/l.    Sensation: on both UEs and LEs    Light Touch: Normal.    Coordination:    Tremor:  Present at rest as well as with outstretched hands.  No asterixis noted    Gait:    Blood tested    Significant Labs:   Recent Lab Results         03/15/24  0753   03/14/24  1704   03/14/24  1511   03/14/24  1503        Benzodiazepines   Presumptive Positive            Methadone metabolites   Negative           Phencyclidine   Negative           Albumin       3.6       Alcohol, Serum       <10       ALP       98       ALT       14       Amphetamines, Urine   Negative           Anion Gap       11       PTT       24.6  Comment: Refer to local heparin nomogram for intensity/dose specific   therapeutic   range.  LOT^050^APTT FSL^[id number]         AST       24       Barbituates, Urine   Negative           Baso #       0.03       Basophil %       0.5       BILIRUBIN TOTAL       1.5  Comment: For infants and newborns, interpretation of results should be based  on gestational age, weight and in agreement with clinical  observations.    Premature Infant recommended reference ranges:  Up to 24 hours.............<8.0 mg/dL  Up to 48 hours............<12.0 mg/dL  3-5 days..................<15.0 mg/dL  6-29 days.................<15.0 mg/dL         BUN       15       C difficile Toxins A+B, EIA Negative  Comment: Testing not recommended for children <24 months old.             C. diff Antigen Negative             Calcium       8.6       Chloride       104       CO2       26       Cocaine, Urine   Negative           Creatinine       0.7       Urine Creatinine   112.0           CRP       5.4       Differential Method       Automated       eGFR       >60       Eos #       0.0       Eos %       0.2       GGT       115       Glucose       115       Gran # (ANC)       4.6       Gran %       82.2       Hematocrit       34.5       Hemoglobin       11.7       Immature Grans (Abs)       0.01  Comment: Mild elevation in immature granulocytes is non specific and   can be seen in a variety of conditions including stress response,   acute inflammation, trauma and pregnancy. Correlation with other   laboratory and clinical findings is essential.         Immature Granulocytes       0.2       INR       1.0  Comment: Coumadin Therapy:  2.0 - 3.0 for INR for all indicators except mechanical heart valves  and  antiphospholipid syndromes which should use 2.5 - 3.5.  LOT^040^PT Inn^[id number]         Lymph #       0.6       Lymph %       11.3       MCH       31.5       MCHC       33.9       MCV       93       Mono #       0.3       Mono %       5.6       MPV       10.6       nRBC       0       Opiates, Urine   Negative           Platelet Count       91       Potassium       3.7       PROTEIN TOTAL       6.7       PT       10.2       RBC       3.71       RDW       15.0       SARS-CoV-2 RNA, Amplification, Qual     Negative  Comment: This test utilizes isothermal nucleic acid amplification technology   to   detect the SARS-CoV-2 RdRp nucleic acid segment. The analytical   sensitivity   (limit of detection) is 500 copies/swab.    A POSITIVE result is indicative of the presence of SARS-CoV-2 RNA;   clinical   correlation with patient history and other diagnostic information is   necessary to determine patient infection status.    A NEGATIVE result means that SARS-CoV-2 nucleic acids are not present   above   the limit of detection. A NEGATIVE result should be treated as   presumptive.   It does not rule out the possibility of COVID-19 and should not be   the sole   basis for treatment decisions.    If COVID-19 is strongly suspected based on clinical and exposure   history,   re-testing using an alternate molecular assay should be considered.    This test is Food and Drug Administration (FDA) approved. Performance   characteristics of this has been independently verified by [facility] Department of Pathology and Laboratory Medicine.           Sed Rate       10       Sodium       141       Marijuana (THC) Metabolite   Negative           Toxicology Information   SEE COMMENT  Comment: This screen includes the following classes of drugs at the listed   cut-off:    Benzodiazepines 200 ng/ml  Methadone 300 ng/ml  Cocaine metabolite 300 ng/ml  Opiates 300 ng/ml  Barbiturates 200 ng/ml  Amphetamines 1000 ng/ml  Marijuana  metabs (THC) 50 ng/ml  Phencyclidine (PCP) 25 ng/ml    This is a screening test. If results do not correlate with clinical   presentation, then a confirmatory send out test is advised.     This report is intended for use in clinical monitoring and management   of   patients. It is not intended for use in employment related drug   testing.             Stool WBC No neutrophils seen             WBC       5.57               Significant Imaging:   Images were personally reviewed and interpreted:   MRI brain without contrast:  Chronic microvascular disease noted.  No concerning features for Wernicke's encephalopathy.  Possible mild degree of age advanced generalized atrophy noted.    Vitamin B12 520 (Dec 2023)      Assessment and Plan:   53 y.o. year old we will history of COPD, alcohol use, depression, anxiety presenting to the ER yesterday for severe dizziness, gait instability and nausea and vomiting.  Symptoms concerning for significant presyncopal symptoms prior to collapse without LOC with significant abdominal pain with vomiting and diarrhea.  Patient does not seem to have any oculomotor dysfunction or encephalopathy with presentation, no ataxia noted either but however concerning for orthostatic hypotension.   MRI reviewed, no findings notable for Wernicke's encephalopathy and presentation inconsistent with the same.  Severe GI symptoms possibly related to alcohol related gastritis.     Plan:   - kindly obtain CTA head and neck  - send hemoglobin A1c and lipid panel  - no need for ASA at this time for stroke prevention.   - kindly obtain orthostatic vitals  - obtain vitamin B1 levels, to establish baseline although has been getting supplementation   - continue treating with supplementation of thiamine, folate  - continue [facility] protocol      Thank you for your consult. I will sign off. Please contact us if you have any additional questions.    Time spent on this encounter: 60 minutes. This includes face to face time  and non-face to face time preparing to see the patient (eg, review of tests), obtaining and/or reviewing separately obtained history, documenting clinical information in the electronic or other health record, independently interpreting results and communicating results to the patient/family/caregiver, or care coordinator.     Dillan Nam MD  Neurology  [facility]

## 2024-03-15 NOTE — ASSESSMENT & PLAN NOTE
- Patient reports drinking a gallon of vodka daily     Plan:   - [facility] protocol   - Educate patient on alcohol cessation   - Supplement thiamine, folate   - Follow up with PCP as outpatient

## 2024-03-15 NOTE — NURSING
[facility]  Nurses Note -- 4 Eyes      3/15/2024       Skin assessed on: Q Shift      [x] No Pressure Injuries Present    []Prevention Measures Documented    [] Yes LDA  for Pressure Injury Previously documented     [] Yes New Pressure Injury Discovered   [] LDA for New Pressure Injury Added      Attending:  Nikki Galindo LPN     Second RN:  Preethi Galindo RN

## 2024-03-15 NOTE — HPI
This is a 53-year-old male with a past medical history of COPD, alcohol use, depression, anxiety, who presents with gait instability.      Patient developed weakness/ gait instability around 3:00 a.m. on the morning of presentation.  He had difficulty with ambulation, fell to the floor and was unable to get up.  Since then, he had 2 more falls, and throughout the day he has been having persistent vomiting ( > 20 episodes reported ), and diarrhea ( 6-7 episodes).  He typically drinks about a gal of vodka a day and has been drinking since the age of 5.  He has been trying to detox, and his last alcoholic drink was 2 days prior.  He also smokes 1 pack of cigarettes daily.    In the ED, the patient was hemodynamically stable.  Labs were remarkable for normocytic anemia (11.7), thrombocytopenia (91-around baseline), elevated bilirubin (1.5).  UDS was positive for benzos.  CT head showed no acute abnormality.  MRI brain showed minor deep white matter ischemic changes noted in the left cerebrum.  Patient was given 2 L of NS, thiamine 500 mg IV x2, Zofran 8 mg IV, Reglan 5 mg IV, Ativan 1 mg IV, diazepam 10 mg IV, vitamin B12 1000 mcg IM, and Librium 25 mg p.o. x1.  He was admitted for further evaluation of possible Wernicke's encephalopathy based on MRI results.

## 2024-03-15 NOTE — PROGRESS NOTES
[facility]  Consult Note    SUMMARY     Recommendations    Continue Regular diet, monitoring PO intake of meals and snacks  Continue to monitor weights and labs  Collaboration with medical providers    Goals: 1. Pt to meet % EEN/EPN by RD follow up  Nutrition Goal Status: new  Communication of RD Recs:  (POC)    Assessment and Plan    Nutrition Problem  Inadequate oral intake    Related to (etiology):   Diagnosis related symptoms    Signs and Symptoms (as evidenced by):   Intake < needs    Interventions/Recommendations (treatment strategy):  Collaboration with medical providers    Nutrition Diagnosis Status:   New     Reason for Assessment    Reason For Assessment: identified at risk by screening criteria  Diagnosis:  (alcohol withdrawal)  Relevant Medical History:   Past Medical History:   Diagnosis Date    Addiction to drug     Alcohol abuse     last drink 9/06/22    Anxiety     Bipolar disorder     COPD (chronic obstructive pulmonary disease)     Depression     Disc disorder     Fatigue     Headache     History of psychiatric hospitalization     Hx of psychiatric care     Panic disorder     Psychiatric problem     PTSD (post-traumatic stress disorder)     Sleep difficulties     Suicide attempt     Therapy     Withdrawal symptoms, alcohol        Interdisciplinary Rounds: did not attend  General Information Comments: Pt identified as at risk per malnutrition screening tool. Pt currently on regular diet with no intake recorded in chart. Pt with no weight changes per chart review. BMI 25.09, overweight. LBM 3/14/24. NFPE to be performed at follow ups as warranted.  Nutrition Discharge Planning: regular diet    Nutrition Risk Screen    Nutrition Risk Screen: no indicators present    Nutrition/Diet History    Food Allergies: NKFA    Anthropometrics    Temp: 98 °F (36.7 °C)  Height Method: Stated  Height: 6' (182.9 cm)  Height (inches): 72 in  Weight Method: Bed Scale  Weight: 83.9 kg  (185 lb)  Weight (lb): 185 lb  Ideal Body Weight (IBW), Male: 178 lb  % Ideal Body Weight, Male (lb): 103.93 %  BMI (Calculated): 25.1  BMI Grade: 25 - 29.9 - overweight       Lab/Procedures/Meds    Pertinent Labs Reviewed: reviewed  BMP  Lab Results   Component Value Date     03/14/2024    K 3.7 03/14/2024     03/14/2024    CO2 26 03/14/2024    BUN 15 03/14/2024    CREATININE 0.7 03/14/2024    CALCIUM 8.6 (L) 03/14/2024    ANIONGAP 11 03/14/2024    EGFRNORACEVR >60 03/14/2024       Pertinent Medications Reviewed: reviewed  Scheduled Meds:   diazePAM  5 mg Oral Q8H    folic acid  1 mg Oral Daily    polyethylene glycol  17 g Oral Daily    thiamine  100 mg Oral Daily     Continuous Infusions:  PRN Meds:.acetaminophen, acetaminophen, albuterol-ipratropium, aluminum-magnesium hydroxide-simethicone, bisacodyL, dextrose 10%, dextrose 10%, glucagon (human recombinant), glucose, glucose, lorazepam, magnesium oxide, magnesium oxide, melatonin, naloxone, ondansetron, potassium bicarbonate, potassium bicarbonate, potassium bicarbonate, potassium, sodium phosphates, potassium, sodium phosphates, potassium, sodium phosphates, prochlorperazine, simethicone, sodium chloride 0.9%    Estimated/Assessed Needs    Weight Used For Calorie Calculations: 80.7 kg (178 lb)  Energy Calorie Requirements (kcal): 2184 kcal  Energy Need Method: [place]-St Jeor (x 1.3)  Protein Requirements: 84 g  Weight Used For Protein Calculations: 83.5 kg (184 lb)     Estimated Fluid Requirement Method: RDA Method  RDA Method (mL): 2184         Nutrition Prescription Ordered    Current Diet Order: regular diet    Evaluation of Received Nutrient/Fluid Intake    I/O: i/o  Energy Calories Required: not meeting needs  Protein Required: not meeting needs  Fluid Required: not meeting needs  Comments: lbm 3/14/24  % Intake of Estimated Energy Needs: 0 - 25 %  % Meal Intake: 0 - 25 %    Nutrition Risk    Level of Risk/Frequency of Follow-up: low        Monitor and Evaluation    Food and Nutrient Intake: food and beverage intake  Food and Nutrient Adminstration: diet order  Knowledge/Beliefs/Attitudes: food and nutrition knowledge/skill, beliefs and attitudes  Physical Activity and Function: nutrition-related ADLs and IADLs, factors affecting access to physical activity  Anthropometric Measurements: weight, weight change, body mass index  Biochemical Data, Medical Tests and Procedures: electrolyte and renal panel  Nutrition-Focused Physical Findings: overall appearance       Nutrition Follow-Up    RD Follow-up?: Yes    Sumaya Carver, Registration Eligible, Provisional LDN

## 2024-03-15 NOTE — PROVIDER TRANSFER
Outside Transfer Acceptance Note / Regional Referral Center    Referring facility: [facility]   Referring provider: Isabella Sung  Accepting facility: [facility] [place]  Accepting provider: Mak Spencer   Admitting provider: Ismael Madera   Reason for transfer:  Neurology evaluation   Transfer diagnosis: Wernicke's encephalopathy  Transfer specialty requested: Neurology  Transfer specialty notified: No  Transfer level: NUMBER 1-5: 2  Bed type requested: Telemetry / Med Tele   Isolation status: No active isolations   Admission class or status: IP- Inpatient      Narrative     The patient is a 52 y/o male with PMH of COPD and alcohol dependence who presented with weakness, nausea, and vomiting. Patient drinks about one gallon of alcohol per day and last drink was about two days ago. He reports feeling shaky as well with inability to stand and walk properly. Per EMS he fell when trying to walk. There is concern for Wernicke's. MRI showed: Minor deep white matter ischemic changes noted in the left cerebrum otherwise negative MRI of the brain. Referring facility transferring for neurology evaluation. Patient stable. Mild tremors. Given librium and thiamine and IVF.     Objective     Vitals: Temp: 98.2 °F (36.8 °C) (03/14/24 1747)  Pulse: 81 (03/14/24 1936)  Resp: 20 (03/14/24 1829)  BP: 133/79 (03/14/24 1936)  SpO2: 95 % (03/14/24 1936)  Recent Labs: All pertinent labs within the past 24 hours have been reviewed.  Recent imaging: see MRI and CT    Airway:     Vent settings:         IV access:        Peripheral IV - Single Lumen 03/14/24 1341 18 G Right Antecubital (Active)   Site Assessment Clean;Dry;Intact;No swelling;No redness 03/14/24 1341   Extremity Assessment Distal to IV No swelling;No warmth;No abnormal discoloration;No redness 03/14/24 1341   Line Status Blood return noted;Flushed;Infusing 03/14/24 1341   Dressing Status Clean;Dry 03/14/24 1341   Dressing Intervention First dressing 03/14/24  0387     Infusions:   Allergies:   Review of patient's allergies indicates:   Allergen Reactions    Diphenoxylate-atropine Shortness Of Breath and Other (See Comments)     Esophagus tightens      NPO: No    Anticoagulation:   Anticoagulants       None             Instructions      [facility]  Admit to Hospital Medicine  Upon patient arrival to floor, please contact Hospital Medicine on call.

## 2024-03-15 NOTE — CARE UPDATE
Patient has been seen and examinated,patient with a past medical history of COPD, alcohol use, depression, anxiety, who presents with gait instability. Duo to alcohol withdraw,has tremor,increased Valium,started on IVF ,continue with vitamins and consulted PT,OT

## 2024-03-15 NOTE — PLAN OF CARE
Recommendations    Continue Regular diet, monitoring PO intake of meals and snacks  Continue to monitor weights and labs  Collaboration with medical providers    Goals: 1. Pt to meet % EEN/EPN by RD follow up  Nutrition Goal Status: new  Communication of RD Recs:  (POC)    Assessment and Plan    Nutrition Problem  Inadequate oral intake    Related to (etiology):   Diagnosis related symptoms    Signs and Symptoms (as evidenced by):   Intake < needs    Interventions/Recommendations (treatment strategy):  Collaboration with medical providers    Nutrition Diagnosis Status:   New

## 2024-03-15 NOTE — H&P
[facility] Medicine  History & Physical    Patient Name: Maicol Millan III  MRN: [id number]  Patient Class: IP- Inpatient  Admission Date: 3/14/2024  Attending Physician: Brian Busby MD   Primary Care Provider: Arline Primary Doctor         Patient information was obtained from patient and ER records.     Subjective:     Principal Problem:Alcohol withdrawal    Chief Complaint: No chief complaint on file.       HPI: This is a 53-year-old male with a past medical history of COPD, alcohol use, depression, anxiety, who presents with gait instability.      Patient developed weakness/ gait instability around 3:00 a.m. on the morning of presentation.  He had difficulty with ambulation, fell to the floor and was unable to get up.  Since then, he had 2 more falls, and throughout the day he has been having persistent vomiting ( > 20 episodes reported ), and diarrhea ( 6-7 episodes).  He typically drinks about a gal of vodka a day and has been drinking since the age of 5.  He has been trying to detox, and his last alcoholic drink was 2 days prior.  He also smokes 1 pack of cigarettes daily.    In the ED, the patient was hemodynamically stable.  Labs were remarkable for normocytic anemia (11.7), thrombocytopenia (91-around baseline), elevated bilirubin (1.5).  UDS was positive for benzos.  CT head showed no acute abnormality.  MRI brain showed minor deep white matter ischemic changes noted in the left cerebrum.  Patient was given 2 L of NS, thiamine 500 mg IV x2, Zofran 8 mg IV, Reglan 5 mg IV, Ativan 1 mg IV, diazepam 10 mg IV, vitamin B12 1000 mcg IM, and Librium 25 mg p.o. x1.  He was admitted for further evaluation of possible Wernicke's encephalopathy based on MRI results.    Past Medical History:   Diagnosis Date    Addiction to drug     Alcohol abuse     last drink 9/06/22    Anxiety     Bipolar disorder     COPD (chronic obstructive pulmonary disease)     Depression     Disc disorder     Fatigue      Headache     History of psychiatric hospitalization     Hx of psychiatric care     Panic disorder     Psychiatric problem     PTSD (post-traumatic stress disorder)     Sleep difficulties     Suicide attempt     Therapy     Withdrawal symptoms, alcohol        Past Surgical History:   Procedure Laterality Date    APPENDECTOMY      COLONOSCOPY      polyps    HERNIA REPAIR      x 2       Review of patient's allergies indicates:   Allergen Reactions    Diphenoxylate-atropine Shortness Of Breath and Other (See Comments)     Esophagus tightens       Current Facility-Administered Medications on File Prior to Encounter   Medication    [COMPLETED] cyanocobalamin injection 1,000 mcg    [COMPLETED] diazePAM tablet 10 mg    [COMPLETED] LORazepam injection 1 mg    [COMPLETED] metoclopramide injection 5 mg    [COMPLETED] ondansetron injection 8 mg    [COMPLETED] sodium chloride 0.9% bolus 1,000 mL 1,000 mL    [COMPLETED] sodium chloride 0.9% bolus 1,000 mL 1,000 mL    [COMPLETED] thiamine (B-1) 500 mg in dextrose 5 % (D5W) 100 mL IVPB    [COMPLETED] thiamine (B-1) 500 mg in dextrose 5 % (D5W) 100 mL IVPB    [DISCONTINUED] chlordiazepoxide capsule 25 mg    [DISCONTINUED] ondansetron injection 4 mg    [DISCONTINUED] thiamine injection 100 mg     Current Outpatient Medications on File Prior to Encounter   Medication Sig    albuterol (PROVENTIL/VENTOLIN HFA) 90 mcg/actuation inhaler Inhale 2 puffs into the lungs every 4 (four) hours as needed for Wheezing or Shortness of Breath.    ARIPiprazole (ABILIFY) 5 MG Tab Take 1 tablet (5 mg total) by mouth once daily.    cetirizine (ZYRTEC) 10 MG tablet Take 1 tablet (10 mg total) by mouth once daily.    EScitalopram oxalate (LEXAPRO) 20 MG tablet Take 1 tablet (20 mg total) by mouth once daily.    famotidine (PEPCID) 20 MG tablet Take 1 tablet (20 mg total) by mouth 2 (two) times daily.    fluticasone propionate (FLOVENT HFA) 110 mcg/actuation inhaler Inhale 2 puffs into the lungs 2 (two)  times daily. Controller    hydrOXYzine pamoate (VISTARIL) 25 MG Cap Take 1 capsule (25 mg total) by mouth every 6 (six) hours as needed (anxiety).    LORazepam (ATIVAN) 0.5 MG tablet Take 1 tablet (0.5 mg total) by mouth 2 (two) times daily for 2 days, THEN 1 tablet (0.5 mg total) once daily for 2 days.    meclizine (ANTIVERT) 25 mg tablet Take 1 tablet (25 mg total) by mouth 3 (three) times daily as needed for Dizziness.    ondansetron (ZOFRAN) 8 MG tablet Take 1 tablet (8 mg total) by mouth every 8 (eight) hours as needed for Nausea.    rOPINIRole (REQUIP) 1 MG tablet Take 1 tablet (1 mg total) by mouth every evening.    thiamine 100 MG tablet Take 1 tablet (100 mg total) by mouth once daily.    tiotropium-olodateroL (STIOLTO RESPIMAT) 2.5-2.5 mcg/actuation Mist Inhale 2 puffs into the lungs once daily. Controller    traZODone (DESYREL) 100 MG tablet Take 2 tablets (200 mg total) by mouth every evening. (Patient not taking: Reported on 2/20/2024)     Family History       Problem Relation (Age of Onset)    Alcohol abuse Maternal Grandfather, Paternal Grandfather    Anxiety disorder Mother    Depression Mother    Heart block Father    No Known Problems Sister, Brother          Tobacco Use    Smoking status: Every Day     Current packs/day: 1.00     Average packs/day: 1 pack/day for 38.2 years (38.2 ttl pk-yrs)     Types: Cigarettes     Start date: 1/8/1986    Smokeless tobacco: Never   Substance and Sexual Activity    Alcohol use: Yes     Comment: daily drinker--alcohol abuse    Drug use: Not Currently     Types: Marijuana    Sexual activity: Yes     Birth control/protection: None     Review of Systems   Constitutional: Negative.    HENT: Negative.     Eyes: Negative.    Respiratory: Negative.     Cardiovascular: Negative.    Gastrointestinal:  Positive for diarrhea and vomiting.   Endocrine: Negative.    Genitourinary: Negative.    Musculoskeletal: Negative.    Skin: Negative.    Allergic/Immunologic: Negative.     Neurological:  Positive for dizziness and tremors.        Gait instability    Psychiatric/Behavioral: Negative.       Objective:     Vital Signs (Most Recent):  Temp: 98 °F (36.7 °C) (03/14/24 2256)  Pulse: 78 (03/14/24 2311)  Resp: 20 (03/14/24 2256)  BP: (!) 142/76 (03/14/24 2256)  SpO2: 99 % (03/14/24 2256) Vital Signs (24h Range):  Temp:  [97.9 °F (36.6 °C)-98.2 °F (36.8 °C)] 98 °F (36.7 °C)  Pulse:  [76-91] 78  Resp:  [20-22] 20  SpO2:  [95 %-99 %] 99 %  BP: (116-144)/(59-82) 142/76     Weight: 84.8 kg (186 lb 15.2 oz)  Body mass index is 25.35 kg/m².     Physical Exam  Vitals and nursing note reviewed.   Constitutional:       General: He is not in acute distress.     Appearance: Normal appearance. He is not ill-appearing.   HENT:      Head: Normocephalic and atraumatic.      Nose: Nose normal.      Mouth/Throat:      Mouth: Mucous membranes are moist.   Eyes:      Extraocular Movements: Extraocular movements intact.   Cardiovascular:      Rate and Rhythm: Normal rate.      Pulses: Normal pulses.      Heart sounds: No murmur heard.  Pulmonary:      Effort: Pulmonary effort is normal. No respiratory distress.   Abdominal:      General: Abdomen is flat.      Palpations: Abdomen is soft.      Tenderness: There is no abdominal tenderness.   Musculoskeletal:      Right lower leg: No edema.      Left lower leg: No edema.   Skin:     General: Skin is warm.      Capillary Refill: Capillary refill takes less than 2 seconds.   Neurological:      Mental Status: He is alert.      Comments: Right facial numbness. Tremulous   Decreased effort with exam.   +Dizziness with ambulation/movement   Psychiatric:         Mood and Affect: Mood normal.                Significant Labs: All pertinent labs within the past 24 hours have been reviewed.    Significant Imaging: I have reviewed all pertinent imaging results/findings within the past 24 hours.  Assessment/Plan:     * Alcohol withdrawal  - Patient reports drinking a gallon of  sonja daily     Plan:   - [facility] protocol   - Educate patient on alcohol cessation   - Supplement thiamine, folate   - Follow up with PCP as outpatient         Abnormal brain MRI  Neurology consult.   Lower suspicion of Wernicke's encephalopathy      Tobacco abuse   on cessation       Major depressive disorder with current active episode   Not currently taking any medications.  Outpatient follow-up      Stage 2 moderate COPD by GOLD classification   P.r.mellisa Damon.  No acute issues      VTE Risk Mitigation (From admission, onward)           Ordered     IP VTE LOW RISK PATIENT  Once         03/14/24 2259     Place sequential compression device  Until discontinued         03/14/24 5049                          Ismael Madera MD  Department of Hospital Medicine  [facility] - Telemetry

## 2024-03-15 NOTE — NURSING
[facility]  Nurses Note -- 4 Eyes      3/15/2024       Skin assessed on: Admit      [x] No Pressure Injuries Present    []Prevention Measures Documented    [] Yes LDA  for Pressure Injury Previously documented     [] Yes New Pressure Injury Discovered   [] LDA for New Pressure Injury Added      Attending RN:  Preethi Galindo RN     Second RN:  RICO Martínez

## 2024-03-15 NOTE — PLAN OF CARE
Case Management Assessment     PCP: Jie Lam  Pharmacy: [facility]    Patient Arrived From: Home  Existing Help at Home: Preethi- significant other    Barriers to Discharge: Substance Use    Discharge Plan:    A. Home with family   B. Other- TBD    CM discussed discharge planning with pt's significant other, Preethi. Pt is independent and doesn't use DME. Pt and pt's significant other, Preethi will need transportation home when discharged.        03/15/24 1340   Discharge Assessment   Assessment Type Discharge Planning Assessment   Confirmed/corrected address, phone number and insurance Yes   Confirmed Demographics Correct on Facesheet   Source of Information other (see comments)  (Preethi- significant other)   When was your last doctors appointment? 02/20/24   Reason For Admission Alcohol withdrawal   People in Home significant other   Facility Arrived From: home   Do you expect to return to your current living situation? Yes   Do you have help at home or someone to help you manage your care at home? Yes   Who are your caregiver(s) and their phone number(s)? DESIREE Oro [phone]   Prior to hospitilization cognitive status: Alert/Oriented   Current cognitive status: Unable to Assess   Walking or Climbing Stairs Difficulty no   Dressing/Bathing Difficulty no   Equipment Currently Used at Home none   Readmission within 30 days? No   Patient currently being followed by outpatient case management? No   Do you currently have service(s) that help you manage your care at home? No   Is the pt/caregiver preference to resume services with current agency No   Do you take prescription medications? Yes   Do you have prescription coverage? Yes   Coverage MEDICAID- [employer]   Do you have any problems affording any of your prescribed medications? TBD   Is the patient taking medications as prescribed? yes   Who is going to help you get home at discharge? Pt will need transportation home.   How do you get to doctors  appointments? family or friend will provide;health plan transportation   Are you on dialysis? No   Do you take coumadin? No   Discharge Plan A Home with family   Discharge Plan B Other  (tbd)   DME Needed Upon Discharge  other (see comments)  (tbd)   Discharge Plan discussed with: Spouse/sig other   Name(s) and Number(s) Jessie [phone]   Transition of Care Barriers Substance Abuse   SDOH   (none)   Physical Activity   On average, how many days per week do you engage in moderate to strenuous exercise (like a brisk walk)? 0 days   On average, how many minutes do you engage in exercise at this level? 0 min   Financial Resource Strain   How hard is it for you to pay for the very basics like food, housing, medical care, and heating? Hard   Housing Stability   In the last 12 months, was there a time when you were not able to pay the mortgage or rent on time? N   In the last 12 months, how many places have you lived? 1   In the last 12 months, was there a time when you did not have a steady place to sleep or slept in a shelter (including now)? N   Transportation Needs   In the past 12 months, has lack of transportation kept you from medical appointments or from getting medications? no   In the past 12 months, has lack of transportation kept you from meetings, work, or from getting things needed for daily living? No   Food Insecurity   Within the past 12 months, you worried that your food would run out before you got the money to buy more. Sometimes   Within the past 12 months, the food you bought just didn't last and you didn't have money to get more. Sometimes   Stress   Do you feel stress - tense, restless, nervous, or anxious, or unable to sleep at night because your mind is troubled all the time - these days? Pt Unable   Social Connections   In a typical week, how many times do you talk on the phone with family, friends, or neighbors? Three   How often do you get together with friends or relatives? Three times    How often do you attend Rastafari or Sabianism services? Never   Do you belong to any clubs or organizations such as Rastafari groups, unions, fraternal or athletic groups, or school groups? No   How often do you attend meetings of the clubs or organizations you belong to? Never   Are you , , , , never , or living with a partner? Living with   Alcohol Use   Q1: How often do you have a drink containing alcohol? 4 or more ti   Q2: How many drinks containing alcohol do you have on a typical day when you are drinking? 7 to 9   Q3: How often do you have six or more drinks on one occasion? Daily   OTHER   Name(s) of People in Home Preethi- significant other

## 2024-03-16 LAB
CHOLEST SERPL-MCNC: 206 MG/DL (ref 120–199)
CHOLEST/HDLC SERPL: 2 {RATIO} (ref 2–5)
ESTIMATED AVG GLUCOSE: 103 MG/DL (ref 68–131)
HBA1C MFR BLD: 5.2 % (ref 4–5.6)
HDLC SERPL-MCNC: 103 MG/DL (ref 40–75)
HDLC SERPL: 50 % (ref 20–50)
LDLC SERPL CALC-MCNC: 84.2 MG/DL (ref 63–159)
NONHDLC SERPL-MCNC: 103 MG/DL
TRIGL SERPL-MCNC: 94 MG/DL (ref 30–150)

## 2024-03-16 PROCEDURE — 84425 ASSAY OF VITAMIN B-1: CPT | Performed by: INTERNAL MEDICINE

## 2024-03-16 PROCEDURE — 25000003 PHARM REV CODE 250: Performed by: STUDENT IN AN ORGANIZED HEALTH CARE EDUCATION/TRAINING PROGRAM

## 2024-03-16 PROCEDURE — 97165 OT EVAL LOW COMPLEX 30 MIN: CPT

## 2024-03-16 PROCEDURE — 36415 COLL VENOUS BLD VENIPUNCTURE: CPT | Performed by: INTERNAL MEDICINE

## 2024-03-16 PROCEDURE — 83036 HEMOGLOBIN GLYCOSYLATED A1C: CPT | Performed by: INTERNAL MEDICINE

## 2024-03-16 PROCEDURE — 21400001 HC TELEMETRY ROOM

## 2024-03-16 PROCEDURE — 63600175 PHARM REV CODE 636 W HCPCS: Performed by: HOSPITALIST

## 2024-03-16 PROCEDURE — 25000003 PHARM REV CODE 250: Performed by: HOSPITALIST

## 2024-03-16 PROCEDURE — 80061 LIPID PANEL: CPT | Performed by: INTERNAL MEDICINE

## 2024-03-16 PROCEDURE — 97161 PT EVAL LOW COMPLEX 20 MIN: CPT | Performed by: PHYSICAL THERAPIST

## 2024-03-16 RX ORDER — TRAZODONE HYDROCHLORIDE 50 MG/1
50 TABLET ORAL NIGHTLY
Status: DISCONTINUED | OUTPATIENT
Start: 2024-03-16 | End: 2024-03-17 | Stop reason: HOSPADM

## 2024-03-16 RX ORDER — SODIUM CHLORIDE 9 MG/ML
INJECTION, SOLUTION INTRAVENOUS CONTINUOUS
Status: DISCONTINUED | OUTPATIENT
Start: 2024-03-16 | End: 2024-03-17 | Stop reason: HOSPADM

## 2024-03-16 RX ORDER — LOPERAMIDE HYDROCHLORIDE 2 MG/1
2 CAPSULE ORAL 4 TIMES DAILY PRN
Status: DISCONTINUED | OUTPATIENT
Start: 2024-03-16 | End: 2024-03-17 | Stop reason: HOSPADM

## 2024-03-16 RX ADMIN — THIAMINE HCL TAB 100 MG 100 MG: 100 TAB at 08:03

## 2024-03-16 RX ADMIN — SODIUM CHLORIDE AND POTASSIUM CHLORIDE: 9; 1.49 INJECTION, SOLUTION INTRAVENOUS at 01:03

## 2024-03-16 RX ADMIN — DIAZEPAM 10 MG: 5 TABLET ORAL at 11:03

## 2024-03-16 RX ADMIN — DIAZEPAM 10 MG: 5 TABLET ORAL at 05:03

## 2024-03-16 RX ADMIN — FOLIC ACID 1 MG: 1 TABLET ORAL at 08:03

## 2024-03-16 RX ADMIN — TRAZODONE HYDROCHLORIDE 50 MG: 50 TABLET ORAL at 08:03

## 2024-03-16 RX ADMIN — SODIUM CHLORIDE: 9 INJECTION, SOLUTION INTRAVENOUS at 09:03

## 2024-03-16 NOTE — PLAN OF CARE
Problem: Physical Therapy  Goal: Physical Therapy Goal  Description: Goals to be met by: 3/30/24     Patient will increase functional independence with mobility by performin. Rolling to Left and Right with Modified [place].  2. Sit to stand transfer with Modified [place]  3. Gait  x 300 feet with Modified [place] using No Assistive Device.     3/16/2024 1305 by Travis Hernandez, PT  Outcome: Ongoing, Progressing    PT eval completed today. Pt presents with good functional mobility, limited by tremors putting him at inc risk for falls. Pt tolerates all gait activities well but endorses he is a little less steady on his feet than his PLOF. Pt would benefit from skilled therapy to continue progressing activity tolerance and to ensure balance returns to his PLOF. No DME needs at this time. Pt would benefit from low intensity therapy at discharge but reports there are barriers for him to get to an outpatient clinic.

## 2024-03-16 NOTE — PLAN OF CARE
Problem: Occupational Therapy  Goal: Occupational Therapy Goal  Description: Goals to be met by: 3/23/2024     Patient will increase functional independence with ADLs by performing:    Upper extremity exercise program x15 reps per handout, with independence.    Outcome: Ongoing, Progressing     The patient will be seen 1-2x for UE HEP

## 2024-03-16 NOTE — NURSING
Wound care to scrotum and penile area done as ordered. Pt turned, mepilex applied to sacral area. Foam boots placed to Kunal LEs.

## 2024-03-16 NOTE — SUBJECTIVE & OBJECTIVE
Interval History: Still has Tremors,will continue with high dose valium and IVF.    Review of Systems   Constitutional:  Positive for activity change and appetite change.   HENT:  Negative for congestion, dental problem and drooling.    Respiratory:  Negative for apnea and choking.    Gastrointestinal:  Negative for abdominal distention and abdominal pain.   Genitourinary:  Negative for difficulty urinating and dysuria.   Neurological:  Positive for tremors.     Objective:     Vital Signs (Most Recent):  Temp: 97.5 °F (36.4 °C) (03/16/24 0738)  Pulse: 84 (03/16/24 0747)  Resp: 20 (03/16/24 0738)  BP: 135/84 (03/16/24 0738)  SpO2: 98 % (03/16/24 0738) Vital Signs (24h Range):  Temp:  [97.5 °F (36.4 °C)-97.9 °F (36.6 °C)] 97.5 °F (36.4 °C)  Pulse:  [] 84  Resp:  [18-20] 20  SpO2:  [97 %-98 %] 98 %  BP: (131-135)/(65-86) 135/84     Weight: 83.5 kg (184 lb 1.4 oz)  Body mass index is 24.97 kg/m².    Intake/Output Summary (Last 24 hours) at 3/16/2024 1121  Last data filed at 3/16/2024 0148  Gross per 24 hour   Intake 1004 ml   Output --   Net 1004 ml         Physical Exam  Cardiovascular:      Rate and Rhythm: Normal rate and regular rhythm.   Pulmonary:      Effort: No respiratory distress.      Breath sounds: No wheezing.   Abdominal:      General: There is no distension.      Tenderness: There is no abdominal tenderness.   Skin:     Coloration: Skin is not jaundiced.   Neurological:      Mental Status: He is alert.      Cranial Nerves: No cranial nerve deficit.      Motor: No weakness.             Significant Labs: All pertinent labs within the past 24 hours have been reviewed.  BMP:   Recent Labs   Lab 03/14/24  1503   *      K 3.7      CO2 26   BUN 15   CREATININE 0.7   CALCIUM 8.6*     CBC:   Recent Labs   Lab 03/14/24  1503   WBC 5.57   HGB 11.7*   HCT 34.5*   PLT 91*     CMP:   Recent Labs   Lab 03/14/24  1503      K 3.7      CO2 26   *   BUN 15   CREATININE 0.7    CALCIUM 8.6*   PROT 6.7   ALBUMIN 3.6   BILITOT 1.5*   ALKPHOS 98   AST 24   ALT 14   ANIONGAP 11       Significant Imaging: I have reviewed all pertinent imaging results/findings within the past 24 hours.

## 2024-03-16 NOTE — ASSESSMENT & PLAN NOTE
- Patient reports drinking a gallon of vodka daily     Plan:   - [facility] protocol   - Educate patient on alcohol cessation   - Supplement thiamine, folate   - Follow up with PCP as outpatient .  Still has Tremors,will continue with high dose valium and IVF.

## 2024-03-16 NOTE — PLAN OF CARE
Problem: Alcohol Withdrawal  Goal: Alcohol Withdrawal Symptom Control  Outcome: Ongoing, Progressing  Intervention: Minimize or Manage Alcohol Withdrawal Symptoms  Flowsheets (Taken 3/16/2024 1970)  Aspiration Precautions: awake/alert before oral intake  Seizure Precautions: clutter-free environment maintained  Sensory Stimulation Regulation:   care clustered   quiet environment promoted   television on     Problem: Acute Neurologic Deterioration (Alcohol Withdrawal)  Goal: Optimal Neurologic Function  Outcome: Ongoing, Progressing  Intervention: Minimize or Manage Acute Neurologic Symptoms  Flowsheets (Taken 3/16/2024 2635)  Airway/Ventilation Management:   airway patency maintained   calming measures promoted  Sensory Stimulation Regulation:   care clustered   quiet environment promoted   television on  Cerebral Perfusion Promotion: blood pressure monitored     Problem: Substance Misuse (Alcohol Withdrawal)  Goal: Readiness for Change Identified  Outcome: Ongoing, Progressing  Intervention: Promote Psychosocial Wellbeing  Flowsheets (Taken 3/16/2024 0208)  Family/Support System Care:   caregiver stress acknowledged   involvement promoted   presence promoted   self-care encouraged   support provided

## 2024-03-16 NOTE — PT/OT/SLP EVAL
Physical Therapy Evaluation    Patient Name:  Maicol Millan III   MRN:  [id number]    Recommendations:     Discharge Recommendations: Low Intensity Therapy   Discharge Equipment Recommendations: none   Barriers to discharge:  Pt receiving IV medication for ETOH withdrawal    Assessment:     Maicol Millan III is a 53 y.o. male admitted with a medical diagnosis of Alcohol withdrawal.  He presents with the following impairments/functional limitations: gait instability, impaired balance, weakness Pt presents with good functional mobility, limited by tremors putting him at inc risk for falls. Pt tolerates all gait activities well but endorses he is a little less steady on his feet than his PLOF. Pt would benefit from skilled therapy to continue progressing activity tolerance and to ensure balance returns to his PLOF. .    Rehab Prognosis: Good; patient would benefit from acute skilled PT services to address these deficits and reach maximum level of function.    Recent Surgery: * No surgery found *      Plan:     During this hospitalization, patient to be seen 3 x/week to address the identified rehab impairments via gait training, therapeutic activities, therapeutic exercises and progress toward the following goals:    Plan of Care Expires:  03/30/24    Subjective     Chief Complaint: tremors, feeling unsteady on feet  Patient/Family Comments/goals: Pt would like to regain strength/ endurance to return home  Pain/Comfort:  Pain Rating 1: 0/10  Pain Rating Post-Intervention 1: 0/10    Patients cultural, spiritual, Mosque conflicts given the current situation: no    Living Environment:  Pt lives with his wife in 1st floor of apartment w/ 3 [address]  Prior to admission, patients level of function was modified independent.  Equipment used at home: none.  DME owned (not currently used): none.  Upon discharge, patient will have assistance from his wife.    Objective:     Communicated with Loreto shepard prior to session.   Patient found up in chair with telemetry  upon PT entry to room.    General Precautions: Standard, fall  Orthopedic Precautions:N/A   Braces: N/A  Respiratory Status: Room air    Exams:  Cognitive Exam:  Patient is oriented to Person, Place, Time, and Situation  RLE ROM: WFL  RLE Strength: WFL  LLE ROM: WFL  LLE Strength: WFL    Functional Mobility:  Transfers:     Sit to Stand:  supervision with no AD  Gait: Pt amb 200 ft w/ no AD, pt demonstrates slight loss of balance but is able to self correct without PT assist   Balance: good in sitting, fair in standing      AM-PAC 6 CLICK MOBILITY  Total Score:24       Treatment & Education:  Pt educated on importance of continued activity and benefits of pursuing outpatient PT or substance abuse facility at d/c    Patient left up in chair with all lines intact, call button in reach, and nsg notified.    GOALS:   Multidisciplinary Problems       Physical Therapy Goals          Problem: Physical Therapy    Goal Priority Disciplines Outcome Goal Variances Interventions   Physical Therapy Goal     PT, PT/OT Ongoing, Progressing     Description: Goals to be met by: 3/30/24     Patient will increase functional independence with mobility by performin. Rolling to Left and Right with Modified [place].  2. Sit to stand transfer with Modified [place]  3. Gait  x 300 feet with Modified [place] using No Assistive Device.                          History:     Past Medical History:   Diagnosis Date    Addiction to drug     Alcohol abuse     last drink 22    Anxiety     Bipolar disorder     COPD (chronic obstructive pulmonary disease)     Depression     Disc disorder     Fatigue     Headache     History of psychiatric hospitalization     Hx of psychiatric care     Panic disorder     Psychiatric problem     PTSD (post-traumatic stress disorder)     Sleep difficulties     Suicide attempt     Therapy     Withdrawal symptoms, alcohol        Past Surgical History:    Procedure Laterality Date    APPENDECTOMY      COLONOSCOPY      polyps    HERNIA REPAIR      x 2       Time Tracking:     PT Received On: 03/16/24  PT Start Time: 1128     PT Stop Time: 1147  PT Total Time (min): 19 min     Billable Minutes: Evaluation 19 03/16/2024

## 2024-03-16 NOTE — PROGRESS NOTES
[facility] Medicine  Progress Note    Patient Name: Maicol Millan III  MRN: [id number]  Patient Class: IP- Inpatient   Admission Date: 3/14/2024  Length of Stay: 2 days  Attending Physician: Lincoln Calle, *  Primary Care Provider: Arline, Primary Doctor        Subjective:     Principal Problem:Alcohol withdrawal        HPI:  This is a 53-year-old male with a past medical history of COPD, alcohol use, depression, anxiety, who presents with gait instability.      Patient developed weakness/ gait instability around 3:00 a.m. on the morning of presentation.  He had difficulty with ambulation, fell to the floor and was unable to get up.  Since then, he had 2 more falls, and throughout the day he has been having persistent vomiting ( > 20 episodes reported ), and diarrhea ( 6-7 episodes).  He typically drinks about a gal of vodka a day and has been drinking since the age of 5.  He has been trying to detox, and his last alcoholic drink was 2 days prior.  He also smokes 1 pack of cigarettes daily.    In the ED, the patient was hemodynamically stable.  Labs were remarkable for normocytic anemia (11.7), thrombocytopenia (91-around baseline), elevated bilirubin (1.5).  UDS was positive for benzos.  CT head showed no acute abnormality.  MRI brain showed minor deep white matter ischemic changes noted in the left cerebrum.  Patient was given 2 L of NS, thiamine 500 mg IV x2, Zofran 8 mg IV, Reglan 5 mg IV, Ativan 1 mg IV, diazepam 10 mg IV, vitamin B12 1000 mcg IM, and Librium 25 mg p.o. x1.  He was admitted for further evaluation of possible Wernicke's encephalopathy based on MRI results.    Overview/Hospital Course:       Patient has been seen and examinated,patient with a past medical history of COPD, alcohol use, depression, anxiety, who presents with gait instability. Duo to alcohol withdraw,has tremor,increased Valium,started on IVF ,continue with vitamins and consulted PT,OT   Still has  Tremors,will continue with high dose valium and IVF.          Interval History: Still has Tremors,will continue with high dose valium and IVF.    Review of Systems   Constitutional:  Positive for activity change and appetite change.   HENT:  Negative for congestion, dental problem and drooling.    Respiratory:  Negative for apnea and choking.    Gastrointestinal:  Negative for abdominal distention and abdominal pain.   Genitourinary:  Negative for difficulty urinating and dysuria.   Neurological:  Positive for tremors.     Objective:     Vital Signs (Most Recent):  Temp: 97.5 °F (36.4 °C) (03/16/24 0738)  Pulse: 84 (03/16/24 0747)  Resp: 20 (03/16/24 0738)  BP: 135/84 (03/16/24 0738)  SpO2: 98 % (03/16/24 0738) Vital Signs (24h Range):  Temp:  [97.5 °F (36.4 °C)-97.9 °F (36.6 °C)] 97.5 °F (36.4 °C)  Pulse:  [] 84  Resp:  [18-20] 20  SpO2:  [97 %-98 %] 98 %  BP: (131-135)/(65-86) 135/84     Weight: 83.5 kg (184 lb 1.4 oz)  Body mass index is 24.97 kg/m².    Intake/Output Summary (Last 24 hours) at 3/16/2024 1121  Last data filed at 3/16/2024 0148  Gross per 24 hour   Intake 1004 ml   Output --   Net 1004 ml         Physical Exam  Cardiovascular:      Rate and Rhythm: Normal rate and regular rhythm.   Pulmonary:      Effort: No respiratory distress.      Breath sounds: No wheezing.   Abdominal:      General: There is no distension.      Tenderness: There is no abdominal tenderness.   Skin:     Coloration: Skin is not jaundiced.   Neurological:      Mental Status: He is alert.      Cranial Nerves: No cranial nerve deficit.      Motor: No weakness.             Significant Labs: All pertinent labs within the past 24 hours have been reviewed.  BMP:   Recent Labs   Lab 03/14/24  1503   *      K 3.7      CO2 26   BUN 15   CREATININE 0.7   CALCIUM 8.6*     CBC:   Recent Labs   Lab 03/14/24  1503   WBC 5.57   HGB 11.7*   HCT 34.5*   PLT 91*     CMP:   Recent Labs   Lab 03/14/24  1503      K 3.7       CO2 26   *   BUN 15   CREATININE 0.7   CALCIUM 8.6*   PROT 6.7   ALBUMIN 3.6   BILITOT 1.5*   ALKPHOS 98   AST 24   ALT 14   ANIONGAP 11       Significant Imaging: I have reviewed all pertinent imaging results/findings within the past 24 hours.    Assessment/Plan:      * Alcohol withdrawal  - Patient reports drinking a gallon of vodka daily     Plan:   - [facility] protocol   - Educate patient on alcohol cessation   - Supplement thiamine, folate   - Follow up with PCP as outpatient .  Still has Tremors,will continue with high dose valium and IVF.        Abnormal brain MRI  Neurology consult.   Lower suspicion of Wernicke's encephalopathy.seen by neurology,on thiamin and  alert time 3.      Tobacco abuse   on cessation       Major depressive disorder with current active episode   Not currently taking any medications.  Outpatient follow-up      Stage 2 moderate COPD by GOLD classification   P.r.n. Nelson.  No acute issues      VTE Risk Mitigation (From admission, onward)           Ordered     IP VTE LOW RISK PATIENT  Once         03/14/24 2259     Place sequential compression device  Until discontinued         03/14/24 2259                    Discharge Planning   JORDAN:      Code Status: Full Code   Is the patient medically ready for discharge?:     Reason for patient still in hospital (select all that apply): Patient trending condition  Discharge Plan A: Home with family                  Lincoln Calle MD  Department of Hospital Medicine   [facility] [place] [facility]

## 2024-03-16 NOTE — PT/OT/SLP EVAL
Occupational Therapy Evaluation     Name: Maicol Millan III  MRN: [id number]  Admitting Diagnosis: Alcohol withdrawal  Recent Surgery: * No surgery found *      Recommendations:     Discharge Recommendations: No Therapy Indicated  Level of Assistance Recommended:  may required (S)  Discharge Equipment Recommendations: none  Barriers to discharge: None    Assessment:     Maicol Millan III is a 53 y.o. male with a medical diagnosis of Alcohol withdrawal. He presents with performance deficits affecting function including weakness. The patient is able to perform self care and amb with (I)/(S) with sone hand tremors present that the patient reports as chronic. The patient will benefit from an UE HEP to address UE weakness. The patient verbalized understanding of recommendation to receive (S) with mobility 2* currently detoxing.     Rehab Prognosis: Good and Fair; patient would benefit from acute OT services to address these deficits and reach maximum level of function.    Plan:     Patient to be seen 2 x/week to address the above listed problems via therapeutic exercises  Plan of Care Expires: 03/23/24  Plan of Care Reviewed with: patient, spouse    Subjective     Chief Complaint: none  Patient Comments/Goals: wants an UE HEP  Pain/Comfort:  Pain Rating 1: 0/10    Patients cultural, spiritual, Mandaen conflicts given the current situation: no    Social History:  Living Environment: Patient lives with their spouse in a first floor apartment with tub-shower combo  Prior Level of Function: Prior to admission, patient was independent  Roles and Routines: Patient was not working prior to admission. The patient states he is working on getting disability. He is able to drive but does not have a car.  Equipment Used at Home: none  DME owned (not currently used): none  Assistance Upon Discharge: significant other    Objective:     Communicated with nurseJohnnie prior to session. Patient found  seated on the couch  with  telemetry upon OT entry to room.    General Precautions: Standard, fall (ETOH withdrawals)   Orthopedic Precautions: N/A   Braces: N/A    Respiratory Status: Room air    Occupational Performance    Gait belt applied - Yes    Bed Mobility:   N/T    Functional Mobility/Transfers:  Sit <> Stand Transfer with modified independence with no AD  Toilet Transfer Step Transfer technique with modified independence with IV pole  Functional Mobility: The patient amb to the bathroom and sink via pushing the IV pols with Mod I /(S)    Activities of Daily Living:  Grooming: modified independence and set up assistance to was hands while standing at the sink  Upper Body Dressing: contact guard assistance to don gown (limited by IV line)  Lower Body Dressing: modified independence to doff socks and don hospital socks    Cognitive/Visual Perceptual:  Cognitive/Psychosocial Skills:    -     Oriented to: Person, Place, Time, Situation  -     Follows Commands/attention: Follows two-step commands  -     Communication: clear/fluent  -     Memory: No Deficits noted  -     Safety awareness/insight to disability: impaired  -     Mood/Affect/Coping skills/emotional control: Appropriate to situation  Visual/Perceptual:    -     Intact    Physical Exam:  Balance:    -     Sitting: independence  -     Standing: modified independence and supervision  Postural examination/scapula alignment:    -       No postural abnormalities identified  Skin integrity: Visible skin intact  Edema:  None noted  Upper Extremity Range of Motion:     -       Right Upper Extremity: WNL  -       Left Upper Extremity: WNL  Upper Extremity Strength:    -       Right Upper Extremity: 4/5  -       Left Upper Extremity: 4/5   Strength:    -       Right Upper Extremity: WFL  -       Left Upper Extremity: WFL    AMPAC 6 Click ADL:  AMPAC Total Score: 24    Treatment & Education:  Patient educated on role of OT, POC, and goals for therapy  Educated the patient re: (S)  recommended with amb 2* ETOH W/D and possible unsteady gait/balance issues      Patient left  seated on the couch  with all lines intact, call button in reach, RN notified, and spouse and PETRA Robles present.    GOALS:   Multidisciplinary Problems       Occupational Therapy Goals          Problem: Occupational Therapy    Goal Priority Disciplines Outcome Interventions   Occupational Therapy Goal     OT, PT/OT Ongoing, Progressing    Description: Goals to be met by: 3/23/2024     Patient will increase functional independence with ADLs by performing:    Upper extremity exercise program x15 reps per handout, with independence.                         History:     Past Medical History:   Diagnosis Date    Addiction to drug     Alcohol abuse     last drink 9/06/22    Anxiety     Bipolar disorder     COPD (chronic obstructive pulmonary disease)     Depression     Disc disorder     Fatigue     Headache     History of psychiatric hospitalization     Hx of psychiatric care     Panic disorder     Psychiatric problem     PTSD (post-traumatic stress disorder)     Sleep difficulties     Suicide attempt     Therapy     Withdrawal symptoms, alcohol          Past Surgical History:   Procedure Laterality Date    APPENDECTOMY      COLONOSCOPY      polyps    HERNIA REPAIR      x 2       Time Tracking:     OT Date of Treatment: 03/16/24  OT Start Time: 1120  OT Stop Time: 1135  OT Total Time (min): 15 min    Billable Minutes: Evaluation 15    3/16/2024

## 2024-03-16 NOTE — NURSING
[facility]  Nurses Note -- 4 Eyes      3/16/2024       Skin assessed on: Q Shift      [x] No Pressure Injuries Present    []Prevention Measures Documented    [] Yes LDA  for Pressure Injury Previously documented     [] Yes New Pressure Injury Discovered   [] LDA for New Pressure Injury Added      Attending RN:  Johnnie Brannon, RN     Second RN:  Preethi

## 2024-03-16 NOTE — ASSESSMENT & PLAN NOTE
Neurology consult.   Lower suspicion of Wernicke's encephalopathy.seen by neurology,on thiamin and  alert time 3.

## 2024-03-17 VITALS
SYSTOLIC BLOOD PRESSURE: 141 MMHG | BODY MASS INDEX: 24.93 KG/M2 | WEIGHT: 184.06 LBS | TEMPERATURE: 98 F | DIASTOLIC BLOOD PRESSURE: 75 MMHG | RESPIRATION RATE: 18 BRPM | OXYGEN SATURATION: 100 % | HEIGHT: 72 IN | HEART RATE: 81 BPM

## 2024-03-17 LAB
E COLI SXT1 STL QL IA: NEGATIVE
E COLI SXT2 STL QL IA: NEGATIVE

## 2024-03-17 PROCEDURE — 25000003 PHARM REV CODE 250

## 2024-03-17 PROCEDURE — 25000003 PHARM REV CODE 250: Performed by: HOSPITALIST

## 2024-03-17 PROCEDURE — S4991 NICOTINE PATCH NONLEGEND: HCPCS

## 2024-03-17 PROCEDURE — 63600175 PHARM REV CODE 636 W HCPCS: Performed by: STUDENT IN AN ORGANIZED HEALTH CARE EDUCATION/TRAINING PROGRAM

## 2024-03-17 RX ORDER — IBUPROFEN 200 MG
1 TABLET ORAL DAILY
Status: DISCONTINUED | OUTPATIENT
Start: 2024-03-17 | End: 2024-03-17 | Stop reason: HOSPADM

## 2024-03-17 RX ADMIN — DIAZEPAM 10 MG: 5 TABLET ORAL at 06:03

## 2024-03-17 RX ADMIN — Medication 1 PATCH: at 01:03

## 2024-03-17 RX ADMIN — LORAZEPAM 2 MG: 2 INJECTION INTRAMUSCULAR; INTRAVENOUS at 04:03

## 2024-03-17 NOTE — PLAN OF CARE
Problem: Adult Inpatient Plan of Care  Goal: Plan of Care Review  Outcome: Ongoing, Progressing  Goal: Patient-Specific Goal (Individualized)  Outcome: Ongoing, Progressing  Goal: Absence of Hospital-Acquired Illness or Injury  Outcome: Ongoing, Progressing  Goal: Optimal Comfort and Wellbeing  Outcome: Ongoing, Progressing  Goal: Readiness for Transition of Care  Outcome: Ongoing, Progressing     Problem: Violence Risk or Actual  Goal: Anger and Impulse Control  Outcome: Ongoing, Progressing     Problem: Infection  Goal: Absence of Infection Signs and Symptoms  Outcome: Ongoing, Progressing     Problem: Alcohol Withdrawal  Goal: Alcohol Withdrawal Symptom Control  Outcome: Ongoing, Progressing     Problem: Acute Neurologic Deterioration (Alcohol Withdrawal)  Goal: Optimal Neurologic Function  Outcome: Ongoing, Progressing     Problem: Substance Misuse (Alcohol Withdrawal)  Goal: Readiness for Change Identified  Outcome: Ongoing, Progressing

## 2024-03-17 NOTE — NURSING
Pt educated on the reason and need to tele monitor but continues to refuse. Will continue to monitor

## 2024-03-17 NOTE — NURSING
PER handoff received from ENIO Mcnally     Pt resting in bed quietly. NAD noted. No c/o pain.     Fall and safety precautions maintained.  Bed locked in lowest position, with side rails up x2. Call bell and personal items within reach

## 2024-03-17 NOTE — NURSING
Patient states that he has called his ride to pick him up from hospital. Explained to patient that he is not discharged per MD and that if he desired to leave he would have to sign AMA papers. Patient states he understood and agreed to sign the AMA. Consequences of leaving AMA explained to patient including worsening of symptoms and/or death. Patient voiced understanding and signed AMA form. Dr Sandhu notified. PIV removed and patient left the floor.

## 2024-03-17 NOTE — DISCHARGE SUMMARY
[facility] Medicine  Discharge Summary      Patient Name: Maicol Millan III  MRN: [id number]  [facility]: [id number]  Patient Class: IP- Inpatient  Admission Date: 3/14/2024  Hospital Length of Stay: 3 days  Discharge Date and Time:  03/17/2024 10:23 AM  Attending Physician: Arline att. providers found   Discharging Provider: Lincoln Calle MD  Primary Care Provider: Arline, Primary Doctor    Primary Care Team: Networked reference to record PCT     HPI:   This is a 53-year-old male with a past medical history of COPD, alcohol use, depression, anxiety, who presents with gait instability.      Patient developed weakness/ gait instability around 3:00 a.m. on the morning of presentation.  He had difficulty with ambulation, fell to the floor and was unable to get up.  Since then, he had 2 more falls, and throughout the day he has been having persistent vomiting ( > 20 episodes reported ), and diarrhea ( 6-7 episodes).  He typically drinks about a gal of vodka a day and has been drinking since the age of 5.  He has been trying to detox, and his last alcoholic drink was 2 days prior.  He also smokes 1 pack of cigarettes daily.    In the ED, the patient was hemodynamically stable.  Labs were remarkable for normocytic anemia (11.7), thrombocytopenia (91-around baseline), elevated bilirubin (1.5).  UDS was positive for benzos.  CT head showed no acute abnormality.  MRI brain showed minor deep white matter ischemic changes noted in the left cerebrum.  Patient was given 2 L of NS, thiamine 500 mg IV x2, Zofran 8 mg IV, Reglan 5 mg IV, Ativan 1 mg IV, diazepam 10 mg IV, vitamin B12 1000 mcg IM, and Librium 25 mg p.o. x1.  He was admitted for further evaluation of possible Wernicke's encephalopathy based on MRI results.    * No surgery found *      Hospital Course:        Patient has been seen and examinated,patient with a past medical history of COPD, alcohol use, depression, anxiety, who presents with gait  "instability. Duo to alcohol withdraw,has tremor,increased Valium,started on IVF ,continue with vitamins and consulted PT,OT   Still has Tremors,will continue with high dose valium and IVF.      Unfortunately patient decided leave AMA,consequence with possible worsening symptoms  and possible death was explained,patient understand consulting,but patient still signed AMA and left AMA.     Goals of Care Treatment Preferences:  Code Status: Full Code      Consults:   Consults (From admission, onward)          Status Ordering Provider     Inpatient consult to Neurology  Once        Provider:  Dillan Nam MD    Completed JOSETTE GALLAGHER            No new Assessment & Plan notes have been filed under this hospital service since the last note was generated.  Service: Hospital Medicine    Final Active Diagnoses:    Diagnosis Date Noted POA    PRINCIPAL PROBLEM:  Alcohol withdrawal [F10.939] 12/17/2023 Yes    Abnormal brain MRI [R90.89] 03/15/2024 Yes    Tobacco abuse [Z72.0] 12/17/2023 Yes    Major depressive disorder with current active episode [F32.9] 12/17/2023 Yes    Stage 2 moderate COPD by GOLD classification [J44.9] 07/07/2023 Yes      Problems Resolved During this Admission:       Discharged Condition: AMA     Disposition: Left Against Medical Adv*    Follow Up:    Patient Instructions:   No discharge procedures on file.    Significant Diagnostic Studies: Labs: BMP: No results for input(s): "GLU", "NA", "K", "CL", "CO2", "BUN", "CREATININE", "CALCIUM", "MG" in the last 48 hours., CMP No results for input(s): "NA", "K", "CL", "CO2", "GLU", "BUN", "CREATININE", "CALCIUM", "PROT", "ALBUMIN", "BILITOT", "ALKPHOS", "AST", "ALT", "ANIONGAP", "ESTGFRAFRICA", "EGFRNONAA" in the last 48 hours., and CBC No results for input(s): "WBC", "HGB", "HCT", "PLT" in the last 48 hours.  Radiology: X-Ray: CXR: X-Ray Chest 1 View (CXR): No results found for this visit on 03/14/24. and X-Ray Chest PA and Lateral (CXR): No results found " for this visit on 03/14/24.    Pending Diagnostic Studies:       Procedure Component Value Units Date/Time    Stool Exam-Ova,Cysts,Parasites [[id number]] Collected: 03/15/24 0753    Order Status: Sent Lab Status: In process Updated: 03/15/24 0817    Specimen: Stool     Vitamin B1 [[id number]] Collected: 03/16/24 0613    Order Status: Sent Lab Status: In process Updated: 03/16/24 0622    Specimen: Blood     Narrative:      Collection has been rescheduled by CMO at 03/15/2024 15:16 Reason:   Patient unavailable  Collection has been rescheduled by CMO at 03/15/2024 17:53 Reason: Pt   non fasting Rn Nikki gilliland draw in AM           Medications:  Reconciled Home Medications:      Medication List        ASK your doctor about these medications      albuterol 90 mcg/actuation inhaler  Commonly known as: PROVENTIL/VENTOLIN HFA  Inhale 2 puffs into the lungs every 4 (four) hours as needed for Wheezing or Shortness of Breath.     ARIPiprazole 5 MG Tab  Commonly known as: ABILIFY  Take 1 tablet (5 mg total) by mouth once daily.     cetirizine 10 MG tablet  Commonly known as: ZYRTEC  Take 1 tablet (10 mg total) by mouth once daily.     EScitalopram oxalate 20 MG tablet  Commonly known as: LEXAPRO  Take 1 tablet (20 mg total) by mouth once daily.     famotidine 20 MG tablet  Commonly known as: PEPCID  Take 1 tablet (20 mg total) by mouth 2 (two) times daily.     fluticasone propionate 110 mcg/actuation inhaler  Commonly known as: FLOVENT HFA  Inhale 2 puffs into the lungs 2 (two) times daily. Controller     hydrOXYzine pamoate 25 MG Cap  Commonly known as: VISTARIL  Take 1 capsule (25 mg total) by mouth every 6 (six) hours as needed (anxiety).     LORazepam 0.5 MG tablet  Commonly known as: ATIVAN  Take 1 tablet (0.5 mg total) by mouth 2 (two) times daily for 2 days, THEN 1 tablet (0.5 mg total) once daily for 2 days.  Start taking on: January 26, 2024     meclizine 25 mg tablet  Commonly known as: ANTIVERT  Take 1 tablet (25 mg  total) by mouth 3 (three) times daily as needed for Dizziness.     ondansetron 8 MG tablet  Commonly known as: ZOFRAN  Take 1 tablet (8 mg total) by mouth every 8 (eight) hours as needed for Nausea.     rOPINIRole 1 MG tablet  Commonly known as: REQUIP  Take 1 tablet (1 mg total) by mouth every evening.     thiamine 100 MG tablet  Take 1 tablet (100 mg total) by mouth once daily.     tiotropium-olodateroL 2.5-2.5 mcg/actuation Mist  Commonly known as: STIOLTO RESPIMAT  Inhale 2 puffs into the lungs once daily. Controller     traZODone 100 MG tablet  Commonly known as: DESYREL  Take 2 tablets (200 mg total) by mouth every evening.              Indwelling Lines/Drains at time of discharge:   Lines/Drains/Airways       None                   Time spent on the discharge of patient:  over 30  minutes         Lincoln Calle MD  Department of Hospital Medicine  [facility] - [place] - Telemetry

## 2024-03-18 LAB
BACTERIA STL CULT: NORMAL
OHS QRS DURATION: 90 MS
OHS QTC CALCULATION: 492 MS

## 2024-03-19 LAB — O+P STL MICRO: NORMAL

## 2024-03-21 LAB — VIT B1 BLD-MCNC: 72 UG/L (ref 38–122)

## 2024-03-22 ENCOUNTER — TELEPHONE (OUTPATIENT)
Dept: NEUROLOGY | Facility: CLINIC | Age: 54
End: 2024-03-22
Payer: MEDICAID

## 2024-03-22 NOTE — TELEPHONE ENCOUNTER
Called patient to discuss his results of CTA head and neck that was obtained during his inpatient admission on 03/15/2024.  Noted lung lesion has been seen and evaluated in prior imaging as well.  All questions answered during this phone call.    Dillan Nam MD   Risk factors include alleged suicidal threat today; alleged aggression today, medication noncompliance despite diagnosis of Schizophrenia, impulsivity, impaired insight & judgement. This patient is at high risk for harm and requires inpatient level of care for safety and stabilization.

## 2024-03-22 NOTE — TELEPHONE ENCOUNTER
----- Message from Jennie Montana MA sent at 3/19/2024  4:01 PM CDT -----  Regarding: FW: Self [phone]    ----- Message -----  From: Wayne Matt  Sent: 3/19/2024   3:55 PM CDT  To: Cyril Grimaldo Staff  Subject: Self [phone]                                Type:  Test Results    Who Called:  Self     Name of Test (Lab/Mammo/Etc):  CTA Head and Neck (xpd) [[id number]]    Date of Test:  03/15/2024     Where the test was performed: [facility]     Would the patient rather a call back or a response via [facility]?  Call back.     Best Call Back Number:  [phone]     Additional Information:      For Clinical Team:Has the provider reviewed the results?

## 2024-06-06 ENCOUNTER — HOSPITAL ENCOUNTER (INPATIENT)
Facility: HOSPITAL | Age: 54
LOS: 5 days | Discharge: PSYCHIATRIC OR SUBSTANCE USE FACILITY | DRG: 897 | End: 2024-06-11
Attending: EMERGENCY MEDICINE | Admitting: STUDENT IN AN ORGANIZED HEALTH CARE EDUCATION/TRAINING PROGRAM
Payer: MEDICAID

## 2024-06-06 DIAGNOSIS — J44.9 CHRONIC OBSTRUCTIVE PULMONARY DISEASE, UNSPECIFIED COPD TYPE: ICD-10-CM

## 2024-06-06 DIAGNOSIS — F17.200 NICOTINE DEPENDENCE WITH CURRENT USE: ICD-10-CM

## 2024-06-06 DIAGNOSIS — R00.0 TACHYCARDIA: ICD-10-CM

## 2024-06-06 DIAGNOSIS — F10.939 ALCOHOL WITHDRAWAL SYNDROME WITH COMPLICATION: Primary | ICD-10-CM

## 2024-06-06 DIAGNOSIS — R26.81 UNSTEADY GAIT: ICD-10-CM

## 2024-06-06 DIAGNOSIS — F10.939 ALCOHOL WITHDRAWAL: ICD-10-CM

## 2024-06-06 DIAGNOSIS — R07.9 CHEST PAIN: ICD-10-CM

## 2024-06-06 DIAGNOSIS — F33.2 SEVERE EPISODE OF RECURRENT MAJOR DEPRESSIVE DISORDER, WITHOUT PSYCHOTIC FEATURES: ICD-10-CM

## 2024-06-06 DIAGNOSIS — R29.6 REPEATED FALLS: ICD-10-CM

## 2024-06-06 PROBLEM — F32.9 MAJOR DEPRESSIVE DISORDER: Status: ACTIVE | Noted: 2024-06-06

## 2024-06-06 PROBLEM — F32.9 MAJOR DEPRESSIVE DISORDER WITH CURRENT ACTIVE EPISODE: Status: RESOLVED | Noted: 2023-12-17 | Resolved: 2024-06-06

## 2024-06-06 LAB
ALBUMIN SERPL BCP-MCNC: 4.7 G/DL (ref 3.5–5.2)
ALP SERPL-CCNC: 69 U/L (ref 55–135)
ALT SERPL W/O P-5'-P-CCNC: 30 U/L (ref 10–44)
AMPHET+METHAMPHET UR QL: NEGATIVE
ANION GAP SERPL CALC-SCNC: 29 MMOL/L (ref 8–16)
AST SERPL-CCNC: 35 U/L (ref 10–40)
BARBITURATES UR QL SCN>200 NG/ML: NEGATIVE
BASOPHILS # BLD AUTO: 0.05 K/UL (ref 0–0.2)
BASOPHILS NFR BLD: 0.5 % (ref 0–1.9)
BENZODIAZ UR QL SCN>200 NG/ML: ABNORMAL
BILIRUB SERPL-MCNC: 1.9 MG/DL (ref 0.1–1)
BNP SERPL-MCNC: 12 PG/ML (ref 0–99)
BUN SERPL-MCNC: 17 MG/DL (ref 6–20)
BZE UR QL SCN: NEGATIVE
CALCIUM SERPL-MCNC: 10.1 MG/DL (ref 8.7–10.5)
CANNABINOIDS UR QL SCN: NEGATIVE
CHLORIDE SERPL-SCNC: 93 MMOL/L (ref 95–110)
CO2 SERPL-SCNC: 15 MMOL/L (ref 23–29)
CREAT SERPL-MCNC: 1.3 MG/DL (ref 0.5–1.4)
CREAT UR-MCNC: 227.4 MG/DL (ref 23–375)
DIFFERENTIAL METHOD BLD: ABNORMAL
EOSINOPHIL # BLD AUTO: 0 K/UL (ref 0–0.5)
EOSINOPHIL NFR BLD: 0.2 % (ref 0–8)
ERYTHROCYTE [DISTWIDTH] IN BLOOD BY AUTOMATED COUNT: 15.4 % (ref 11.5–14.5)
EST. GFR  (NO RACE VARIABLE): >60 ML/MIN/1.73 M^2
ESTIMATED AVG GLUCOSE: 103 MG/DL (ref 68–131)
ETHANOL SERPL-MCNC: <10 MG/DL
FOLATE SERPL-MCNC: >40 NG/ML (ref 4–24)
GLUCOSE SERPL-MCNC: 181 MG/DL (ref 70–110)
HBA1C MFR BLD: 5.2 % (ref 4–5.6)
HCT VFR BLD AUTO: 44.3 % (ref 40–54)
HGB BLD-MCNC: 15 G/DL (ref 14–18)
IMM GRANULOCYTES # BLD AUTO: 0.03 K/UL (ref 0–0.04)
IMM GRANULOCYTES NFR BLD AUTO: 0.3 % (ref 0–0.5)
LIPASE SERPL-CCNC: 12 U/L (ref 4–60)
LYMPHOCYTES # BLD AUTO: 0.6 K/UL (ref 1–4.8)
LYMPHOCYTES NFR BLD: 6.8 % (ref 18–48)
MCH RBC QN AUTO: 31.2 PG (ref 27–31)
MCHC RBC AUTO-ENTMCNC: 33.9 G/DL (ref 32–36)
MCV RBC AUTO: 92 FL (ref 82–98)
METHADONE UR QL SCN>300 NG/ML: NEGATIVE
MONOCYTES # BLD AUTO: 0.6 K/UL (ref 0.3–1)
MONOCYTES NFR BLD: 5.8 % (ref 4–15)
NEUTROPHILS # BLD AUTO: 8.2 K/UL (ref 1.8–7.7)
NEUTROPHILS NFR BLD: 86.4 % (ref 38–73)
NRBC BLD-RTO: 0 /100 WBC
OHS QRS DURATION: 86 MS
OHS QTC CALCULATION: 455 MS
OPIATES UR QL SCN: NEGATIVE
PCP UR QL SCN>25 NG/ML: NEGATIVE
PLATELET # BLD AUTO: 216 K/UL (ref 150–450)
PMV BLD AUTO: 9.6 FL (ref 9.2–12.9)
POTASSIUM SERPL-SCNC: 4 MMOL/L (ref 3.5–5.1)
PROT SERPL-MCNC: 8.3 G/DL (ref 6–8.4)
RBC # BLD AUTO: 4.81 M/UL (ref 4.6–6.2)
SODIUM SERPL-SCNC: 137 MMOL/L (ref 136–145)
TOXICOLOGY INFORMATION: ABNORMAL
TROPONIN I SERPL DL<=0.01 NG/ML-MCNC: <0.006 NG/ML (ref 0–0.03)
TROPONIN I SERPL DL<=0.01 NG/ML-MCNC: <0.006 NG/ML (ref 0–0.03)
VIT B12 SERPL-MCNC: 484 PG/ML (ref 210–950)
WBC # BLD AUTO: 9.48 K/UL (ref 3.9–12.7)

## 2024-06-06 PROCEDURE — 25000003 PHARM REV CODE 250: Performed by: STUDENT IN AN ORGANIZED HEALTH CARE EDUCATION/TRAINING PROGRAM

## 2024-06-06 PROCEDURE — 96375 TX/PRO/DX INJ NEW DRUG ADDON: CPT

## 2024-06-06 PROCEDURE — 96365 THER/PROPH/DIAG IV INF INIT: CPT

## 2024-06-06 PROCEDURE — 83036 HEMOGLOBIN GLYCOSYLATED A1C: CPT | Performed by: PHYSICIAN ASSISTANT

## 2024-06-06 PROCEDURE — 63600175 PHARM REV CODE 636 W HCPCS: Performed by: STUDENT IN AN ORGANIZED HEALTH CARE EDUCATION/TRAINING PROGRAM

## 2024-06-06 PROCEDURE — 83690 ASSAY OF LIPASE: CPT | Performed by: EMERGENCY MEDICINE

## 2024-06-06 PROCEDURE — 80053 COMPREHEN METABOLIC PANEL: CPT | Performed by: EMERGENCY MEDICINE

## 2024-06-06 PROCEDURE — 25000003 PHARM REV CODE 250: Performed by: EMERGENCY MEDICINE

## 2024-06-06 PROCEDURE — 82077 ASSAY SPEC XCP UR&BREATH IA: CPT | Performed by: EMERGENCY MEDICINE

## 2024-06-06 PROCEDURE — 83880 ASSAY OF NATRIURETIC PEPTIDE: CPT | Performed by: EMERGENCY MEDICINE

## 2024-06-06 PROCEDURE — 93005 ELECTROCARDIOGRAM TRACING: CPT

## 2024-06-06 PROCEDURE — 82746 ASSAY OF FOLIC ACID SERUM: CPT | Performed by: PHYSICIAN ASSISTANT

## 2024-06-06 PROCEDURE — 99900035 HC TECH TIME PER 15 MIN (STAT)

## 2024-06-06 PROCEDURE — 36415 COLL VENOUS BLD VENIPUNCTURE: CPT | Performed by: PHYSICIAN ASSISTANT

## 2024-06-06 PROCEDURE — 11000001 HC ACUTE MED/SURG PRIVATE ROOM

## 2024-06-06 PROCEDURE — 99285 EMERGENCY DEPT VISIT HI MDM: CPT | Mod: 25

## 2024-06-06 PROCEDURE — 93010 ELECTROCARDIOGRAM REPORT: CPT | Mod: ,,, | Performed by: INTERNAL MEDICINE

## 2024-06-06 PROCEDURE — 25000003 PHARM REV CODE 250: Performed by: PHYSICIAN ASSISTANT

## 2024-06-06 PROCEDURE — 63600175 PHARM REV CODE 636 W HCPCS: Performed by: EMERGENCY MEDICINE

## 2024-06-06 PROCEDURE — 85025 COMPLETE CBC W/AUTO DIFF WBC: CPT | Performed by: EMERGENCY MEDICINE

## 2024-06-06 PROCEDURE — 84425 ASSAY OF VITAMIN B-1: CPT | Performed by: PHYSICIAN ASSISTANT

## 2024-06-06 PROCEDURE — 99222 1ST HOSP IP/OBS MODERATE 55: CPT | Mod: ,,, | Performed by: PHYSICIAN ASSISTANT

## 2024-06-06 PROCEDURE — 94760 N-INVAS EAR/PLS OXIMETRY 1: CPT

## 2024-06-06 PROCEDURE — 82607 VITAMIN B-12: CPT | Performed by: PHYSICIAN ASSISTANT

## 2024-06-06 PROCEDURE — 94761 N-INVAS EAR/PLS OXIMETRY MLT: CPT

## 2024-06-06 PROCEDURE — 84207 ASSAY OF VITAMIN B-6: CPT | Performed by: PHYSICIAN ASSISTANT

## 2024-06-06 PROCEDURE — S4991 NICOTINE PATCH NONLEGEND: HCPCS | Performed by: PHYSICIAN ASSISTANT

## 2024-06-06 PROCEDURE — 96367 TX/PROPH/DG ADDL SEQ IV INF: CPT

## 2024-06-06 PROCEDURE — 80307 DRUG TEST PRSMV CHEM ANLYZR: CPT | Performed by: EMERGENCY MEDICINE

## 2024-06-06 PROCEDURE — 96368 THER/DIAG CONCURRENT INF: CPT

## 2024-06-06 PROCEDURE — 84484 ASSAY OF TROPONIN QUANT: CPT | Mod: 91 | Performed by: EMERGENCY MEDICINE

## 2024-06-06 RX ORDER — LOPERAMIDE HYDROCHLORIDE 2 MG/1
2 CAPSULE ORAL 4 TIMES DAILY PRN
Status: DISCONTINUED | OUTPATIENT
Start: 2024-06-06 | End: 2024-06-11 | Stop reason: HOSPADM

## 2024-06-06 RX ORDER — DIAZEPAM 10 MG/1
10 TABLET ORAL EVERY 6 HOURS
Status: DISCONTINUED | OUTPATIENT
Start: 2024-06-06 | End: 2024-06-08

## 2024-06-06 RX ORDER — ESCITALOPRAM OXALATE 20 MG/1
20 TABLET ORAL DAILY
Status: DISCONTINUED | OUTPATIENT
Start: 2024-06-06 | End: 2024-06-11 | Stop reason: HOSPADM

## 2024-06-06 RX ORDER — LIDOCAINE HYDROCHLORIDE 20 MG/ML
15 SOLUTION OROPHARYNGEAL ONCE
Status: COMPLETED | OUTPATIENT
Start: 2024-06-06 | End: 2024-06-06

## 2024-06-06 RX ORDER — SODIUM CHLORIDE 0.9 % (FLUSH) 0.9 %
10 SYRINGE (ML) INJECTION
Status: DISCONTINUED | OUTPATIENT
Start: 2024-06-06 | End: 2024-06-11 | Stop reason: HOSPADM

## 2024-06-06 RX ORDER — ONDANSETRON HYDROCHLORIDE 2 MG/ML
4 INJECTION, SOLUTION INTRAVENOUS EVERY 6 HOURS PRN
Status: DISCONTINUED | OUTPATIENT
Start: 2024-06-06 | End: 2024-06-11 | Stop reason: HOSPADM

## 2024-06-06 RX ORDER — ALUMINUM HYDROXIDE, MAGNESIUM HYDROXIDE, AND SIMETHICONE 2400; 240; 2400 MG/30ML; MG/30ML; MG/30ML
30 SUSPENSION ORAL EVERY 6 HOURS PRN
Status: DISCONTINUED | OUTPATIENT
Start: 2024-06-06 | End: 2024-06-11 | Stop reason: HOSPADM

## 2024-06-06 RX ORDER — ROPINIROLE 1 MG/1
1 TABLET, FILM COATED ORAL NIGHTLY
Status: DISCONTINUED | OUTPATIENT
Start: 2024-06-06 | End: 2024-06-10

## 2024-06-06 RX ORDER — SYRING-NEEDL,DISP,INSUL,0.3 ML 29 G X1/2"
296 SYRINGE, EMPTY DISPOSABLE MISCELLANEOUS
Status: DISCONTINUED | OUTPATIENT
Start: 2024-06-06 | End: 2024-06-06

## 2024-06-06 RX ORDER — IBUPROFEN 200 MG
1 TABLET ORAL DAILY
Status: DISCONTINUED | OUTPATIENT
Start: 2024-06-06 | End: 2024-06-08

## 2024-06-06 RX ORDER — PROCHLORPERAZINE EDISYLATE 5 MG/ML
2.5 INJECTION INTRAMUSCULAR; INTRAVENOUS EVERY 6 HOURS PRN
Status: DISCONTINUED | OUTPATIENT
Start: 2024-06-06 | End: 2024-06-11 | Stop reason: HOSPADM

## 2024-06-06 RX ORDER — FLUTICASONE PROPIONATE 110 UG/1
2 AEROSOL, METERED RESPIRATORY (INHALATION) 2 TIMES DAILY
Status: DISCONTINUED | OUTPATIENT
Start: 2024-06-06 | End: 2024-06-06

## 2024-06-06 RX ORDER — MAGNESIUM SULFATE HEPTAHYDRATE 40 MG/ML
2 INJECTION, SOLUTION INTRAVENOUS
Status: COMPLETED | OUTPATIENT
Start: 2024-06-06 | End: 2024-06-06

## 2024-06-06 RX ORDER — ALUMINUM HYDROXIDE, MAGNESIUM HYDROXIDE, AND SIMETHICONE 1200; 120; 1200 MG/30ML; MG/30ML; MG/30ML
30 SUSPENSION ORAL ONCE
Status: COMPLETED | OUTPATIENT
Start: 2024-06-06 | End: 2024-06-06

## 2024-06-06 RX ORDER — LORAZEPAM 2 MG/ML
2 INJECTION INTRAMUSCULAR EVERY 6 HOURS PRN
Status: DISCONTINUED | OUTPATIENT
Start: 2024-06-06 | End: 2024-06-08

## 2024-06-06 RX ORDER — LORAZEPAM 2 MG/ML
1 INJECTION INTRAMUSCULAR
Status: COMPLETED | OUTPATIENT
Start: 2024-06-06 | End: 2024-06-06

## 2024-06-06 RX ORDER — TALC
6 POWDER (GRAM) TOPICAL NIGHTLY PRN
Status: DISCONTINUED | OUTPATIENT
Start: 2024-06-06 | End: 2024-06-11 | Stop reason: HOSPADM

## 2024-06-06 RX ORDER — DIAZEPAM 10 MG/2ML
5 INJECTION INTRAMUSCULAR
Status: COMPLETED | OUTPATIENT
Start: 2024-06-06 | End: 2024-06-06

## 2024-06-06 RX ORDER — DIAZEPAM 10 MG/2ML
5 INJECTION INTRAMUSCULAR
Status: DISCONTINUED | OUTPATIENT
Start: 2024-06-06 | End: 2024-06-06

## 2024-06-06 RX ORDER — FLUTICASONE PROPIONATE 110 UG/1
2 AEROSOL, METERED RESPIRATORY (INHALATION) 2 TIMES DAILY
Status: DISCONTINUED | OUTPATIENT
Start: 2024-06-07 | End: 2024-06-07

## 2024-06-06 RX ORDER — ARIPIPRAZOLE 5 MG/1
5 TABLET ORAL DAILY
Status: DISCONTINUED | OUTPATIENT
Start: 2024-06-06 | End: 2024-06-08

## 2024-06-06 RX ORDER — DIAZEPAM 10 MG/2ML
10 INJECTION INTRAMUSCULAR
Status: COMPLETED | OUTPATIENT
Start: 2024-06-06 | End: 2024-06-06

## 2024-06-06 RX ADMIN — ASCORBIC ACID, VITAMIN A PALMITATE, CHOLECALCIFEROL, THIAMINE HYDROCHLORIDE, RIBOFLAVIN-5 PHOSPHATE SODIUM, PYRIDOXINE HYDROCHLORIDE, NIACINAMIDE, DEXPANTHENOL, ALPHA-TOCOPHEROL ACETATE, VITAMIN K1, FOLIC ACID, BIOTIN, CYANOCOBALAMIN: 200; 3300; 200; 6; 3.6; 6; 40; 15; 10; 150; 600; 60; 5 INJECTION, SOLUTION INTRAVENOUS at 05:06

## 2024-06-06 RX ADMIN — DIAZEPAM 10 MG: 10 TABLET ORAL at 11:06

## 2024-06-06 RX ADMIN — ONDANSETRON 4 MG: 2 INJECTION INTRAMUSCULAR; INTRAVENOUS at 11:06

## 2024-06-06 RX ADMIN — NICOTINE 1 PATCH: 14 PATCH, EXTENDED RELEASE TRANSDERMAL at 11:06

## 2024-06-06 RX ADMIN — ROPINIROLE HYDROCHLORIDE 1 MG: 1 TABLET, FILM COATED ORAL at 08:06

## 2024-06-06 RX ADMIN — MAGNESIUM SULFATE HEPTAHYDRATE 2 G: 40 INJECTION, SOLUTION INTRAVENOUS at 06:06

## 2024-06-06 RX ADMIN — LIDOCAINE HYDROCHLORIDE 15 ML: 20 SOLUTION ORAL at 08:06

## 2024-06-06 RX ADMIN — DIAZEPAM 5 MG: 5 INJECTION, SOLUTION INTRAMUSCULAR; INTRAVENOUS at 07:06

## 2024-06-06 RX ADMIN — METOPROLOL SUCCINATE 12.5 MG: 25 TABLET, EXTENDED RELEASE ORAL at 03:06

## 2024-06-06 RX ADMIN — ALUMINUM HYDROXIDE, MAGNESIUM HYDROXIDE, AND DIMETHICONE 30 ML: 400; 400; 40 SUSPENSION ORAL at 04:06

## 2024-06-06 RX ADMIN — ESCITALOPRAM 20 MG: 20 TABLET, FILM COATED ORAL at 11:06

## 2024-06-06 RX ADMIN — ONDANSETRON 4 MG: 2 INJECTION INTRAMUSCULAR; INTRAVENOUS at 05:06

## 2024-06-06 RX ADMIN — METOPROLOL SUCCINATE 12.5 MG: 25 TABLET, EXTENDED RELEASE ORAL at 06:06

## 2024-06-06 RX ADMIN — DIAZEPAM 10 MG: 5 INJECTION, SOLUTION INTRAMUSCULAR; INTRAVENOUS at 06:06

## 2024-06-06 RX ADMIN — FOLIC ACID 1 MG: 5 INJECTION, SOLUTION INTRAMUSCULAR; INTRAVENOUS; SUBCUTANEOUS at 05:06

## 2024-06-06 RX ADMIN — LORAZEPAM 1 MG: 2 INJECTION INTRAMUSCULAR; INTRAVENOUS at 04:06

## 2024-06-06 RX ADMIN — THIAMINE HYDROCHLORIDE 100 MG: 100 INJECTION, SOLUTION INTRAMUSCULAR; INTRAVENOUS at 05:06

## 2024-06-06 RX ADMIN — ARIPIPRAZOLE 5 MG: 5 TABLET ORAL at 11:06

## 2024-06-06 RX ADMIN — ALUMINUM HYDROXIDE, MAGNESIUM HYDROXIDE, AND SIMETHICONE 30 ML: 1200; 120; 1200 SUSPENSION ORAL at 08:06

## 2024-06-06 RX ADMIN — DIAZEPAM 10 MG: 10 TABLET ORAL at 06:06

## 2024-06-06 RX ADMIN — LOPERAMIDE HYDROCHLORIDE 2 MG: 2 CAPSULE ORAL at 09:06

## 2024-06-06 NOTE — ED PROVIDER NOTES
E  History     Chief Complaint   Patient presents with    Alcohol Problem     Pt to ED via [facility] for generalized body shaking and N/V after he abruptly stopped drinking ~ 20 hours ago. Patient reports drinks 1.75 L vodka daily.      Pt to ED via [facility] for generalized body shaking and N/V after he abruptly stopped drinking ~ 20 hours ago. Patient reports drinks 1.75 L vodka daily.  Patient also states that he has unsteady gait gets confused at times for last 2 weeks    The history is provided by the patient.   Drug / Alcohol Assessment  Primary symptoms include confusion.   Primary symptoms include no weakness, no agitation, no hallucinations, no self-injury. Suspected agents include alcohol. Associated symptoms include nausea and vomiting. Pertinent negatives include no fever. Associated medical issues include addiction treatment and psychiatric history.     Review of patient's allergies indicates:   Allergen Reactions    Diphenoxylate-atropine Shortness Of Breath and Other (See Comments)     Esophagus tightens     Past Medical History:   Diagnosis Date    Addiction to drug     Alcohol abuse     last drink 9/06/22    Anxiety     Bipolar disorder     COPD (chronic obstructive pulmonary disease)     Depression     Disc disorder     Fatigue     Headache     History of psychiatric hospitalization     Hx of psychiatric care     Panic disorder     Psychiatric problem     PTSD (post-traumatic stress disorder)     Sleep difficulties     Suicide attempt     Therapy     Withdrawal symptoms, alcohol      Past Surgical History:   Procedure Laterality Date    APPENDECTOMY      COLONOSCOPY      polyps    HERNIA REPAIR      x 2     Family History   Problem Relation Name Age of Onset    Anxiety disorder Mother Rosa M     Depression Mother Rosa M     Heart block Father Maicol     No Known Problems Sister Melva     No Known Problems Brother Francisco     Alcohol abuse Maternal Grandfather      Alcohol  abuse Paternal Grandfather       Social History     Tobacco Use    Smoking status: Every Day     Current packs/day: 1.00     Average packs/day: 1 pack/day for 38.4 years (38.4 ttl pk-yrs)     Types: Cigarettes     Start date: 1/8/1986    Smokeless tobacco: Never   Substance Use Topics    Alcohol use: Yes     Comment: daily drinker--alcohol abuse    Drug use: Not Currently     Types: Marijuana     Review of Systems   Constitutional:  Negative for fever.   HENT:  Negative for sore throat.    Respiratory:  Negative for shortness of breath.    Cardiovascular:  Negative for chest pain.   Gastrointestinal:  Positive for nausea and vomiting.   Genitourinary:  Negative for dysuria.   Musculoskeletal:  Positive for gait problem and myalgias. Negative for back pain.   Skin:  Negative for rash.   Neurological:  Positive for dizziness, syncope, light-headedness and numbness. Negative for weakness.   Hematological:  Does not bruise/bleed easily.   Psychiatric/Behavioral:  Positive for confusion. Negative for agitation, hallucinations and self-injury.        Physical Exam     Initial Vitals [06/06/24 0440]   BP Pulse Resp Temp SpO2   (!) 160/100 (!) 122 19 98.1 °F (36.7 °C) 96 %      MAP       --         Physical Exam    Constitutional: He appears well-developed and well-nourished.   Very anxious and tremulous   HENT:   Head: Normocephalic and atraumatic.   Eyes: EOM are normal. Pupils are equal, round, and reactive to light.   Neck: Neck supple.   Normal range of motion.  Cardiovascular:  Regular rhythm.   Tachycardia present.   Exam reveals no gallop.       No murmur heard.  Pulmonary/Chest: Breath sounds normal.   Abdominal: Abdomen is soft. Bowel sounds are normal.   Musculoskeletal:         General: Normal range of motion.      Cervical back: Normal range of motion and neck supple.      Comments: Tremors/shakes bilateral upper extremity     Neurological: He is alert and oriented to person, place, and time. He has normal  strength. A cranial nerve deficit is present.   Skin: Skin is warm.         ED Course   Procedures  Labs Reviewed   CBC W/ AUTO DIFFERENTIAL - Abnormal; Notable for the following components:       Result Value    MCH 31.2 (*)     RDW 15.4 (*)     Gran # (ANC) 8.2 (*)     Lymph # 0.6 (*)     Gran % 86.4 (*)     Lymph % 6.8 (*)     All other components within normal limits   COMPREHENSIVE METABOLIC PANEL - Abnormal; Notable for the following components:    Chloride 93 (*)     CO2 15 (*)     Glucose 181 (*)     Total Bilirubin 1.9 (*)     Anion Gap 29 (*)     All other components within normal limits   DRUG SCREEN PANEL, URINE EMERGENCY - Abnormal; Notable for the following components:    Benzodiazepines Presumptive Positive (*)     All other components within normal limits    Narrative:     Specimen Source->Urine   TROPONIN I   B-TYPE NATRIURETIC PEPTIDE   LIPASE   ALCOHOL,MEDICAL (ETHANOL)   TROPONIN I     EKG Readings: (Independently Interpreted)   Ectopy: No Ectopy. Axis: Indeterminate.   Sinus tachycardia with ventricular rate of 119, no ectopy no acute ST changes     ECG Results              EKG 12-lead (Final result)        Collection Time Result Time QRS Duration OHS QTC Calculation    06/06/24 04:49:13 06/06/24 08:00:44 86 455                     Final result by Interface, Lab In [facility] (06/06/24 08:00:51)                   Narrative:    Test Reason : R07.9    Vent. Rate : 119 BPM     Atrial Rate : 119 BPM     P-R Int : 130 ms          QRS Dur : 086 ms      QT Int : 324 ms       P-R-T Axes : 084 090 084 degrees     QTc Int : 455 ms    Sinus tachycardia  Rightward axis  Abnormal R wave progression  Cannot rule out Anterior infarct ,age undetermined  Abnormal ECG  When compared with ECG of 14-MAR-2024 15:10,  Vent. rate has increased BY  41 BPM  Minimal criteria for Anterior infarct are now Present  T wave amplitude has increased in Anterior leads  Repeat ECG with appropriate lead placement if clinically  indicated  Confirmed by LINDA DURAN MD ([id number]) on 6/6/2024 8:00:41 AM    Referred By: [email or web address]   SELF           Confirmed By:LINDA DURAN MD                                  Imaging Results              CT Head Without Contrast (Final result)  Result time 06/06/24 08:01:12      Final result by Renate Daley MD (06/06/24 08:01:12)                   Impression:      No acute abnormality.      Electronically signed by: Renate Daley MD  Date:    06/06/2024  Time:    08:01               Narrative:    EXAMINATION:  CT HEAD WITHOUT CONTRAST    CLINICAL HISTORY:  Dizziness, persistent/recurrent, cardiac or vascular cause suspected;Syncope, recurrent;    TECHNIQUE:  Low dose axial CT images obtained throughout the head without intravenous contrast. Sagittal and coronal reconstructions were performed.    COMPARISON:  03/15/2024    FINDINGS:  Intracranial compartment:    Ventricles and sulci are normal in size for age without evidence of hydrocephalus. No extra-axial blood or fluid collections.    The brain parenchyma appears normal. No parenchymal mass, hemorrhage, edema or major vascular distribution infarct.    Skull/extracranial contents (limited evaluation): No fracture. Mastoid air cells are clear.Paranasal sinuses are essentially clear.                                       X-Ray Chest AP Portable (Final result)  Result time 06/06/24 08:08:51   Procedure changed from X-Ray Chest PA And Lateral     Final result by Renate Daley MD (06/06/24 08:08:51)                   Impression:      No acute abnormality.      Electronically signed by: Renate Daley MD  Date:    06/06/2024  Time:    08:08               Narrative:    EXAMINATION:  XR CHEST AP PORTABLE    CLINICAL HISTORY:  Chest Pain;    TECHNIQUE:  Single frontal view of the chest was performed.    COMPARISON:  12/16/2023    FINDINGS:  The lungs are clear with normal appearance of pulmonary vasculature. No pleural effusion. No evident  pneumothorax.    The cardiac silhouette is normal in size. The hilar and mediastinal contours are unremarkable.    Bones are intact.                                       Medications   sodium chloride 0.9% flush 10 mL (has no administration in time range)   melatonin tablet 6 mg (has no administration in time range)   diazePAM tablet 10 mg (10 mg Oral Given 6/6/24 1812)   LORazepam injection 2 mg (has no administration in time range)   EScitalopram oxalate tablet 20 mg (20 mg Oral Given 6/6/24 1150)   ARIPiprazole tablet 5 mg (5 mg Oral Given 6/6/24 1150)   rOPINIRole tablet 1 mg (1 mg Oral Given 6/6/24 2050)   tiotropium-olodateroL 2.5-2.5 mcg/actuation Mist 2 puff (has no administration in time range)   fluticasone propionate 110 mcg/actuation inhaler 2 puff (has no administration in time range)   nicotine 14 mg/24 hr 1 patch (1 patch Transdermal Patch Applied 6/6/24 1150)   aluminum & magnesium hydroxide-simethicone 400-400-40 mg/5 mL suspension 30 mL (30 mLs Oral Given 6/6/24 1645)   ondansetron injection 4 mg (4 mg Intravenous Given 6/6/24 1715)   prochlorperazine injection Soln 2.5 mg (has no administration in time range)   loperamide capsule 2 mg (2 mg Oral Given 6/6/24 2142)   LORazepam injection 1 mg (1 mg Intravenous Given 6/6/24 0450)   sodium chloride 0.9% 1,000 mL with mvi, (ADULT) no.4 with vit K 3,300 unit- 150 mcg/10 mL 10 mL infusion ( Intravenous New Bag 6/6/24 0503)     And   folic acid 1 mg in sodium chloride 0.9% 100 mL IVPB (0 mg Intravenous Stopped 6/6/24 0601)     And   thiamine (B-1) 100 mg in dextrose 5 % (D5W) 100 mL IVPB (0 mg Intravenous Stopped 6/6/24 0602)   magnesium sulfate 2g in water 50mL IVPB (premix) (0 g Intravenous Stopped 6/6/24 0829)   diazePAM injection 10 mg (10 mg Intravenous Given 6/6/24 0607)   diazePAM injection 5 mg (5 mg Intravenous Given 6/6/24 0757)   aluminum-magnesium hydroxide-simethicone 200-200-20 mg/5 mL suspension 30 mL (30 mLs Oral Given 6/6/24 2019)     And    LIDOcaine viscous HCl 2% oral solution 15 mL (15 mLs Oral Given 6/6/24 0831)   metoprolol succinate (TOPROL-XL) 24 hr split tablet 12.5 mg (12.5 mg Oral Given 6/6/24 1514)   metoprolol succinate (TOPROL-XL) 24 hr split tablet 12.5 mg (12.5 mg Oral Given 6/6/24 1812)     Medical Decision Making  Amount and/or Complexity of Data Reviewed  Labs: ordered.  Radiology: ordered.    Risk  OTC drugs.  Prescription drug management.  Decision regarding hospitalization.               ED Course as of 06/06/24 2226   Thu Jun 06, 2024   0556 Care transferred to Dr. Jasso [KK]      ED Course User Index  [KK] Isabella Sung MD                           Clinical Impression:  Final diagnoses:  [R07.9] Chest pain  [F10.939] Alcohol withdrawal syndrome with complication (Primary)  [R00.0] Tachycardia  [R26.81] Unsteady gait  [R29.6] Repeated falls          ED Disposition Condition    Admit Stable                Isabella Sung MD  06/06/24 0522       Isabella Sung MD  06/06/24 0543       Isabella Sung MD  06/06/24 2226

## 2024-06-06 NOTE — ASSESSMENT & PLAN NOTE
Likely related to alcohol use.  Recent MRI brain without acute stroke   CT head wnl   PT/OT tomorrow   Vitamin b1 recently wnl

## 2024-06-06 NOTE — PLAN OF CARE
Patient newly admitted from the ER. Patient admits to drinking 1.75L of Vodka daily. He says his last drink was Tuesday at 0900 and he intentionally quit drinking at that time. Patient is also trying to quit smoking. Patient alert and oriented in the room. Patient reports falling 7 times in the last 2 wks. Oriented patient to room and instructed patient to call with needs and for assistance out of bed. Patient verbalizes understanding.    Problem: Adult Inpatient Plan of Care  Goal: Plan of Care Review  Outcome: Progressing  Goal: Optimal Comfort and Wellbeing  Outcome: Progressing     Problem: Excessive Substance Use  Goal: Optimized Energy Level (Excessive Substance Use)  Outcome: Progressing  Goal: Enhanced Social, Occupational or Functional Skills (Excessive Substance Use)  Outcome: Progressing     Problem: Fall Injury Risk  Goal: Absence of Fall and Fall-Related Injury  Outcome: Progressing

## 2024-06-06 NOTE — HPI
Patient is a 53 year old male with medical history of alcohol use disorder (1.75 L of alcohol a day), tobacco use disorder, COPD, anxiety and depression who presented to the ED with unsteady gait.  This has be intermittent for some time and had MRI 3/2024.  He usually gets bilateral tunnel vision and then has unsteadiness of his feet.  Additionally he is going through alcohol withdraw since he stopped trying to drink alcohol 2 days ago.    Admitted for alcohol withdrawal.

## 2024-06-06 NOTE — CONSULTS
[facility] - Med Surg (3rd Fl)  Adult Nutrition  Consult Note    SUMMARY     Recommendations  1. Continue regular diet.   2. RD to add Boost Plus once daily and will re-assess need for increased ONS frequency.   3. Honor patient's food preferences to encourage PO intake.   4. RD to follow.    Goals: Patient will meet at least 75% ENN prior to RD follow up.  Nutrition Goal Status: new    Communication of RD Recs: other (comment) (POC)    Assessment and Plan    Nutrition Problem  Increased nutritional needs    Related to (etiology):   Excessive alcohol intake    Signs and Symptoms (as evidenced by):   ETOH withdrawal  Self-reported daily consumption of 1.75 L of vodka   Decreased appetite, unintentional weight loss    Interventions/Recommendations (treatment strategy):  Interventions  1. Commercial beverage  2. Collaboration with other providers    Recommendations  1. Continue regular diet.   2. RD to add Boost Plus once daily and will re-assess need for increased ONS frequency.   3. Honor patient's food preferences to encourage PO intake.   4. RD to follow.    Nutrition Diagnosis Status:   New      Malnutrition Assessment               No NFPE - RD remote                        Reason for Assessment    Reason For Assessment: consult, identified at risk by screening criteria (Unintentional weight loss; MST 3 (14-23# weight loss; decreased appetite))  Diagnosis: other (see comments) (alcohol withdrawal)  Past Medical History:   Diagnosis Date    Addiction to drug     Alcohol abuse     last drink 9/06/22    Anxiety     Bipolar disorder     COPD (chronic obstructive pulmonary disease)     Depression     Disc disorder     Fatigue     Headache     History of psychiatric hospitalization     Hx of psychiatric care     Panic disorder     Psychiatric problem     PTSD (post-traumatic stress disorder)     Sleep difficulties     Suicide attempt     Therapy     Withdrawal symptoms, alcohol      Interdisciplinary Rounds: did not  attend  General Information Comments:   RD providing remote coverage. Consult received for weight loss. Pt is a 54 yo M with ETOH withdrawal due to abrupt ETOH cessation. Pt reports daily consumtpion of 1.75 L of vodka per day. Currently on regular diet -- 75% of breakfast consumed this morning. KIM if patient meeting ENN at this time due to new admit. MST 3 for 14-23 lb weight loss. Chart reivew shows no significant weight loss. Will need NFPE to confirm and timeframe for weight loss; however, patient already with increased protein and energy needs due to alcoholism. LBM 6/6. Will continue to monitor for any nutrition realted changes.    Nutrition Discharge Planning: Pt to dc on general healthful diet    Nutrition Risk Screen    Nutrition Risk Screen: unintentional loss of 10 lbs or more in the past 2 months    Nutrition/Diet History    Food Allergies: NKFA  Factors Affecting Nutritional Intake: excessive alcohol intake, decreased appetite, depression    Anthropometrics    Temp: 98.3 °F (36.8 °C)  Height Method: Stated  Height: 6' (182.9 cm)  Height (inches): 72 in  Weight Method: Standard Scale  Weight: 81.6 kg (180 lb)  Weight (lb): 180 lb  Ideal Body Weight (IBW), Male: 178 lb  % Ideal Body Weight, Male (lb): 101.12 %  BMI (Calculated): 24.4  BMI Grade: 18.5-24.9 - normal       Lab/Procedures/Meds    Pertinent Labs Reviewed: reviewed  Pertinent Labs Comments: chloride: 93 (L), CO2: 15 (L), glucose: 151 (H), bilirubin: 1.9 (H)  Pertinent Medications Reviewed: reviewed  Scheduled:   ARIPiprazole  5 mg Oral Daily    diazePAM  10 mg Oral Q6H    EScitalopram oxalate  20 mg Oral Daily    [START ON 6/7/2024] fluticasone propionate  2 puff Inhalation BID    nicotine  1 patch Transdermal Daily    rOPINIRole  1 mg Oral QHS    [START ON 6/7/2024] tiotropium-olodateroL  2 puff Inhalation Daily       Current Facility-Administered Medications:     lorazepam, 2 mg, Intravenous, Q6H PRN    melatonin, 6 mg, Oral, Nightly PRN     sodium chloride 0.9%, 10 mL, Intravenous, PRN    Physical Findings/Assessment         Estimated/Assessed Needs    Weight Used For Calorie Calculations: 80.9 kg (178 lb 5.6 oz) (IBW)  Energy Calorie Requirements (kcal): 7751-1317 (30-35 kcal/kg)  Energy Need Method: Kcal/kg  Protein Requirements:  g (1.2-1.5 g/kg)  Weight Used For Protein Calculations: 80.9 kg (178 lb 5.6 oz)  Fluid Requirements (mL): 1 mL/kcal  Estimated Fluid Requirement Method: RDA Method  RDA Method (mL): 2427         Nutrition Prescription Ordered    Current Diet Order: regular    Evaluation of Received Nutrient/Fluid Intake    I/O: +560 mL since admit  Energy Calories Required: other (see comments)  Protein Required: other (see comments)  Fluid Required: other (see comments)  Comments: KIM if patient meeting ENN at this time due to new admit  Tolerance: other (see comments)  % Intake of Estimated Energy Needs: Other: see comments  % Meal Intake: Other: 75%    Nutrition Risk    Level of Risk/Frequency of Follow-up: moderate (1-2 x per week)       Monitor and Evaluation    Food and Nutrient Intake: energy intake, food and beverage intake  Food and Nutrient Adminstration: diet order  Knowledge/Beliefs/Attitudes: food and nutrition knowledge/skill, beliefs and attitudes  Physical Activity and Function: nutrition-related ADLs and IADLs, factors affecting access to physical activity  Anthropometric Measurements: height/length, weight, weight change, body mass index  Biochemical Data, Medical Tests and Procedures: electrolyte and renal panel, glucose/endocrine profile, gastrointestinal profile       Nutrition Follow-Up  Yes            Cece More RDN, LDN

## 2024-06-06 NOTE — PLAN OF CARE
06/06/24 1404   Rounds   Attendance Nurse    Discharge Plan A Home   Why the patient remains in the hospital Requires continued medical care   Transition of Care Barriers None     Care team at bedside, discussed plan of care with patient / family.  Discharge planning initiated. Patient provided with Case Management contact information and encouraged to call with concerns or questions.  Will continue to follow for duration of stay.

## 2024-06-06 NOTE — SUBJECTIVE & OBJECTIVE
Past Medical History:   Diagnosis Date    Addiction to drug     Alcohol abuse     last drink 9/06/22    Anxiety     Bipolar disorder     COPD (chronic obstructive pulmonary disease)     Depression     Disc disorder     Fatigue     Headache     History of psychiatric hospitalization     Hx of psychiatric care     Panic disorder     Psychiatric problem     PTSD (post-traumatic stress disorder)     Sleep difficulties     Suicide attempt     Therapy     Withdrawal symptoms, alcohol        Past Surgical History:   Procedure Laterality Date    APPENDECTOMY      COLONOSCOPY      polyps    HERNIA REPAIR      x 2       Review of patient's allergies indicates:   Allergen Reactions    Diphenoxylate-atropine Shortness Of Breath and Other (See Comments)     Esophagus tightens       No current facility-administered medications on file prior to encounter.     Current Outpatient Medications on File Prior to Encounter   Medication Sig    albuterol (PROVENTIL/VENTOLIN HFA) 90 mcg/actuation inhaler Inhale 2 puffs into the lungs every 4 (four) hours as needed for Wheezing or Shortness of Breath.    ARIPiprazole (ABILIFY) 5 MG Tab Take 1 tablet (5 mg total) by mouth once daily.    EScitalopram oxalate (LEXAPRO) 20 MG tablet Take 1 tablet (20 mg total) by mouth once daily.    fluticasone propionate (FLOVENT HFA) 110 mcg/actuation inhaler Inhale 2 puffs into the lungs 2 (two) times daily. Controller    rOPINIRole (REQUIP) 1 MG tablet Take 1 tablet (1 mg total) by mouth every evening. (Patient taking differently: Take 2 mg by mouth every evening.)    tiotropium-olodateroL (STIOLTO RESPIMAT) 2.5-2.5 mcg/actuation Mist Inhale 2 puffs into the lungs once daily. Controller    cetirizine (ZYRTEC) 10 MG tablet Take 1 tablet (10 mg total) by mouth once daily.    LORazepam (ATIVAN) 0.5 MG tablet Take 1 tablet (0.5 mg total) by mouth 2 (two) times daily for 2 days, THEN 1 tablet (0.5 mg total) once daily for 2 days.    [DISCONTINUED] famotidine  (PEPCID) 20 MG tablet Take 1 tablet (20 mg total) by mouth 2 (two) times daily.    [DISCONTINUED] hydrOXYzine pamoate (VISTARIL) 25 MG Cap Take 1 capsule (25 mg total) by mouth every 6 (six) hours as needed (anxiety).    [DISCONTINUED] meclizine (ANTIVERT) 25 mg tablet Take 1 tablet (25 mg total) by mouth 3 (three) times daily as needed for Dizziness.    [DISCONTINUED] ondansetron (ZOFRAN) 8 MG tablet Take 1 tablet (8 mg total) by mouth every 8 (eight) hours as needed for Nausea.    [DISCONTINUED] thiamine 100 MG tablet Take 1 tablet (100 mg total) by mouth once daily.    [DISCONTINUED] traZODone (DESYREL) 100 MG tablet Take 2 tablets (200 mg total) by mouth every evening. (Patient not taking: Reported on 2/20/2024)     Family History       Problem Relation (Age of Onset)    Alcohol abuse Maternal Grandfather, Paternal Grandfather    Anxiety disorder Mother    Depression Mother    Heart block Father    No Known Problems Sister, Brother          Tobacco Use    Smoking status: Every Day     Current packs/day: 1.00     Average packs/day: 1 pack/day for 38.4 years (38.4 ttl pk-yrs)     Types: Cigarettes     Start date: 1/8/1986    Smokeless tobacco: Never   Substance and Sexual Activity    Alcohol use: Yes     Comment: daily drinker--alcohol abuse    Drug use: Not Currently     Types: Marijuana    Sexual activity: Yes     Birth control/protection: None     Review of Systems   Constitutional:  Positive for fatigue. Negative for chills and fever.   HENT:  Negative for ear discharge and ear pain.    Eyes:  Negative for pain and discharge.   Respiratory:  Negative for cough and shortness of breath.    Cardiovascular:  Negative for chest pain and leg swelling.   Gastrointestinal:  Negative for nausea and vomiting.   Endocrine: Negative for cold intolerance and heat intolerance.   Genitourinary:  Negative for difficulty urinating and dysuria.   Musculoskeletal:  Positive for gait problem. Negative for joint swelling and  "myalgias.   Skin:  Negative for rash and wound.   Neurological:  Positive for tremors. Negative for dizziness and headaches.   Psychiatric/Behavioral:  Negative for agitation and confusion.      Objective:     Vital Signs (Most Recent):  Temp: 98.3 °F (36.8 °C) (06/06/24 1150)  Pulse: (!) 113 (06/06/24 1200)  Resp: 16 (06/06/24 1150)  BP: 137/79 (06/06/24 1150)  SpO2: 95 % (06/06/24 1150) Vital Signs (24h Range):  Temp:  [98.1 °F (36.7 °C)-98.4 °F (36.9 °C)] 98.3 °F (36.8 °C)  Pulse:  [104-122] 113  Resp:  [16-23] 16  SpO2:  [95 %-99 %] 95 %  BP: (129-160)/() 137/79     Weight: 81.6 kg (180 lb)  Body mass index is 24.41 kg/m².     Physical Exam  Constitutional:       General: He is not in acute distress.  HENT:      Head: Normocephalic and atraumatic.   Eyes:      General:         Right eye: No discharge.         Left eye: No discharge.   Cardiovascular:      Rate and Rhythm: Regular rhythm. Tachycardia present.   Pulmonary:      Effort: Pulmonary effort is normal.      Breath sounds: Normal breath sounds.   Abdominal:      General: There is no distension.      Tenderness: There is no abdominal tenderness.   Musculoskeletal:         General: No swelling or tenderness.      Cervical back: Neck supple. No tenderness.   Skin:     General: Skin is warm and dry.   Neurological:      General: No focal deficit present.      Mental Status: He is alert and oriented to person, place, and time.      Comments: + anxious  Bilateral upper extremity tremors    Psychiatric:         Mood and Affect: Mood normal.         Behavior: Behavior normal.                Significant Labs: A1C:   Recent Labs   Lab 03/16/24  0613   HGBA1C 5.2     ABGs: No results for input(s): "PH", "PCO2", "HCO3", "POCSATURATED", "BE", "TOTALHB", "COHB", "METHB", "O2HB", "POCFIO2", "PO2" in the last 48 hours.  Bilirubin:   Recent Labs   Lab 06/06/24  0457   BILITOT 1.9*     Blood Culture: No results for input(s): "LABBLOO" in the last 48 hours.  BMP: " "  Recent Labs   Lab 06/06/24 0457   *      K 4.0   CL 93*   CO2 15*   BUN 17   CREATININE 1.3   CALCIUM 10.1     CBC:   Recent Labs   Lab 06/06/24 0457   WBC 9.48   HGB 15.0   HCT 44.3        CMP:   Recent Labs   Lab 06/06/24 0457      K 4.0   CL 93*   CO2 15*   *   BUN 17   CREATININE 1.3   CALCIUM 10.1   PROT 8.3   ALBUMIN 4.7   BILITOT 1.9*   ALKPHOS 69   AST 35   ALT 30   ANIONGAP 29*     Cardiac Markers:   Recent Labs   Lab 06/06/24 0457   BNP 12     Coagulation: No results for input(s): "PT", "INR", "APTT" in the last 48 hours.  Lactic Acid: No results for input(s): "LACTATE" in the last 48 hours.  Lipase:   Recent Labs   Lab 06/06/24 0457   LIPASE 12     Lipid Panel: No results for input(s): "CHOL", "HDL", "LDLCALC", "TRIG", "CHOLHDL" in the last 48 hours.  Magnesium: No results for input(s): "MG" in the last 48 hours.  POCT Glucose: No results for input(s): "POCTGLUCOSE" in the last 48 hours.  Prealbumin: No results for input(s): "PREALBUMIN" in the last 48 hours.  Respiratory Culture: No results for input(s): "GSRESP", "RESPIRATORYC" in the last 48 hours.  Troponin:   Recent Labs   Lab 06/06/24 0457 06/06/24  0751   TROPONINI <0.006 <0.006     TSH:   Recent Labs   Lab 01/15/24  0906   TSH 0.926     Urine Culture: No results for input(s): "LABURIN" in the last 48 hours.  Urine Studies: No results for input(s): "COLORU", "APPEARANCEUA", "PHUR", "SPECGRAV", "PROTEINUA", "GLUCUA", "KETONESU", "BILIRUBINUA", "OCCULTUA", "NITRITE", "UROBILINOGEN", "LEUKOCYTESUR", "RBCUA", "WBCUA", "BACTERIA", "SQUAMEPITHEL", "HYALINECASTS" in the last 48 hours.    Invalid input(s): "WRIGHTSUR"    Significant Imaging: I have reviewed all pertinent imaging results/findings within the past 24 hours.  "

## 2024-06-06 NOTE — H&P
[facility] ([address] [place] [address] Medicine  History & Physical    Patient Name: Maicol Millan III  MRN: [id number]  Patient Class: IP- Inpatient  Admission Date: 6/6/2024  Attending Physician: Francisco Diaz MD   Primary Care Provider: Manisha Hopkins NP         Patient information was obtained from patient and ER records.     Subjective:     Principal Problem:Alcohol withdrawal    Chief Complaint:   Chief Complaint   Patient presents with    Alcohol Problem     Pt to ED via [facility] for generalized body shaking and N/V after he abruptly stopped drinking ~ 20 hours ago. Patient reports drinks 1.75 L vodka daily.         HPI: Patient is a 53 year old male with medical history of alcohol use disorder (1.75 L of alcohol a day), tobacco use disorder, COPD, anxiety and depression who presented to the ED with unsteady gait.  This has be intermittent for some time and had MRI 3/2024.  He usually gets bilateral tunnel vision and then has unsteadiness of his feet.  Additionally he is going through alcohol withdraw since he stopped trying to drink alcohol 2 days ago.    Admitted for alcohol withdrawal.      Past Medical History:   Diagnosis Date    Addiction to drug     Alcohol abuse     last drink 9/06/22    Anxiety     Bipolar disorder     COPD (chronic obstructive pulmonary disease)     Depression     Disc disorder     Fatigue     Headache     History of psychiatric hospitalization     Hx of psychiatric care     Panic disorder     Psychiatric problem     PTSD (post-traumatic stress disorder)     Sleep difficulties     Suicide attempt     Therapy     Withdrawal symptoms, alcohol        Past Surgical History:   Procedure Laterality Date    APPENDECTOMY      COLONOSCOPY      polyps    HERNIA REPAIR      x 2       Review of patient's allergies indicates:   Allergen Reactions    Diphenoxylate-atropine Shortness Of Breath and Other (See Comments)     Esophagus tightens       No current  facility-administered medications on file prior to encounter.     Current Outpatient Medications on File Prior to Encounter   Medication Sig    albuterol (PROVENTIL/VENTOLIN HFA) 90 mcg/actuation inhaler Inhale 2 puffs into the lungs every 4 (four) hours as needed for Wheezing or Shortness of Breath.    ARIPiprazole (ABILIFY) 5 MG Tab Take 1 tablet (5 mg total) by mouth once daily.    EScitalopram oxalate (LEXAPRO) 20 MG tablet Take 1 tablet (20 mg total) by mouth once daily.    fluticasone propionate (FLOVENT HFA) 110 mcg/actuation inhaler Inhale 2 puffs into the lungs 2 (two) times daily. Controller    rOPINIRole (REQUIP) 1 MG tablet Take 1 tablet (1 mg total) by mouth every evening. (Patient taking differently: Take 2 mg by mouth every evening.)    tiotropium-olodateroL (STIOLTO RESPIMAT) 2.5-2.5 mcg/actuation Mist Inhale 2 puffs into the lungs once daily. Controller    cetirizine (ZYRTEC) 10 MG tablet Take 1 tablet (10 mg total) by mouth once daily.    LORazepam (ATIVAN) 0.5 MG tablet Take 1 tablet (0.5 mg total) by mouth 2 (two) times daily for 2 days, THEN 1 tablet (0.5 mg total) once daily for 2 days.    [DISCONTINUED] famotidine (PEPCID) 20 MG tablet Take 1 tablet (20 mg total) by mouth 2 (two) times daily.    [DISCONTINUED] hydrOXYzine pamoate (VISTARIL) 25 MG Cap Take 1 capsule (25 mg total) by mouth every 6 (six) hours as needed (anxiety).    [DISCONTINUED] meclizine (ANTIVERT) 25 mg tablet Take 1 tablet (25 mg total) by mouth 3 (three) times daily as needed for Dizziness.    [DISCONTINUED] ondansetron (ZOFRAN) 8 MG tablet Take 1 tablet (8 mg total) by mouth every 8 (eight) hours as needed for Nausea.    [DISCONTINUED] thiamine 100 MG tablet Take 1 tablet (100 mg total) by mouth once daily.    [DISCONTINUED] traZODone (DESYREL) 100 MG tablet Take 2 tablets (200 mg total) by mouth every evening. (Patient not taking: Reported on 2/20/2024)     Family History       Problem Relation (Age of Onset)    Alcohol  abuse Maternal Grandfather, Paternal Grandfather    Anxiety disorder Mother    Depression Mother    Heart block Father    No Known Problems Sister, Brother          Tobacco Use    Smoking status: Every Day     Current packs/day: 1.00     Average packs/day: 1 pack/day for 38.4 years (38.4 ttl pk-yrs)     Types: Cigarettes     Start date: 1/8/1986    Smokeless tobacco: Never   Substance and Sexual Activity    Alcohol use: Yes     Comment: daily drinker--alcohol abuse    Drug use: Not Currently     Types: Marijuana    Sexual activity: Yes     Birth control/protection: None     Review of Systems   Constitutional:  Positive for fatigue. Negative for chills and fever.   HENT:  Negative for ear discharge and ear pain.    Eyes:  Negative for pain and discharge.   Respiratory:  Negative for cough and shortness of breath.    Cardiovascular:  Negative for chest pain and leg swelling.   Gastrointestinal:  Negative for nausea and vomiting.   Endocrine: Negative for cold intolerance and heat intolerance.   Genitourinary:  Negative for difficulty urinating and dysuria.   Musculoskeletal:  Positive for gait problem. Negative for joint swelling and myalgias.   Skin:  Negative for rash and wound.   Neurological:  Positive for tremors. Negative for dizziness and headaches.   Psychiatric/Behavioral:  Negative for agitation and confusion.      Objective:     Vital Signs (Most Recent):  Temp: 98.3 °F (36.8 °C) (06/06/24 1150)  Pulse: (!) 113 (06/06/24 1200)  Resp: 16 (06/06/24 1150)  BP: 137/79 (06/06/24 1150)  SpO2: 95 % (06/06/24 1150) Vital Signs (24h Range):  Temp:  [98.1 °F (36.7 °C)-98.4 °F (36.9 °C)] 98.3 °F (36.8 °C)  Pulse:  [104-122] 113  Resp:  [16-23] 16  SpO2:  [95 %-99 %] 95 %  BP: (129-160)/() 137/79     Weight: 81.6 kg (180 lb)  Body mass index is 24.41 kg/m².     Physical Exam  Constitutional:       General: He is not in acute distress.  HENT:      Head: Normocephalic and atraumatic.   Eyes:      General:          "Right eye: No discharge.         Left eye: No discharge.   Cardiovascular:      Rate and Rhythm: Regular rhythm. Tachycardia present.   Pulmonary:      Effort: Pulmonary effort is normal.      Breath sounds: Normal breath sounds.   Abdominal:      General: There is no distension.      Tenderness: There is no abdominal tenderness.   Musculoskeletal:         General: No swelling or tenderness.      Cervical back: Neck supple. No tenderness.   Skin:     General: Skin is warm and dry.   Neurological:      General: No focal deficit present.      Mental Status: He is alert and oriented to person, place, and time.      Comments: + anxious  Bilateral upper extremity tremors    Psychiatric:         Mood and Affect: Mood normal.         Behavior: Behavior normal.                Significant Labs: A1C:   Recent Labs   Lab 03/16/24  0613   HGBA1C 5.2     ABGs: No results for input(s): "PH", "PCO2", "HCO3", "POCSATURATED", "BE", "TOTALHB", "COHB", "METHB", "O2HB", "POCFIO2", "PO2" in the last 48 hours.  Bilirubin:   Recent Labs   Lab 06/06/24 0457   BILITOT 1.9*     Blood Culture: No results for input(s): "LABBLOO" in the last 48 hours.  BMP:   Recent Labs   Lab 06/06/24 0457   *      K 4.0   CL 93*   CO2 15*   BUN 17   CREATININE 1.3   CALCIUM 10.1     CBC:   Recent Labs   Lab 06/06/24 0457   WBC 9.48   HGB 15.0   HCT 44.3        CMP:   Recent Labs   Lab 06/06/24 0457      K 4.0   CL 93*   CO2 15*   *   BUN 17   CREATININE 1.3   CALCIUM 10.1   PROT 8.3   ALBUMIN 4.7   BILITOT 1.9*   ALKPHOS 69   AST 35   ALT 30   ANIONGAP 29*     Cardiac Markers:   Recent Labs   Lab 06/06/24 0457   BNP 12     Coagulation: No results for input(s): "PT", "INR", "APTT" in the last 48 hours.  Lactic Acid: No results for input(s): "LACTATE" in the last 48 hours.  Lipase:   Recent Labs   Lab 06/06/24 0457   LIPASE 12     Lipid Panel: No results for input(s): "CHOL", "HDL", "LDLCALC", "TRIG", "CHOLHDL" in the last " "48 hours.  Magnesium: No results for input(s): "MG" in the last 48 hours.  POCT Glucose: No results for input(s): "POCTGLUCOSE" in the last 48 hours.  Prealbumin: No results for input(s): "PREALBUMIN" in the last 48 hours.  Respiratory Culture: No results for input(s): "GSRESP", "RESPIRATORYC" in the last 48 hours.  Troponin:   Recent Labs   Lab 06/06/24  0457 06/06/24  0751   TROPONINI <0.006 <0.006     TSH:   Recent Labs   Lab 01/15/24  0906   TSH 0.926     Urine Culture: No results for input(s): "LABURIN" in the last 48 hours.  Urine Studies: No results for input(s): "COLORU", "APPEARANCEUA", "PHUR", "SPECGRAV", "PROTEINUA", "GLUCUA", "KETONESU", "BILIRUBINUA", "OCCULTUA", "NITRITE", "UROBILINOGEN", "LEUKOCYTESUR", "RBCUA", "WBCUA", "BACTERIA", "SQUAMEPITHEL", "HYALINECASTS" in the last 48 hours.    Invalid input(s): "WRIGHTSUR"    Significant Imaging: I have reviewed all pertinent imaging results/findings within the past 24 hours.  Assessment/Plan:     * Alcohol withdrawal  Po Valium 10mg q6 with taper  Prn ativan  Received banana bag in the ED  Psych consult       Unsteady gait  Likely related to alcohol use.  Recent MRI brain without acute stroke   CT head in march white matter changes of left cerebrum   PT/OT tomorrow   Vitamin b1 recently wnl         COPD (chronic obstructive pulmonary disease)  Continue home inhalers  Will need to f/u outpatient pulmonology for 1.1cm soft tissue density of the right lung on CTA 3/15     Major depressive disorder  Continue home lexapro and aripiprazole       Tobacco abuse  Nicotine patch   Will discuss smoking cessation         VTE Risk Mitigation (From admission, onward)           Ordered     IP VTE LOW RISK PATIENT  Once         06/06/24 0940     Place sequential compression device  Until discontinued         06/06/24 0940                                    Gloria Anguiano PA-C  Department of Hospital Medicine  [facility] [place] [facility] S[facility] (3rd Fl)          "

## 2024-06-07 PROBLEM — R11.2 NAUSEA AND VOMITING: Status: ACTIVE | Noted: 2024-06-07

## 2024-06-07 LAB
ALBUMIN SERPL BCP-MCNC: 4.1 G/DL (ref 3.5–5.2)
ALP SERPL-CCNC: 68 U/L (ref 55–135)
ALT SERPL W/O P-5'-P-CCNC: 26 U/L (ref 10–44)
ANION GAP SERPL CALC-SCNC: 14 MMOL/L (ref 8–16)
AST SERPL-CCNC: 30 U/L (ref 10–40)
BASOPHILS # BLD AUTO: 0.03 K/UL (ref 0–0.2)
BASOPHILS NFR BLD: 0.5 % (ref 0–1.9)
BILIRUB SERPL-MCNC: 0.9 MG/DL (ref 0.1–1)
BUN SERPL-MCNC: 23 MG/DL (ref 6–20)
CALCIUM SERPL-MCNC: 10.2 MG/DL (ref 8.7–10.5)
CHLORIDE SERPL-SCNC: 96 MMOL/L (ref 95–110)
CO2 SERPL-SCNC: 27 MMOL/L (ref 23–29)
CREAT SERPL-MCNC: 1.2 MG/DL (ref 0.5–1.4)
DIFFERENTIAL METHOD BLD: ABNORMAL
EOSINOPHIL # BLD AUTO: 0.1 K/UL (ref 0–0.5)
EOSINOPHIL NFR BLD: 1.7 % (ref 0–8)
ERYTHROCYTE [DISTWIDTH] IN BLOOD BY AUTOMATED COUNT: 15.5 % (ref 11.5–14.5)
EST. GFR  (NO RACE VARIABLE): >60 ML/MIN/1.73 M^2
GLUCOSE SERPL-MCNC: 131 MG/DL (ref 70–110)
HCT VFR BLD AUTO: 42.7 % (ref 40–54)
HGB BLD-MCNC: 14.5 G/DL (ref 14–18)
IMM GRANULOCYTES # BLD AUTO: 0.01 K/UL (ref 0–0.04)
IMM GRANULOCYTES NFR BLD AUTO: 0.2 % (ref 0–0.5)
LYMPHOCYTES # BLD AUTO: 1.1 K/UL (ref 1–4.8)
LYMPHOCYTES NFR BLD: 18.2 % (ref 18–48)
MAGNESIUM SERPL-MCNC: 2.1 MG/DL (ref 1.6–2.6)
MCH RBC QN AUTO: 31.7 PG (ref 27–31)
MCHC RBC AUTO-ENTMCNC: 34 G/DL (ref 32–36)
MCV RBC AUTO: 93 FL (ref 82–98)
MONOCYTES # BLD AUTO: 0.4 K/UL (ref 0.3–1)
MONOCYTES NFR BLD: 7.4 % (ref 4–15)
NEUTROPHILS # BLD AUTO: 4.3 K/UL (ref 1.8–7.7)
NEUTROPHILS NFR BLD: 72 % (ref 38–73)
NRBC BLD-RTO: 0 /100 WBC
PLATELET # BLD AUTO: 166 K/UL (ref 150–450)
PMV BLD AUTO: 10.1 FL (ref 9.2–12.9)
POTASSIUM SERPL-SCNC: 3.9 MMOL/L (ref 3.5–5.1)
PROT SERPL-MCNC: 7.2 G/DL (ref 6–8.4)
RBC # BLD AUTO: 4.58 M/UL (ref 4.6–6.2)
SODIUM SERPL-SCNC: 137 MMOL/L (ref 136–145)
WBC # BLD AUTO: 5.94 K/UL (ref 3.9–12.7)

## 2024-06-07 PROCEDURE — 94640 AIRWAY INHALATION TREATMENT: CPT

## 2024-06-07 PROCEDURE — 97161 PT EVAL LOW COMPLEX 20 MIN: CPT

## 2024-06-07 PROCEDURE — 99900031 HC PATIENT EDUCATION (STAT)

## 2024-06-07 PROCEDURE — S4991 NICOTINE PATCH NONLEGEND: HCPCS | Performed by: PHYSICIAN ASSISTANT

## 2024-06-07 PROCEDURE — 11000001 HC ACUTE MED/SURG PRIVATE ROOM

## 2024-06-07 PROCEDURE — 99232 SBSQ HOSP IP/OBS MODERATE 35: CPT | Mod: 95,,, | Performed by: PHYSICIAN ASSISTANT

## 2024-06-07 PROCEDURE — 25000242 PHARM REV CODE 250 ALT 637 W/ HCPCS: Performed by: PHYSICIAN ASSISTANT

## 2024-06-07 PROCEDURE — 36415 COLL VENOUS BLD VENIPUNCTURE: CPT | Performed by: PHYSICIAN ASSISTANT

## 2024-06-07 PROCEDURE — 80053 COMPREHEN METABOLIC PANEL: CPT | Performed by: PHYSICIAN ASSISTANT

## 2024-06-07 PROCEDURE — 63600175 PHARM REV CODE 636 W HCPCS: Performed by: PHYSICIAN ASSISTANT

## 2024-06-07 PROCEDURE — 25000003 PHARM REV CODE 250: Performed by: PHYSICIAN ASSISTANT

## 2024-06-07 PROCEDURE — 63600175 PHARM REV CODE 636 W HCPCS: Performed by: STUDENT IN AN ORGANIZED HEALTH CARE EDUCATION/TRAINING PROGRAM

## 2024-06-07 PROCEDURE — 97165 OT EVAL LOW COMPLEX 30 MIN: CPT

## 2024-06-07 PROCEDURE — 83735 ASSAY OF MAGNESIUM: CPT | Performed by: PHYSICIAN ASSISTANT

## 2024-06-07 PROCEDURE — 99223 1ST HOSP IP/OBS HIGH 75: CPT | Mod: AF,HB,, | Performed by: STUDENT IN AN ORGANIZED HEALTH CARE EDUCATION/TRAINING PROGRAM

## 2024-06-07 PROCEDURE — 90833 PSYTX W PT W E/M 30 MIN: CPT | Mod: AF,HB,, | Performed by: STUDENT IN AN ORGANIZED HEALTH CARE EDUCATION/TRAINING PROGRAM

## 2024-06-07 PROCEDURE — 85025 COMPLETE CBC W/AUTO DIFF WBC: CPT | Performed by: PHYSICIAN ASSISTANT

## 2024-06-07 PROCEDURE — 94761 N-INVAS EAR/PLS OXIMETRY MLT: CPT

## 2024-06-07 PROCEDURE — 25000003 PHARM REV CODE 250: Performed by: STUDENT IN AN ORGANIZED HEALTH CARE EDUCATION/TRAINING PROGRAM

## 2024-06-07 RX ORDER — PANTOPRAZOLE SODIUM 40 MG/1
40 TABLET, DELAYED RELEASE ORAL DAILY
Status: DISCONTINUED | OUTPATIENT
Start: 2024-06-07 | End: 2024-06-11 | Stop reason: HOSPADM

## 2024-06-07 RX ORDER — METOPROLOL TARTRATE 25 MG/1
25 TABLET, FILM COATED ORAL 2 TIMES DAILY
Status: DISCONTINUED | OUTPATIENT
Start: 2024-06-07 | End: 2024-06-07

## 2024-06-07 RX ORDER — ARFORMOTEROL TARTRATE 15 UG/2ML
15 SOLUTION RESPIRATORY (INHALATION) 2 TIMES DAILY
Status: DISCONTINUED | OUTPATIENT
Start: 2024-06-07 | End: 2024-06-07

## 2024-06-07 RX ORDER — BUDESONIDE 0.5 MG/2ML
0.5 INHALANT ORAL EVERY 12 HOURS
Status: DISCONTINUED | OUTPATIENT
Start: 2024-06-07 | End: 2024-06-07

## 2024-06-07 RX ORDER — IPRATROPIUM BROMIDE 0.5 MG/2.5ML
0.5 SOLUTION RESPIRATORY (INHALATION) EVERY 6 HOURS
Status: DISCONTINUED | OUTPATIENT
Start: 2024-06-07 | End: 2024-06-07

## 2024-06-07 RX ADMIN — DIAZEPAM 10 MG: 10 TABLET ORAL at 11:06

## 2024-06-07 RX ADMIN — DIAZEPAM 10 MG: 10 TABLET ORAL at 06:06

## 2024-06-07 RX ADMIN — PROCHLORPERAZINE EDISYLATE 2.5 MG: 5 INJECTION INTRAMUSCULAR; INTRAVENOUS at 11:06

## 2024-06-07 RX ADMIN — PROCHLORPERAZINE EDISYLATE 2.5 MG: 5 INJECTION INTRAMUSCULAR; INTRAVENOUS at 01:06

## 2024-06-07 RX ADMIN — DIAZEPAM 10 MG: 10 TABLET ORAL at 05:06

## 2024-06-07 RX ADMIN — IPRATROPIUM BROMIDE 0.5 MG: 0.5 SOLUTION RESPIRATORY (INHALATION) at 12:06

## 2024-06-07 RX ADMIN — ARFORMOTEROL TARTRATE 15 MCG: 15 SOLUTION RESPIRATORY (INHALATION) at 09:06

## 2024-06-07 RX ADMIN — ARIPIPRAZOLE 5 MG: 5 TABLET ORAL at 09:06

## 2024-06-07 RX ADMIN — NICOTINE 1 PATCH: 14 PATCH, EXTENDED RELEASE TRANSDERMAL at 09:06

## 2024-06-07 RX ADMIN — METOPROLOL TARTRATE 25 MG: 25 TABLET, FILM COATED ORAL at 02:06

## 2024-06-07 RX ADMIN — ALUMINUM HYDROXIDE, MAGNESIUM HYDROXIDE, AND DIMETHICONE 30 ML: 400; 400; 40 SUSPENSION ORAL at 11:06

## 2024-06-07 RX ADMIN — LORAZEPAM 2 MG: 2 INJECTION INTRAMUSCULAR; INTRAVENOUS at 12:06

## 2024-06-07 RX ADMIN — ROPINIROLE HYDROCHLORIDE 1 MG: 1 TABLET, FILM COATED ORAL at 08:06

## 2024-06-07 RX ADMIN — PANTOPRAZOLE SODIUM 40 MG: 40 TABLET, DELAYED RELEASE ORAL at 09:06

## 2024-06-07 RX ADMIN — ESCITALOPRAM 20 MG: 20 TABLET, FILM COATED ORAL at 09:06

## 2024-06-07 NOTE — PT/OT/SLP PROGRESS
Occupational Therapy      Patient Name:  Maicol Millan III   MRN:  [id number]    Patient not seen today secondary to Pt taking shower. Will follow-up later today.    6/7/2024

## 2024-06-07 NOTE — PLAN OF CARE
[facility] - Med Surg (3rd Fl)  Initial Discharge Assessment       Primary Care Provider: Manisha Hopkins NP    Admission Diagnosis: Tachycardia [R00.0]  Unsteady gait [R26.81]  Repeated falls [R29.6]  Chest pain [R07.9]  Alcohol withdrawal syndrome with complication [F10.939]    Admission Date: 6/6/2024  Expected Discharge Date:     Transition of Care Barriers: (P) None    Payor: MEDICAID / Plan: [facility] / Product Type: Managed Medicaid /     Extended Emergency Contact Information  Primary Emergency Contact: Preethi Jimenez  Mobile Phone: [phone]  Relation: Significant other  Preferred language: English   needed? No    Discharge Plan A: (P) Rehab  Discharge Plan B: (P) Rehab      [facility] - [place], [place] - [address]  [address]  [place] [address]  Phone: [phone] Fax: [phone]    [id number] - [place] - [address] AT [facility] [address]  [address] [place] [address]  Phone: [phone] Fax: [phone]      Initial Assessment (most recent)       Adult Discharge Assessment - 06/07/24 1248          Discharge Assessment    Confirmed/corrected address, phone number and insurance Yes (P)      Confirmed Demographics Correct on Facesheet (P)      Source of Information patient (P)      Communicated JORDAN with patient/caregiver Yes (P)      Reason For Admission Alcohol Withdrawal (P)      People in Home significant other (P)      Facility Arrived From: Home (P)      Do you expect to return to your current living situation? Yes (P)      Do you have help at home or someone to help you manage your care at home? Yes (P)      Who are your caregiver(s) and their phone number(s)? Preethi Dwyer, [phone] (P)      Prior to hospitilization cognitive status: Alert/Oriented (P)      Current cognitive status: Alert/Oriented (P)      Walking or Climbing Stairs Difficulty no (P)      Dressing/Bathing  Difficulty no (P)      Home Layout Able to live on 1st floor (P)      Equipment Currently Used at Home none (P)      Readmission within 30 days? No (P)      Patient currently being followed by outpatient case management? No (P)      Do you currently have service(s) that help you manage your care at home? No (P)      Do you take prescription medications? Yes (P)      Do you have prescription coverage? Yes (P)      Coverage Medicaid- [employer] (P)      Do you have any problems affording any of your prescribed medications? No (P)      Is the patient taking medications as prescribed? yes (P)      Who is going to help you get home at discharge? Spouse, Preethi (P)      How do you get to doctors appointments? car, drives self;family or friend will provide (P)      Are you on dialysis? No (P)      Discharge Plan A Rehab (P)      Discharge Plan B Rehab (P)      DME Needed Upon Discharge  none (P)      Discharge Plan discussed with: Patient (P)      Transition of Care Barriers None (P)                      Discharge assessment completed at bedside. Patient is requesting to go to [facility] once medically ready for discharge. SW will remain available and assist with placement.
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
## 2024-06-07 NOTE — HOSPITAL COURSE
"PT HD stable on room air.  Still tachycardia but appears more relaxed today. Nausea and vomiting improved with compazine. Repeat labs pending.  T bili elevated yesterday.  Will give one dose of ativan now and continue valium 10mg q6 for another 24 hours. He will likely need a slower taper due to the amount of his alcohol use.      6/8/24  Pt with concerning sx's of ETOH withdrawal last night. CIWA score of 13. Prn ativan was not working. He was given an extra 4mg of ativan IV, Valium increased to q4 scheduled, Ativan increased to 2mg q 2, and transferred to the ICU to start precedex drip overnight\. He did not tolerate the precedex---hypotension. Required rapid response and IV fluid bolus. Precedex has since been held. This am he is in restraints but relatively calm and cooperative since getting his scheduled po valium about an hour ago. Vital signs are normal, stable.      6/9/24  Doing much better this am. Slept well overnight   His CIWA score is 6 this morning   Dr. Interiano has seen patient and made medication recommendations with parameters for prn ativan (which he has not had to use since yesterday) and she changed his abilify to Risperdal until delerium under better control.   Will downgrade patient to floor this morning      6/10/23  He is getting valium 10 mg po tid , haldol prn .  BP is stable.  " I am not feeling well"     6/11 PT HD stable on room air.  Calm today and ready to go to [facility].      "

## 2024-06-07 NOTE — PLAN OF CARE
Problem: Adult Inpatient Plan of Care  Goal: Plan of Care Review  Outcome: Progressing  Goal: Patient-Specific Goal (Individualized)  Outcome: Progressing  Goal: Absence of Hospital-Acquired Illness or Injury  Outcome: Progressing  Goal: Optimal Comfort and Wellbeing  Outcome: Progressing  Goal: Readiness for Transition of Care  Outcome: Progressing     Problem: Excessive Substance Use  Goal: Optimized Energy Level (Excessive Substance Use)  Outcome: Progressing  Goal: Improved Behavioral Control (Excessive Substance Use)  Outcome: Progressing  Goal: Increased Participation and Engagement (Excessive Substance Use)  Outcome: Progressing  Goal: Improved Physiologic Symptoms (Excessive Substance Use)  Outcome: Progressing  Goal: Enhanced Social, Occupational or Functional Skills (Excessive Substance Use)  Outcome: Progressing     Problem: Fall Injury Risk  Goal: Absence of Fall and Fall-Related Injury  Outcome: Progressing

## 2024-06-07 NOTE — ASSESSMENT & PLAN NOTE
Po Valium 10mg q6 with taper to q 8 tomorrow  Prn ativan  Received banana bag in the ED  Psych consult

## 2024-06-07 NOTE — ASSESSMENT & PLAN NOTE
Likely related to alcohol use.  Recent MRI brain without acute stroke   CT head wnl   PT/OT today  Vitamin b1 recently wnl

## 2024-06-07 NOTE — PROGRESS NOTES
[facility] ([facility])  [facility] Medicine  Progress Note    Patient Name: Maicol Millan III  MRN: [id number]  Patient Class: IP- Inpatient   Admission Date: 6/6/2024  Length of Stay: 1 days  Attending Physician: Francisco Diaz MD  Primary Care Provider: Manisha Hopkins NP        Subjective:     Principal Problem:Alcohol withdrawal        HPI:  Patient is a 53 year old male with medical history of alcohol use disorder (1.75 L of alcohol a day), tobacco use disorder, COPD, anxiety and depression who presented to the ED with unsteady gait.  This has be intermittent for some time and had MRI 3/2024.  He usually gets bilateral tunnel vision and then has unsteadiness of his feet.  Additionally he is going through alcohol withdraw since he stopped trying to drink alcohol 2 days ago.    Admitted for alcohol withdrawal.      Overview/Hospital Course:  PT HD stable on room air.  Still tachycardia but appears more relaxed today. Nausea and vomiting improved with compazine. Repeat labs pending.  T bili elevated yesterday.  Will give one dose of ativan now and continue valium 10mg q6 for another 24 hours. He will likely need a slower taper due to the amount of his alcohol use.      Past Medical History:   Diagnosis Date    Addiction to drug     Alcohol abuse     last drink 9/06/22    Anxiety     Bipolar disorder     COPD (chronic obstructive pulmonary disease)     Depression     Disc disorder     Fatigue     Headache     History of psychiatric hospitalization     Hx of psychiatric care     Panic disorder     Psychiatric problem     PTSD (post-traumatic stress disorder)     Sleep difficulties     Suicide attempt     Therapy     Withdrawal symptoms, alcohol        Past Surgical History:   Procedure Laterality Date    APPENDECTOMY      COLONOSCOPY      polyps    HERNIA REPAIR      x 2       Review of patient's allergies indicates:   Allergen Reactions    Diphenoxylate-atropine Shortness Of Breath and Other  (See Comments)     Esophagus tightens       No current facility-administered medications on file prior to encounter.     Current Outpatient Medications on File Prior to Encounter   Medication Sig    albuterol (PROVENTIL/VENTOLIN HFA) 90 mcg/actuation inhaler Inhale 2 puffs into the lungs every 4 (four) hours as needed for Wheezing or Shortness of Breath.    ARIPiprazole (ABILIFY) 5 MG Tab Take 1 tablet (5 mg total) by mouth once daily.    EScitalopram oxalate (LEXAPRO) 20 MG tablet Take 1 tablet (20 mg total) by mouth once daily.    fluticasone propionate (FLOVENT HFA) 110 mcg/actuation inhaler Inhale 2 puffs into the lungs 2 (two) times daily. Controller    rOPINIRole (REQUIP) 1 MG tablet Take 1 tablet (1 mg total) by mouth every evening. (Patient taking differently: Take 2 mg by mouth every evening.)    tiotropium-olodateroL (STIOLTO RESPIMAT) 2.5-2.5 mcg/actuation Mist Inhale 2 puffs into the lungs once daily. Controller    cetirizine (ZYRTEC) 10 MG tablet Take 1 tablet (10 mg total) by mouth once daily.    LORazepam (ATIVAN) 0.5 MG tablet Take 1 tablet (0.5 mg total) by mouth 2 (two) times daily for 2 days, THEN 1 tablet (0.5 mg total) once daily for 2 days.     Family History       Problem Relation (Age of Onset)    Alcohol abuse Maternal Grandfather, Paternal Grandfather    Anxiety disorder Mother    Depression Mother    Heart block Father    No Known Problems Sister, Brother          Tobacco Use    Smoking status: Every Day     Current packs/day: 1.00     Average packs/day: 1 pack/day for 38.4 years (38.4 ttl pk-yrs)     Types: Cigarettes     Start date: 1/8/1986    Smokeless tobacco: Never   Substance and Sexual Activity    Alcohol use: Yes     Comment: daily drinker--alcohol abuse    Drug use: Not Currently     Types: Marijuana    Sexual activity: Yes     Birth control/protection: None     Review of Systems   Constitutional:  Negative for chills, fatigue and fever.   HENT:  Negative for ear discharge and  ear pain.    Eyes:  Negative for pain and discharge.   Respiratory:  Negative for cough and shortness of breath.    Cardiovascular:  Negative for chest pain and leg swelling.   Gastrointestinal:  Positive for abdominal pain, nausea and vomiting.   Endocrine: Negative for cold intolerance and heat intolerance.   Genitourinary:  Negative for difficulty urinating and dysuria.   Musculoskeletal:  Positive for gait problem. Negative for joint swelling and myalgias.   Skin:  Negative for rash and wound.   Neurological:  Positive for tremors. Negative for dizziness and headaches.   Psychiatric/Behavioral:  Negative for agitation and confusion.      Objective:     Vital Signs (Most Recent):  Temp: 98.1 °F (36.7 °C) (06/07/24 0743)  Pulse: 103 (06/07/24 0743)  Resp: 20 (06/07/24 0743)  BP: 136/89 (06/07/24 0743)  SpO2: 95 % (06/07/24 0743) Vital Signs (24h Range):  Temp:  [97.6 °F (36.4 °C)-98.5 °F (36.9 °C)] 98.1 °F (36.7 °C)  Pulse:  [] 103  Resp:  [16-22] 20  SpO2:  [94 %-98 %] 95 %  BP: (136-167)/(75-91) 136/89     Weight: 81.6 kg (180 lb)  Body mass index is 24.41 kg/m².     Physical Exam  Constitutional:       General: He is not in acute distress.  HENT:      Head: Normocephalic and atraumatic.   Eyes:      General:         Right eye: No discharge.         Left eye: No discharge.   Cardiovascular:      Rate and Rhythm: Regular rhythm. Tachycardia present.   Pulmonary:      Effort: Pulmonary effort is normal.      Breath sounds: Normal breath sounds.   Abdominal:      General: Bowel sounds are normal. There is no distension.      Tenderness: There is no abdominal tenderness.   Musculoskeletal:         General: No swelling or tenderness.      Cervical back: Neck supple. No tenderness.   Skin:     General: Skin is warm and dry.   Neurological:      General: No focal deficit present.      Mental Status: He is alert and oriented to person, place, and time.      Comments: + tremors    Psychiatric:         Mood and  "Affect: Mood normal.         Behavior: Behavior normal.                Significant Labs: A1C:   Recent Labs   Lab 03/16/24  0613 06/06/24  1253   HGBA1C 5.2 5.2     ABGs: No results for input(s): "PH", "PCO2", "HCO3", "POCSATURATED", "BE", "TOTALHB", "COHB", "METHB", "O2HB", "POCFIO2", "PO2" in the last 48 hours.  Bilirubin:   Recent Labs   Lab 06/06/24  0457   BILITOT 1.9*     Blood Culture: No results for input(s): "LABBLOO" in the last 48 hours.  BMP:   Recent Labs   Lab 06/06/24  0457   *      K 4.0   CL 93*   CO2 15*   BUN 17   CREATININE 1.3   CALCIUM 10.1     CBC:   Recent Labs   Lab 06/06/24  0457 06/07/24  0725   WBC 9.48 5.94   HGB 15.0 14.5   HCT 44.3 42.7    166     CMP:   Recent Labs   Lab 06/06/24  0457      K 4.0   CL 93*   CO2 15*   *   BUN 17   CREATININE 1.3   CALCIUM 10.1   PROT 8.3   ALBUMIN 4.7   BILITOT 1.9*   ALKPHOS 69   AST 35   ALT 30   ANIONGAP 29*     Cardiac Markers:   Recent Labs   Lab 06/06/24  0457   BNP 12     Coagulation: No results for input(s): "PT", "INR", "APTT" in the last 48 hours.  Lactic Acid: No results for input(s): "LACTATE" in the last 48 hours.  Lipase:   Recent Labs   Lab 06/06/24  0457   LIPASE 12     Lipid Panel: No results for input(s): "CHOL", "HDL", "LDLCALC", "TRIG", "CHOLHDL" in the last 48 hours.  Magnesium: No results for input(s): "MG" in the last 48 hours.  POCT Glucose: No results for input(s): "POCTGLUCOSE" in the last 48 hours.  Prealbumin: No results for input(s): "PREALBUMIN" in the last 48 hours.  Respiratory Culture: No results for input(s): "GSRESP", "RESPIRATORYC" in the last 48 hours.  Troponin:   Recent Labs   Lab 06/06/24  0457 06/06/24  0751   TROPONINI <0.006 <0.006     TSH:   Recent Labs   Lab 01/15/24  0906   TSH 0.926     Urine Culture: No results for input(s): "LABURIN" in the last 48 hours.  Urine Studies: No results for input(s): "COLORU", "APPEARANCEUA", "PHUR", "SPECGRAV", "PROTEINUA", "GLUCUA", " ""KETONESU", "BILIRUBINUA", "OCCULTUA", "NITRITE", "UROBILINOGEN", "LEUKOCYTESUR", "RBCUA", "WBCUA", "BACTERIA", "SQUAMEPITHEL", "HYALINECASTS" in the last 48 hours.    Invalid input(s): "WRIGHTSUR"    Significant Imaging: I have reviewed all pertinent imaging results/findings within the past 24 hours.    Assessment/Plan:      * Alcohol withdrawal  Po Valium 10mg q6 with taper to q 8 tomorrow  Prn ativan  Received banana bag in the ED  Psych consult      Nausea and vomiting  Likely due to alcohol withdrawal  Will get repeat labs and monitor t bili  If continues will consider further imaging       Unsteady gait  Likely related to alcohol use.  Recent MRI brain without acute stroke   CT head wnl   PT/OT today  Vitamin b1 recently wnl         COPD (chronic obstructive pulmonary disease)  Patient unable to get home inhalers and will discuss with pharmacy alternatives     Major depressive disorder  Continue home lexapro and aripiprazole       Tobacco abuse  Nicotine patch   Will discuss smoking cessation         VTE Risk Mitigation (From admission, onward)           Ordered     IP VTE LOW RISK PATIENT  Once         06/06/24 0940     Place sequential compression device  Until discontinued         06/06/24 0940                    Discharge Planning   JORDAN:      Code Status: Full Code   Is the patient medically ready for discharge?:     Reason for patient still in hospital (select all that apply): Patient trending condition  Discharge Plan A: Home                  Gloria Anguiano PA-C  Department of Hospital Medicine   [place] [facility] Surg (3rd Fl)    "

## 2024-06-07 NOTE — ASSESSMENT & PLAN NOTE
Likely due to alcohol withdrawal  Will get repeat labs and monitor t bili  If continues will consider further imaging

## 2024-06-07 NOTE — PT/OT/SLP EVAL
Physical Therapy Evaluation and Discharge Note    Patient Name:  Maicol Millan III   MRN:  [id number]    Recommendations:     Discharge Recommendations: No Therapy Indicated (Home)  Discharge Equipment Recommendations: none   Barriers to discharge: None    Assessment:     Maicol Millan III is a 53 y.o. male admitted with a medical diagnosis of Alcohol withdrawal. .  At this time, patient is functioning at their prior level of function and does not require further acute PT services.     Recent Surgery: * No surgery found *      Plan:     During this hospitalization, patient does not require further acute PT services.  Please re-consult if situation changes.      Subjective     Chief Complaint: none at this time  Patient/Family Comments/goals: none stated  Pain/Comfort:  Pain Rating 1: 0/10    Patients cultural, spiritual, Gnosticism conflicts given the current situation: no    Living Environment:  Pt lives with wife in a 2 BR apartment with threshold to enter.  Prior to admission, patients level of function was Independent.  Equipment used at home: none.  DME owned (not currently used): none.  Upon discharge, patient will have assistance from wife and friends.    Objective:     Communicated with pt prior to session.  Patient found HOB elevated with bed alarm, peripheral IV, telemetry upon PT entry to room.    General Precautions: Standard, fall    Orthopedic Precautions:N/A   Braces: N/A  Respiratory Status: Room air    Exams:  Cognitive Exam:  Patient is oriented to Person, Place, Time, and Situation  Gross Motor Coordination:  WFL  Postural Exam:  Patient presented with the following abnormalities:    -       Kyphosis  Sensation:    -       Intact  Skin Integrity/Edema:      -       Skin integrity: Visible skin intact  RLE ROM: WNL  RLE Strength: WFL  LLE ROM: WNL  LLE Strength: WFL    Functional Mobility:  Bed Mobility:     Rolling Left:  independence  Rolling Right: independence  Scooting:  independence  Bridging: independence  Supine to Sit: independence  Sit to Supine: independence  Transfers:     Sit to Stand:  independence with no AD  Bed to Chair: independence with  no AD  using  Step Transfer  Gait: Independent x ~500 feet with no AD. No gait deviations. Cervical Kyphotic posture.  Balance: Sitting and Standing: Independent  Stairs:  Pt ascended/descended 8 steps stair(s) with No Assistive Device with no handrails with Independent.     AM-PAC 6 CLICK MOBILITY  Total Score:24       Treatment and Education:  Completed PT eval and further PT tx is not recommended. Educated pt with safety measures to do when gets episode of general body shakes and  postural ex on the bed with good understanding and good carry over.    AM-PAC 6 CLICK MOBILITY  Total Score:24     Patient left sitting edge of bed with all lines intact, call button in reach, and nurse and medical team notified.    GOALS:   Multidisciplinary Problems       Physical Therapy Goals       Not on file                    History:     Past Medical History:   Diagnosis Date    Addiction to drug     Alcohol abuse     last drink 9/06/22    Anxiety     Bipolar disorder     COPD (chronic obstructive pulmonary disease)     Depression     Disc disorder     Fatigue     Headache     History of psychiatric hospitalization     Hx of psychiatric care     Panic disorder     Psychiatric problem     PTSD (post-traumatic stress disorder)     Sleep difficulties     Suicide attempt     Therapy     Withdrawal symptoms, alcohol        Past Surgical History:   Procedure Laterality Date    APPENDECTOMY      COLONOSCOPY      polyps    HERNIA REPAIR      x 2       Time Tracking:     PT Received On: 06/07/24  PT Start Time: 0947     PT Stop Time: 1002  PT Total Time (min): 15 min     Billable Minutes: Evaluation 15      06/07/2024

## 2024-06-07 NOTE — PLAN OF CARE
Problem: Adult Inpatient Plan of Care  Goal: Plan of Care Review  Outcome: Progressing  Flowsheets (Taken 6/7/2024 0483)  Plan of Care Reviewed With: patient  Goal: Patient-Specific Goal (Individualized)  Outcome: Progressing  Goal: Absence of Hospital-Acquired Illness or Injury  Outcome: Progressing  Goal: Optimal Comfort and Wellbeing  Outcome: Progressing  Goal: Readiness for Transition of Care  Outcome: Progressing     Problem: Excessive Substance Use  Goal: Optimized Energy Level (Excessive Substance Use)  Outcome: Progressing  Goal: Improved Behavioral Control (Excessive Substance Use)  Outcome: Progressing  Goal: Increased Participation and Engagement (Excessive Substance Use)  Outcome: Progressing  Goal: Improved Physiologic Symptoms (Excessive Substance Use)  Outcome: Progressing  Goal: Enhanced Social, Occupational or Functional Skills (Excessive Substance Use)  Outcome: Progressing     Problem: Fall Injury Risk  Goal: Absence of Fall and Fall-Related Injury  Outcome: Progressing

## 2024-06-07 NOTE — PLAN OF CARE
06/07/24 1517   Post-Acute Status   Post-Acute Authorization Placement   Coverage Medicaid- [employer]   Discharge Delays None known at this time   Discharge Plan   Discharge Plan A Rehab   Discharge Plan B Rehab     Patient orginally wanted to attend rehab at [facility], but patient now is interested in staying at [facility]. SW messaged provider about acceptance onto [facility]. Awaiting response.

## 2024-06-07 NOTE — SUBJECTIVE & OBJECTIVE
Past Medical History:   Diagnosis Date    Addiction to drug     Alcohol abuse     last drink 9/06/22    Anxiety     Bipolar disorder     COPD (chronic obstructive pulmonary disease)     Depression     Disc disorder     Fatigue     Headache     History of psychiatric hospitalization     Hx of psychiatric care     Panic disorder     Psychiatric problem     PTSD (post-traumatic stress disorder)     Sleep difficulties     Suicide attempt     Therapy     Withdrawal symptoms, alcohol        Past Surgical History:   Procedure Laterality Date    APPENDECTOMY      COLONOSCOPY      polyps    HERNIA REPAIR      x 2       Review of patient's allergies indicates:   Allergen Reactions    Diphenoxylate-atropine Shortness Of Breath and Other (See Comments)     Esophagus tightens       No current facility-administered medications on file prior to encounter.     Current Outpatient Medications on File Prior to Encounter   Medication Sig    albuterol (PROVENTIL/VENTOLIN HFA) 90 mcg/actuation inhaler Inhale 2 puffs into the lungs every 4 (four) hours as needed for Wheezing or Shortness of Breath.    ARIPiprazole (ABILIFY) 5 MG Tab Take 1 tablet (5 mg total) by mouth once daily.    EScitalopram oxalate (LEXAPRO) 20 MG tablet Take 1 tablet (20 mg total) by mouth once daily.    fluticasone propionate (FLOVENT HFA) 110 mcg/actuation inhaler Inhale 2 puffs into the lungs 2 (two) times daily. Controller    rOPINIRole (REQUIP) 1 MG tablet Take 1 tablet (1 mg total) by mouth every evening. (Patient taking differently: Take 2 mg by mouth every evening.)    tiotropium-olodateroL (STIOLTO RESPIMAT) 2.5-2.5 mcg/actuation Mist Inhale 2 puffs into the lungs once daily. Controller    cetirizine (ZYRTEC) 10 MG tablet Take 1 tablet (10 mg total) by mouth once daily.    LORazepam (ATIVAN) 0.5 MG tablet Take 1 tablet (0.5 mg total) by mouth 2 (two) times daily for 2 days, THEN 1 tablet (0.5 mg total) once daily for 2 days.     Family History       Problem  Relation (Age of Onset)    Alcohol abuse Maternal Grandfather, Paternal Grandfather    Anxiety disorder Mother    Depression Mother    Heart block Father    No Known Problems Sister, Brother          Tobacco Use    Smoking status: Every Day     Current packs/day: 1.00     Average packs/day: 1 pack/day for 38.4 years (38.4 ttl pk-yrs)     Types: Cigarettes     Start date: 1/8/1986    Smokeless tobacco: Never   Substance and Sexual Activity    Alcohol use: Yes     Comment: daily drinker--alcohol abuse    Drug use: Not Currently     Types: Marijuana    Sexual activity: Yes     Birth control/protection: None     Review of Systems   Constitutional:  Negative for chills, fatigue and fever.   HENT:  Negative for ear discharge and ear pain.    Eyes:  Negative for pain and discharge.   Respiratory:  Negative for cough and shortness of breath.    Cardiovascular:  Negative for chest pain and leg swelling.   Gastrointestinal:  Positive for abdominal pain, nausea and vomiting.   Endocrine: Negative for cold intolerance and heat intolerance.   Genitourinary:  Negative for difficulty urinating and dysuria.   Musculoskeletal:  Positive for gait problem. Negative for joint swelling and myalgias.   Skin:  Negative for rash and wound.   Neurological:  Positive for tremors. Negative for dizziness and headaches.   Psychiatric/Behavioral:  Negative for agitation and confusion.      Objective:     Vital Signs (Most Recent):  Temp: 98.1 °F (36.7 °C) (06/07/24 0743)  Pulse: 103 (06/07/24 0743)  Resp: 20 (06/07/24 0743)  BP: 136/89 (06/07/24 0743)  SpO2: 95 % (06/07/24 0743) Vital Signs (24h Range):  Temp:  [97.6 °F (36.4 °C)-98.5 °F (36.9 °C)] 98.1 °F (36.7 °C)  Pulse:  [] 103  Resp:  [16-22] 20  SpO2:  [94 %-98 %] 95 %  BP: (136-167)/(75-91) 136/89     Weight: 81.6 kg (180 lb)  Body mass index is 24.41 kg/m².     Physical Exam  Constitutional:       General: He is not in acute distress.  HENT:      Head: Normocephalic and atraumatic.  "  Eyes:      General:         Right eye: No discharge.         Left eye: No discharge.   Cardiovascular:      Rate and Rhythm: Regular rhythm. Tachycardia present.   Pulmonary:      Effort: Pulmonary effort is normal.      Breath sounds: Normal breath sounds.   Abdominal:      General: Bowel sounds are normal. There is no distension.      Tenderness: There is no abdominal tenderness.   Musculoskeletal:         General: No swelling or tenderness.      Cervical back: Neck supple. No tenderness.   Skin:     General: Skin is warm and dry.   Neurological:      General: No focal deficit present.      Mental Status: He is alert and oriented to person, place, and time.      Comments: + tremors    Psychiatric:         Mood and Affect: Mood normal.         Behavior: Behavior normal.                Significant Labs: A1C:   Recent Labs   Lab 03/16/24  0613 06/06/24  1253   HGBA1C 5.2 5.2     ABGs: No results for input(s): "PH", "PCO2", "HCO3", "POCSATURATED", "BE", "TOTALHB", "COHB", "METHB", "O2HB", "POCFIO2", "PO2" in the last 48 hours.  Bilirubin:   Recent Labs   Lab 06/06/24 0457   BILITOT 1.9*     Blood Culture: No results for input(s): "LABBLOO" in the last 48 hours.  BMP:   Recent Labs   Lab 06/06/24 0457   *      K 4.0   CL 93*   CO2 15*   BUN 17   CREATININE 1.3   CALCIUM 10.1     CBC:   Recent Labs   Lab 06/06/24 0457 06/07/24  0725   WBC 9.48 5.94   HGB 15.0 14.5   HCT 44.3 42.7    166     CMP:   Recent Labs   Lab 06/06/24 0457      K 4.0   CL 93*   CO2 15*   *   BUN 17   CREATININE 1.3   CALCIUM 10.1   PROT 8.3   ALBUMIN 4.7   BILITOT 1.9*   ALKPHOS 69   AST 35   ALT 30   ANIONGAP 29*     Cardiac Markers:   Recent Labs   Lab 06/06/24 0457   BNP 12     Coagulation: No results for input(s): "PT", "INR", "APTT" in the last 48 hours.  Lactic Acid: No results for input(s): "LACTATE" in the last 48 hours.  Lipase:   Recent Labs   Lab 06/06/24 0457   LIPASE 12     Lipid Panel: No " "results for input(s): "CHOL", "HDL", "LDLCALC", "TRIG", "CHOLHDL" in the last 48 hours.  Magnesium: No results for input(s): "MG" in the last 48 hours.  POCT Glucose: No results for input(s): "POCTGLUCOSE" in the last 48 hours.  Prealbumin: No results for input(s): "PREALBUMIN" in the last 48 hours.  Respiratory Culture: No results for input(s): "GSRESP", "RESPIRATORYC" in the last 48 hours.  Troponin:   Recent Labs   Lab 06/06/24  0457 06/06/24  0751   TROPONINI <0.006 <0.006     TSH:   Recent Labs   Lab 01/15/24  0906   TSH 0.926     Urine Culture: No results for input(s): "LABURIN" in the last 48 hours.  Urine Studies: No results for input(s): "COLORU", "APPEARANCEUA", "PHUR", "SPECGRAV", "PROTEINUA", "GLUCUA", "KETONESU", "BILIRUBINUA", "OCCULTUA", "NITRITE", "UROBILINOGEN", "LEUKOCYTESUR", "RBCUA", "WBCUA", "BACTERIA", "SQUAMEPITHEL", "HYALINECASTS" in the last 48 hours.    Invalid input(s): "WRIGHTSUR"    Significant Imaging: I have reviewed all pertinent imaging results/findings within the past 24 hours.  "

## 2024-06-07 NOTE — PT/OT/SLP EVAL
"Occupational Therapy   Evaluation and Discharge Note    Name: Maicol Millan III  MRN: [id number]  Admitting Diagnosis: Alcohol withdrawal  Recent Surgery: * No surgery found *      Recommendations:     Discharge Recommendations: No Therapy Indicated  Discharge Equipment Recommendations: none  Barriers to discharge:  None    Assessment:     Maicol Millan III is a 53 y.o. male with a medical diagnosis of Alcohol withdrawal. At this time, patient is functioning at their prior level of function and does not require further acute OT services.     Plan:     During this hospitalization, patient does not require further acute OT services.  Please re-consult if situation changes.    Plan of Care Reviewed with: patient    Subjective     Chief Complaint: None  Patient/Family Comments/goals: "From here I am going to a behavioral health center for my depression."    Occupational Profile:  Living Environment: Pt lives in [facility] with wife with 1 YARA.  Previous level of function: Ind  Equipment Used at home: none  Assistance upon Discharge: Wife    Pain/Comfort:  Pain Rating 1: 0/10  Pain Rating Post-Intervention 1: 0/10    Patients cultural, spiritual, Worship conflicts given the current situation: no    Objective:     Communicated with: Nursing prior to session.  Patient found ambulatory in room/arevalo with   upon OT entry to room.    General Precautions: Standard, fall  Orthopedic Precautions: N/A  Braces: N/A  Respiratory Status: Room air     Occupational Performance:    Bed Mobility:    Patient completed Rolling/Turning to Right with independence  Patient completed Scooting/Bridging with independence  Patient completed Supine to Sit with independence  Patient completed Sit to Supine with independence    Functional Mobility/Transfers:  Patient completed Sit <> Stand Transfer with independence  with  no assistive device   Patient completed Toilet Transfer Step Transfer technique with independence with  no AD  Functional " Mobility: Ind to ambualted ~10 feet to and from bathroom with no AD.    Activities of Daily Living:  Grooming: independence to wash face and brush teeth at sink level.  Bathing: independence for self shower upon initial entry to room.  Upper Body Dressing: independence to don shirt at EOB.  Lower Body Dressing: independence To don pants and socks seated EOB.    Cognitive/Visual Perceptual:  Cognitive/Psychosocial Skills:     -       Oriented to: Person, Place, Time, and Situation   -       Follows Commands/attention:Follows multistep  commands  -       Communication: Clear/fluent    Physical Exam:  Balance:    -       Good static/dynamic balance throughout.  Postural examination/scapula alignment:    -       Rounded shoulders  -       Forward head  -       Scoliosis  Upper Extremity Range of Motion:     -       Right Upper Extremity: WFL  -       Left Upper Extremity: WFL  Upper Extremity Strength:    -       Right Upper Extremity: WFL  -       Left Upper Extremity: WFL   Strength:    -       Right Upper Extremity: WFL  -       Left Upper Extremity: WFL  Fine Motor Coordination:    -       Intact    AMPAC 6 Click ADL:  AMPAC Total Score: 24    Treatment & Education:  OT evaluation completed with Pt. Due to current level of function no further acute OT services recommended at this time.     Patient left sitting edge of bed with all lines intact and call button in reach    GOALS:   Multidisciplinary Problems       Occupational Therapy Goals       Not on file                    History:     Past Medical History:   Diagnosis Date    Addiction to drug     Alcohol abuse     last drink 9/06/22    Anxiety     Bipolar disorder     COPD (chronic obstructive pulmonary disease)     Depression     Disc disorder     Fatigue     Headache     History of psychiatric hospitalization     Hx of psychiatric care     Panic disorder     Psychiatric problem     PTSD (post-traumatic stress disorder)     Sleep difficulties     Suicide  attempt     Therapy     Withdrawal symptoms, alcohol          Past Surgical History:   Procedure Laterality Date    APPENDECTOMY      COLONOSCOPY      polyps    HERNIA REPAIR      x 2       Time Tracking:     OT Date of Treatment:    OT Start Time: 1052  OT Stop Time: 1106  OT Total Time (min): 14 min    Billable Minutes:Evaluation 14    6/7/2024

## 2024-06-08 PROCEDURE — 25000003 PHARM REV CODE 250: Performed by: STUDENT IN AN ORGANIZED HEALTH CARE EDUCATION/TRAINING PROGRAM

## 2024-06-08 PROCEDURE — 25000003 PHARM REV CODE 250: Performed by: FAMILY MEDICINE

## 2024-06-08 PROCEDURE — 99900031 HC PATIENT EDUCATION (STAT)

## 2024-06-08 PROCEDURE — S4991 NICOTINE PATCH NONLEGEND: HCPCS | Performed by: FAMILY MEDICINE

## 2024-06-08 PROCEDURE — 99900035 HC TECH TIME PER 15 MIN (STAT)

## 2024-06-08 PROCEDURE — C9399 UNCLASSIFIED DRUGS OR BIOLOG: HCPCS

## 2024-06-08 PROCEDURE — 25000003 PHARM REV CODE 250: Performed by: PSYCHIATRY & NEUROLOGY

## 2024-06-08 PROCEDURE — 20000000 HC ICU ROOM

## 2024-06-08 PROCEDURE — 63600175 PHARM REV CODE 636 W HCPCS: Performed by: PHYSICIAN ASSISTANT

## 2024-06-08 PROCEDURE — 25000003 PHARM REV CODE 250: Performed by: SURGERY

## 2024-06-08 PROCEDURE — 99233 SBSQ HOSP IP/OBS HIGH 50: CPT | Mod: AF,HB,, | Performed by: PSYCHIATRY & NEUROLOGY

## 2024-06-08 PROCEDURE — 25000003 PHARM REV CODE 250

## 2024-06-08 PROCEDURE — 99233 SBSQ HOSP IP/OBS HIGH 50: CPT | Mod: ,,, | Performed by: FAMILY MEDICINE

## 2024-06-08 PROCEDURE — 63600175 PHARM REV CODE 636 W HCPCS: Performed by: FAMILY MEDICINE

## 2024-06-08 PROCEDURE — 63600175 PHARM REV CODE 636 W HCPCS: Performed by: SURGERY

## 2024-06-08 PROCEDURE — 94761 N-INVAS EAR/PLS OXIMETRY MLT: CPT

## 2024-06-08 PROCEDURE — 25000003 PHARM REV CODE 250: Performed by: PHYSICIAN ASSISTANT

## 2024-06-08 PROCEDURE — S4991 NICOTINE PATCH NONLEGEND: HCPCS | Performed by: PHYSICIAN ASSISTANT

## 2024-06-08 PROCEDURE — 63600175 PHARM REV CODE 636 W HCPCS: Performed by: PSYCHIATRY & NEUROLOGY

## 2024-06-08 RX ORDER — SODIUM CHLORIDE 9 MG/ML
1000 INJECTION, SOLUTION INTRAVENOUS ONCE
Status: DISCONTINUED | OUTPATIENT
Start: 2024-06-08 | End: 2024-06-08 | Stop reason: CLARIF

## 2024-06-08 RX ORDER — DEXMEDETOMIDINE HYDROCHLORIDE 4 UG/ML
INJECTION INTRAVENOUS
Status: COMPLETED
Start: 2024-06-08 | End: 2024-06-08

## 2024-06-08 RX ORDER — HALOPERIDOL 5 MG/ML
10 INJECTION INTRAMUSCULAR EVERY 4 HOURS PRN
Status: DISCONTINUED | OUTPATIENT
Start: 2024-06-08 | End: 2024-06-08

## 2024-06-08 RX ORDER — DEXMEDETOMIDINE HYDROCHLORIDE 4 UG/ML
0-1.4 INJECTION, SOLUTION INTRAVENOUS CONTINUOUS
Status: DISCONTINUED | OUTPATIENT
Start: 2024-06-08 | End: 2024-06-09

## 2024-06-08 RX ORDER — LORAZEPAM 2 MG/ML
2 INJECTION INTRAMUSCULAR
Status: DISCONTINUED | OUTPATIENT
Start: 2024-06-08 | End: 2024-06-08

## 2024-06-08 RX ORDER — NOREPINEPHRINE BITARTRATE/D5W 4MG/250ML
PLASTIC BAG, INJECTION (ML) INTRAVENOUS
Status: DISPENSED
Start: 2024-06-08 | End: 2024-06-08

## 2024-06-08 RX ORDER — HALOPERIDOL 5 MG/1
10 TABLET ORAL EVERY 6 HOURS PRN
Status: DISCONTINUED | OUTPATIENT
Start: 2024-06-08 | End: 2024-06-11 | Stop reason: HOSPADM

## 2024-06-08 RX ORDER — SODIUM CHLORIDE 9 MG/ML
1000 INJECTION, SOLUTION INTRAVENOUS CONTINUOUS
Status: DISCONTINUED | OUTPATIENT
Start: 2024-06-08 | End: 2024-06-09

## 2024-06-08 RX ORDER — HALOPERIDOL 5 MG/ML
5 INJECTION INTRAMUSCULAR ONCE
Status: COMPLETED | OUTPATIENT
Start: 2024-06-08 | End: 2024-06-08

## 2024-06-08 RX ORDER — LORAZEPAM 2 MG/ML
2 INJECTION INTRAMUSCULAR
Status: DISCONTINUED | OUTPATIENT
Start: 2024-06-08 | End: 2024-06-11 | Stop reason: HOSPADM

## 2024-06-08 RX ORDER — MIDAZOLAM HYDROCHLORIDE 5 MG/ML
2 INJECTION INTRAMUSCULAR; INTRAVENOUS ONCE
Status: DISCONTINUED | OUTPATIENT
Start: 2024-06-08 | End: 2024-06-08 | Stop reason: ALTCHOICE

## 2024-06-08 RX ORDER — SODIUM CHLORIDE 9 MG/ML
1000 INJECTION, SOLUTION INTRAVENOUS CONTINUOUS
Status: ACTIVE | OUTPATIENT
Start: 2024-06-08 | End: 2024-06-08

## 2024-06-08 RX ORDER — FOLIC ACID 1 MG/1
1 TABLET ORAL DAILY
Status: DISCONTINUED | OUTPATIENT
Start: 2024-06-08 | End: 2024-06-11 | Stop reason: HOSPADM

## 2024-06-08 RX ORDER — LORAZEPAM 2 MG/ML
4 INJECTION INTRAMUSCULAR ONCE
Status: COMPLETED | OUTPATIENT
Start: 2024-06-08 | End: 2024-06-08

## 2024-06-08 RX ORDER — SODIUM CHLORIDE 9 MG/ML
1000 INJECTION, SOLUTION INTRAVENOUS ONCE
Status: COMPLETED | OUTPATIENT
Start: 2024-06-08 | End: 2024-06-08

## 2024-06-08 RX ORDER — DIAZEPAM 10 MG/1
10 TABLET ORAL EVERY 4 HOURS
Status: DISCONTINUED | OUTPATIENT
Start: 2024-06-08 | End: 2024-06-11 | Stop reason: HOSPADM

## 2024-06-08 RX ORDER — RISPERIDONE 0.5 MG/1
0.5 TABLET ORAL 2 TIMES DAILY
Status: DISCONTINUED | OUTPATIENT
Start: 2024-06-08 | End: 2024-06-08

## 2024-06-08 RX ORDER — NOREPINEPHRINE BITARTRATE/D5W 4MG/250ML
0-3 PLASTIC BAG, INJECTION (ML) INTRAVENOUS CONTINUOUS
Status: DISCONTINUED | OUTPATIENT
Start: 2024-06-08 | End: 2024-06-09

## 2024-06-08 RX ORDER — MUPIROCIN 20 MG/G
OINTMENT TOPICAL 2 TIMES DAILY
Status: DISCONTINUED | OUTPATIENT
Start: 2024-06-08 | End: 2024-06-11 | Stop reason: HOSPADM

## 2024-06-08 RX ORDER — THIAMINE HCL 100 MG
100 TABLET ORAL DAILY
Status: DISCONTINUED | OUTPATIENT
Start: 2024-06-08 | End: 2024-06-11 | Stop reason: HOSPADM

## 2024-06-08 RX ORDER — IBUPROFEN 200 MG
1 TABLET ORAL DAILY
Status: DISCONTINUED | OUTPATIENT
Start: 2024-06-08 | End: 2024-06-11 | Stop reason: HOSPADM

## 2024-06-08 RX ORDER — HALOPERIDOL 5 MG/ML
10 INJECTION INTRAMUSCULAR EVERY 8 HOURS PRN
Status: DISCONTINUED | OUTPATIENT
Start: 2024-06-08 | End: 2024-06-11 | Stop reason: HOSPADM

## 2024-06-08 RX ORDER — RISPERIDONE 0.5 MG/1
0.5 TABLET ORAL 2 TIMES DAILY
Status: DISCONTINUED | OUTPATIENT
Start: 2024-06-08 | End: 2024-06-11 | Stop reason: HOSPADM

## 2024-06-08 RX ADMIN — NICOTINE 1 PATCH: 21 PATCH, EXTENDED RELEASE TRANSDERMAL at 09:06

## 2024-06-08 RX ADMIN — FOLIC ACID 1 MG: 1 TABLET ORAL at 11:06

## 2024-06-08 RX ADMIN — DIAZEPAM 10 MG: 10 TABLET ORAL at 08:06

## 2024-06-08 RX ADMIN — RISPERIDONE 0.5 MG: 0.5 TABLET, FILM COATED ORAL at 08:06

## 2024-06-08 RX ADMIN — PANTOPRAZOLE SODIUM 40 MG: 40 TABLET, DELAYED RELEASE ORAL at 08:06

## 2024-06-08 RX ADMIN — SODIUM CHLORIDE 1000 ML: 9 INJECTION, SOLUTION INTRAVENOUS at 04:06

## 2024-06-08 RX ADMIN — DEXMEDETOMIDINE HYDROCHLORIDE 0.2 MCG/KG/HR: 400 INJECTION INTRAVENOUS at 03:06

## 2024-06-08 RX ADMIN — MUPIROCIN: 20 OINTMENT TOPICAL at 08:06

## 2024-06-08 RX ADMIN — SODIUM CHLORIDE 1000 ML: 9 INJECTION, SOLUTION INTRAVENOUS at 03:06

## 2024-06-08 RX ADMIN — DIAZEPAM 10 MG: 10 TABLET ORAL at 03:06

## 2024-06-08 RX ADMIN — Medication 6 MG: at 11:06

## 2024-06-08 RX ADMIN — THERA TABS 1 TABLET: TAB at 11:06

## 2024-06-08 RX ADMIN — SODIUM CHLORIDE 1000 ML: 9 INJECTION, SOLUTION INTRAVENOUS at 07:06

## 2024-06-08 RX ADMIN — ROPINIROLE HYDROCHLORIDE 1 MG: 1 TABLET, FILM COATED ORAL at 08:06

## 2024-06-08 RX ADMIN — ARIPIPRAZOLE 5 MG: 5 TABLET ORAL at 08:06

## 2024-06-08 RX ADMIN — DIAZEPAM 10 MG: 10 TABLET ORAL at 11:06

## 2024-06-08 RX ADMIN — NICOTINE 1 PATCH: 14 PATCH, EXTENDED RELEASE TRANSDERMAL at 08:06

## 2024-06-08 RX ADMIN — DIAZEPAM 10 MG: 10 TABLET ORAL at 02:06

## 2024-06-08 RX ADMIN — DEXMEDETOMIDINE HYDROCHLORIDE 0.5 MCG/KG/HR: 4 INJECTION INTRAVENOUS at 03:06

## 2024-06-08 RX ADMIN — LORAZEPAM 2 MG: 2 INJECTION INTRAMUSCULAR; INTRAVENOUS at 03:06

## 2024-06-08 RX ADMIN — LORAZEPAM 2 MG: 2 INJECTION INTRAMUSCULAR; INTRAVENOUS at 01:06

## 2024-06-08 RX ADMIN — SODIUM CHLORIDE 1000 ML: 0.9 INJECTION, SOLUTION INTRAVENOUS at 07:06

## 2024-06-08 RX ADMIN — HALOPERIDOL LACTATE 5 MG: 5 INJECTION, SOLUTION INTRAMUSCULAR at 12:06

## 2024-06-08 RX ADMIN — RISPERIDONE 0.5 MG: 0.5 TABLET, FILM COATED ORAL at 11:06

## 2024-06-08 RX ADMIN — Medication 100 MG: at 11:06

## 2024-06-08 RX ADMIN — LORAZEPAM 4 MG: 2 INJECTION INTRAMUSCULAR; INTRAVENOUS at 02:06

## 2024-06-08 RX ADMIN — ESCITALOPRAM 20 MG: 20 TABLET, FILM COATED ORAL at 08:06

## 2024-06-08 NOTE — NURSING
Called patient's significant other at patient request. Patient spoke to her on the handheld phone without incident.

## 2024-06-08 NOTE — PROGRESS NOTES
PSYCHIATRY CONSULT PROGRESS NOTE  SUBSEQUENT HOSPITAL VISIT    DATE OF ADMISSION: 6/6/2024  4:36 AM  LENGTH OF STAY: 2 days    HISTORY  Maicol Millan III is a 53 y.o. male, currently in ICU for Alcohol and now in DTs    Interim Events  The patient was seen and examined. The chart was reviewed. Staff gave report. Patient had acute non-redirectable agiation last night and required percedex drip that was not tolerated due to hypotension.     Remains delirious-on exam he was not in confusional phase but earlier this morning and shortly after interview he was confused about situation, belief that family member were in the room, agitated biting at restraints, and requesting to call police so that he can leave.     During interview he states he is in the hospital for alcohol withdrawal. He has somewhat disorganized tangent about for the past 6 month feeling that he has been hit in the head with a bat and when it happens he feels that he may faints and that he is off balance. Associated with frontal headache. It last for 3 minutes and it causes him great anxiety because he worries that if it happens while driving he will crash and hurt himself or others. He has seen his PCP and has neurology appointment in Nov. 20024. Denies experiencing this symptoms since admit. Though he is overall confused.     He reports GI distress and is confused that he has diarrhea this morning but has not. He denies current diaphoresis. Mild-moderate bilateral hand tremor. No AVH on exam-looks around for his wife but assumes she must have gone to the bathroom-confused in the moment but not having hallucinations. Dull headache.    ROS  General ROS: negative  Ophthalmic ROS: negative  ENT ROS: negative  Respiratory ROS: no cough, shortness of breath, or wheezing  Cardiovascular ROS: no chest pain or dyspnea on exertion  Gastrointestinal ROS: GI distress   Genito-Urinary ROS: no dysuria, trouble voiding, or hematuria  Musculoskeletal ROS:  negative  Neurological ROS: dull headache     DIAGNOSTIC TESTING   Laboratory Results  No results found for this or any previous visit (from the past 24 hour(s)).    VITALS   Vitals:    06/08/24 1000   BP: 123/65   Pulse: 79   Resp: (!) 23   Temp:         Gait and Station: in bed  Involuntary Movements: tremor  Muscle Tone: no gross abnml    MSE:   Cognition: Alert and oriented to person, Luckey May  7, 2024  Memory: impaired   Attention: impaired   Appearance: stated age, hospital gown, well groomed  Attitude: calm cooperative  Behavior  psychomotor agitation/tremor of bilateral hands good eye contact  Speech normal rate, tone, volume, conversational.    Language fluent English.   Affect euthymic, blunted   Thought process: linear and logical--waxing/waning   Thought content: +Confusion if family is in the room  denies suicidal or homicidal ideation.  Denies auditory hallucinations, paranoid ideation, ideas of reference.    Insight fair to reason for admit-with waxing and waning of this insight due to delirium   Judgment poor    Assessment and Plan:    Alcohol Withdrawal Delirium-severe (Delirium Tremens)   -Vitamin supplements  -CIWA and q2 vitals   -Seizure precautions  -Valium 10mg q4  -Ativan 2mg IV prn SBP>160, DBP>100, HR>100, CIWA>8     -Risperdal 0.5mg PO BID first dose now --  -Haldol 5mg IV x 1 dose for agitation/hallucinations was partial effective   -Haldol 10mg PO q6 prn agitation or Haldol 10mg IV q8 prn agitation   -Alert provider when Haldol is given so that treatment progress is being closely monitored    MDD  - continue lexapro 10 mg pO qd  - pt counseled  - follow up with outpatient mental health providers after discharge for medication management and psychotherapy     Psychosis  - r/o SIPD  - Switch to Risperdal-long term goal will be switch back to Abilify when acute delirium resolves     SI  - recommend PEC and admit to U once medically stable  - provide psychotherapeutic interventions  and medication management     Anxiety  - lexapro as above     PTSD   - lexapro as above    Chart and Labs reviewed. Discussed progress and ongoing symptoms with clinical team.    STAFF:   Zohreh Interiano MD  Psychiatry

## 2024-06-08 NOTE — NURSING
Rapid response called at 615 am secondary to pt. Being unresponsive and hypotensive.  Precedex stopped immediately upon assessment. Dr. Levin and Dr. Jasso arrived with new orders given.

## 2024-06-08 NOTE — PROGRESS NOTES
[facility] Medicine  Progress Note    Patient Name: Maicol Millan III  MRN: [id number]  Patient Class: IP- Inpatient   Admission Date: 6/6/2024  Length of Stay: 2 days  Attending Physician: Francisco Diaz MD  Primary Care Provider: Manisha Hopkins NP        Subjective:     Principal Problem:Alcohol withdrawal        HPI:  Patient is a 53 year old male with medical history of alcohol use disorder (1.75 L of alcohol a day), tobacco use disorder, COPD, anxiety and depression who presented to the ED with unsteady gait.  This has be intermittent for some time and had MRI 3/2024.  He usually gets bilateral tunnel vision and then has unsteadiness of his feet.  Additionally he is going through alcohol withdraw since he stopped trying to drink alcohol 2 days ago.    Admitted for alcohol withdrawal.      Overview/Hospital Course:  PT HD stable on room air.  Still tachycardia but appears more relaxed today. Nausea and vomiting improved with compazine. Repeat labs pending.  T bili elevated yesterday.  Will give one dose of ativan now and continue valium 10mg q6 for another 24 hours. He will likely need a slower taper due to the amount of his alcohol use.      6/8/24  Pt with concerning sx's of ETOH withdrawal last night. CIWA score of 13. Prn ativan was not working. He was given an extra 4mg of ativan IV, Valium increased to q4 scheduled, Ativan increased to 2mg q 2, and transferred to the ICU to start precedex drip overnight\. He did not tolerate the precedex---hypotension. Required rapid response and IV fluid bolus. Precedex has since been held. This am he is in restraints but relatively calm and cooperative since getting his scheduled po valium about an hour ago. Vital signs are normal, stable.        Past Medical History:   Diagnosis Date    Addiction to drug     Alcohol abuse     last drink 9/06/22    Anxiety     Bipolar disorder     COPD (chronic obstructive pulmonary disease)      Depression     Disc disorder     Fatigue     Headache     History of psychiatric hospitalization     Hx of psychiatric care     Panic disorder     Psychiatric problem     PTSD (post-traumatic stress disorder)     Sleep difficulties     Suicide attempt     Therapy     Withdrawal symptoms, alcohol        Past Surgical History:   Procedure Laterality Date    APPENDECTOMY      COLONOSCOPY      polyps    HERNIA REPAIR      x 2       Review of patient's allergies indicates:   Allergen Reactions    Diphenoxylate-atropine Shortness Of Breath and Other (See Comments)     Esophagus tightens       No current facility-administered medications on file prior to encounter.     Current Outpatient Medications on File Prior to Encounter   Medication Sig    albuterol (PROVENTIL/VENTOLIN HFA) 90 mcg/actuation inhaler Inhale 2 puffs into the lungs every 4 (four) hours as needed for Wheezing or Shortness of Breath.    ARIPiprazole (ABILIFY) 5 MG Tab Take 1 tablet (5 mg total) by mouth once daily.    EScitalopram oxalate (LEXAPRO) 20 MG tablet Take 1 tablet (20 mg total) by mouth once daily.    fluticasone propionate (FLOVENT HFA) 110 mcg/actuation inhaler Inhale 2 puffs into the lungs 2 (two) times daily. Controller    rOPINIRole (REQUIP) 1 MG tablet Take 1 tablet (1 mg total) by mouth every evening. (Patient taking differently: Take 2 mg by mouth every evening.)    tiotropium-olodateroL (STIOLTO RESPIMAT) 2.5-2.5 mcg/actuation Mist Inhale 2 puffs into the lungs once daily. Controller    cetirizine (ZYRTEC) 10 MG tablet Take 1 tablet (10 mg total) by mouth once daily.    LORazepam (ATIVAN) 0.5 MG tablet Take 1 tablet (0.5 mg total) by mouth 2 (two) times daily for 2 days, THEN 1 tablet (0.5 mg total) once daily for 2 days.     Family History       Problem Relation (Age of Onset)    Alcohol abuse Maternal Grandfather, Paternal Grandfather    Anxiety disorder Mother    Depression Mother    Heart block Father    No Known Problems Sister,  Brother          Tobacco Use    Smoking status: Every Day     Current packs/day: 1.00     Average packs/day: 1 pack/day for 38.4 years (38.4 ttl pk-yrs)     Types: Cigarettes     Start date: 1/8/1986    Smokeless tobacco: Never   Substance and Sexual Activity    Alcohol use: Yes     Comment: daily drinker--alcohol abuse    Drug use: Not Currently     Types: Marijuana    Sexual activity: Yes     Birth control/protection: None     Review of Systems   Constitutional:  Negative for chills, fatigue and fever.   HENT:  Negative for ear discharge and ear pain.    Eyes:  Negative for pain and discharge.   Respiratory:  Negative for cough and shortness of breath.    Cardiovascular:  Negative for chest pain and leg swelling.   Gastrointestinal:  Negative for abdominal pain, nausea and vomiting.   Endocrine: Negative for cold intolerance and heat intolerance.   Genitourinary:  Negative for difficulty urinating and dysuria.   Musculoskeletal:  Positive for gait problem. Negative for joint swelling and myalgias.   Skin:  Negative for rash and wound.   Neurological:  Positive for tremors. Negative for dizziness and headaches.   Psychiatric/Behavioral:  Positive for agitation. Negative for confusion.      Objective:     Vital Signs (Most Recent):  Temp: 98.1 °F (36.7 °C) (06/08/24 0732)  Pulse: 71 (06/08/24 0830)  Resp: (!) 25 (06/08/24 0830)  BP: 105/66 (06/08/24 0830)  SpO2: (!) 91 % (06/08/24 0830) Vital Signs (24h Range):  Temp:  [97.1 °F (36.2 °C)-98.2 °F (36.8 °C)] 98.1 °F (36.7 °C)  Pulse:  [] 71  Resp:  [15-43] 25  SpO2:  [91 %-100 %] 91 %  BP: ()/(51-92) 105/66     Weight: 81.6 kg (180 lb)  Body mass index is 24.41 kg/m².     Physical Exam  Constitutional:       General: He is not in acute distress.     Comments: 4 point restraints. Appears anxious. Slight tremor    HENT:      Head: Normocephalic and atraumatic.   Eyes:      General:         Right eye: No discharge.         Left eye: No discharge.  "  Cardiovascular:      Rate and Rhythm: Normal rate and regular rhythm.   Pulmonary:      Effort: Pulmonary effort is normal.      Breath sounds: Normal breath sounds.   Abdominal:      General: Bowel sounds are normal. There is no distension.      Tenderness: There is no abdominal tenderness.   Musculoskeletal:         General: No swelling or tenderness.      Cervical back: Neck supple. No tenderness.   Skin:     General: Skin is warm and dry.   Neurological:      General: No focal deficit present.      Mental Status: He is alert.      Comments: He is oriented to person only.  + tremors    Psychiatric:         Mood and Affect: Mood normal.         Behavior: Behavior normal.                Significant Labs: A1C:   Recent Labs   Lab 03/16/24  0613 06/06/24  1253   HGBA1C 5.2 5.2     ABGs: No results for input(s): "PH", "PCO2", "HCO3", "POCSATURATED", "BE", "TOTALHB", "COHB", "METHB", "O2HB", "POCFIO2", "PO2" in the last 48 hours.  Bilirubin:   Recent Labs   Lab 06/06/24  0457 06/07/24  0725   BILITOT 1.9* 0.9     Blood Culture: No results for input(s): "LABBLOO" in the last 48 hours.  BMP:   Recent Labs   Lab 06/07/24  0725   *      K 3.9   CL 96   CO2 27   BUN 23*   CREATININE 1.2   CALCIUM 10.2   MG 2.1     CBC:   Recent Labs   Lab 06/07/24  0725   WBC 5.94   HGB 14.5   HCT 42.7        CMP:   Recent Labs   Lab 06/07/24  0725      K 3.9   CL 96   CO2 27   *   BUN 23*   CREATININE 1.2   CALCIUM 10.2   PROT 7.2   ALBUMIN 4.1   BILITOT 0.9   ALKPHOS 68   AST 30   ALT 26   ANIONGAP 14     Cardiac Markers:   No results for input(s): "CKMB", "MYOGLOBIN", "BNP", "TROPISTAT" in the last 48 hours.    Coagulation: No results for input(s): "PT", "INR", "APTT" in the last 48 hours.  Lactic Acid: No results for input(s): "LACTATE" in the last 48 hours.  Lipase:   No results for input(s): "LIPASE" in the last 48 hours.    Lipid Panel: No results for input(s): "CHOL", "HDL", "LDLCALC", "TRIG", " ""CHOLHDL" in the last 48 hours.  Magnesium:   Recent Labs   Lab 06/07/24  0725   MG 2.1     POCT Glucose: No results for input(s): "POCTGLUCOSE" in the last 48 hours.  Prealbumin: No results for input(s): "PREALBUMIN" in the last 48 hours.  Respiratory Culture: No results for input(s): "GSRESP", "RESPIRATORYC" in the last 48 hours.  Troponin:   No results for input(s): "TROPONINI", "TROPONINIHS" in the last 48 hours.    TSH:   Recent Labs   Lab 01/15/24  0906   TSH 0.926     Urine Culture: No results for input(s): "LABURIN" in the last 48 hours.  Urine Studies: No results for input(s): "COLORU", "APPEARANCEUA", "PHUR", "SPECGRAV", "PROTEINUA", "GLUCUA", "KETONESU", "BILIRUBINUA", "OCCULTUA", "NITRITE", "UROBILINOGEN", "LEUKOCYTESUR", "RBCUA", "WBCUA", "BACTERIA", "SQUAMEPITHEL", "HYALINECASTS" in the last 48 hours.    Invalid input(s): "WRIGHTSUR"    Significant Imaging: I have reviewed all pertinent imaging results/findings within the past 24 hours.    Assessment/Plan:      * Alcohol withdrawal  6/8/24---Overnight Valium increased to q 4 and precedex drip started for CIWA score of 13 and not responding to prn ativan. Transferred to ICU  Precedex held due to hypotension.  Continue Prn ativan(increased to 2mg q 2hr) and scheduled valium(increased to 10mg q 4)  Received banana bag in the ED  Psych consult      Nausea and vomiting  Likely due to alcohol withdrawal  Will get repeat labs and monitor t bili---normal   If continues will consider further imaging     6/8----improved. No n/v      Unsteady gait  Likely related to alcohol use.  Recent MRI brain without acute stroke   CT head wnl   PT/OT today  Vitamin b1 recently wnl         COPD (chronic obstructive pulmonary disease)  Patient unable to get home inhalers and will discuss with pharmacy alternatives     Major depressive disorder  Continue home lexapro and aripiprazole       Tobacco abuse  Nicotine patch   Will discuss smoking cessation         VTE Risk " Mitigation (From admission, onward)           Ordered     IP VTE LOW RISK PATIENT  Once         06/06/24 0940     Place sequential compression device  Until discontinued         06/06/24 0940                    Discharge Planning   JORDAN:      Code Status: Full Code   Is the patient medically ready for discharge?:     Reason for patient still in hospital (select all that apply): Patient unstable  Discharge Plan A: Rehab   Discharge Delays: None known at this time        Critical care time spent on the evaluation and treatment of severe organ dysfunction, review of pertinent labs and imaging studies, discussions with consulting providers and discussions with patient/family: 50 minutes.      Jared Fierro MD  Department of Hospital Medicine   [place] [facility]

## 2024-06-08 NOTE — PROVIDER PROGRESS NOTES - EMERGENCY DEPT.
ER Physician Progress/Interval Note     This patient was admitted to the ICU last night for delirium tremens  Patient was given Ativan while on the floor awaiting to be transferred   Patient received additional Ativan while in ER as well as a Precedex drip  ER staff called to the ICU this morning due to increased sedation  ICU nurse had stopped the Precedex drip, patient becoming more alert  Blood pressure improved, patient was hallucinating and pulling at lines earlier  This patient appears to be going through a full-blown delirium tremens  Patient apparently drank a pt of vodka a day for over 30 years until 2 days ago  ICU nurse will hold the Precedex and sedation until blood pressure improves  Patient is in restraints, responding to stimuli,  significant sleep apnea noted  Daytime hospitalist will re-evaluate the patient and continue  sedation as needed       Monty Levin M.D. 6:28 AM 6/8/2024

## 2024-06-08 NOTE — ASSESSMENT & PLAN NOTE
6/8/24---Overnight Valium increased to q 4 and precedex drip started for CIWA score of 13 and not responding to prn ativan. Transferred to ICU  Precedex held due to hypotension.  Continue Prn ativan(increased to 2mg q 2hr) and scheduled valium(increased to 10mg q 4)  Received banana bag in the ED  Psych consult

## 2024-06-08 NOTE — PROVIDER PROGRESS NOTES - EMERGENCY DEPT.
ER Physician Progress/Interval Note     Hospitalist Dr. Jared Fierro called me regarding this patient at 2:30 a.m.  This patient was admitted yesterday to the hospital with delirium tremens  Patient has been on Valium on the floor as well as IV Ativan every 6 hours  Patient has been getting a little bit more agitated tonight & hallucinating now  After discussion with Dr. Fierro, I went & assessed the patient in room 301    The patient was directable but with a clear tremor, alert to date & time  However the patient was hallucinating earlier per floor nurse Abida  This patient appears to be going into worsening delirium tremens    I discussed the patient with the house supervisor as well as ICU nurse Alexx  Patient will be brought to the ICU with Ativan every 3 hours as needed in ICU  We will also start a Precedex drip to help the patient's delirium tremens tonight  We will hold off on using restraints until absolutely necessary if needed tonight    IV fluids, IV benzodiazepines, IV Precedex for delirium tremens in the ICU    Critical Care ED Physician Time (minutes):  -- Performed by: Monty Levin M.D.  -- Date/Time: 2:55 AM 6/8/2024   -- Direct Patient Care (Face Time): 15  -- Additional History from Records or Taking Additional History: 5  -- Ordering, Reviewing, and Interpreting Diagnostic Studies: 15  -- Total Time in Documentation: 15  -- Consultation with Other Physicians: 5  -- Consultation with Family Related to Condition: 0  -- Total Critical Care Time: 55  -- Critical care was necessary to treat the delirium tremens  -- Critical care was time spent personally by me on the following activities:   -- discussions with consultants regarding treatment plan today  -- development of treatment plan with patient & their family  -- examination of patient, ordering and performing treatments   -- review of radiographic studies, re-evaluation of pt's condition  -- review of labs and evaluation of response to  treatment         Monty Levin M.D. 2:52 AM 6/8/2024

## 2024-06-08 NOTE — PLAN OF CARE
The patient was transferred to ICU after showing s/s of increased ciwa score.  Patient became increasingly agitated throughout the night with restraints being initiated per MD protocol.  Blood pressures were somewhat low secondary to precedex gtt.  Addressed by Dr. Levin with orders for fluids.  Will continue to monitor.

## 2024-06-08 NOTE — PLAN OF CARE
Patient has remained confused throughout the day intermittently he is oriented but it is not for long. Patient requires frequent boundary setting that this is a no-smoking facility and he cannot leave it to go smoke either. Patient does have a 21mg nicotine patch in place. Patient has intermittently hallucinated today. Psychiatry evaluated patient again today giving updated recommendations the preference being to stay off the Precedex drip considering how strongly the patient reacted last night. We have been able to do that with the current ordered medications. Patient does remain restrained.    Problem: Adult Inpatient Plan of Care  Goal: Plan of Care Review  6/8/2024 1735 by aBtsheva Galindo RN  Outcome: Progressing  6/8/2024 1648 by Batsheva Galindo RN  Outcome: Progressing  Goal: Optimal Comfort and Wellbeing  6/8/2024 1735 by Batsheva Galindo RN  Outcome: Progressing  6/8/2024 1648 by Batsheva Galindo RN  Outcome: Progressing     Problem: Fall Injury Risk  Goal: Absence of Fall and Fall-Related Injury  6/8/2024 1735 by Batsheva Galindo RN  Outcome: Progressing  6/8/2024 1648 by Batsheva Galindo RN  Outcome: Progressing     Problem: Restraint, Nonviolent  Goal: Absence of Harm or Injury  6/8/2024 1735 by Batsheva Galindo RN  Outcome: Progressing  6/8/2024 1648 by Batsheva Galindo RN  Outcome: Progressing

## 2024-06-08 NOTE — SUBJECTIVE & OBJECTIVE
Past Medical History:   Diagnosis Date    Addiction to drug     Alcohol abuse     last drink 9/06/22    Anxiety     Bipolar disorder     COPD (chronic obstructive pulmonary disease)     Depression     Disc disorder     Fatigue     Headache     History of psychiatric hospitalization     Hx of psychiatric care     Panic disorder     Psychiatric problem     PTSD (post-traumatic stress disorder)     Sleep difficulties     Suicide attempt     Therapy     Withdrawal symptoms, alcohol        Past Surgical History:   Procedure Laterality Date    APPENDECTOMY      COLONOSCOPY      polyps    HERNIA REPAIR      x 2       Review of patient's allergies indicates:   Allergen Reactions    Diphenoxylate-atropine Shortness Of Breath and Other (See Comments)     Esophagus tightens       No current facility-administered medications on file prior to encounter.     Current Outpatient Medications on File Prior to Encounter   Medication Sig    albuterol (PROVENTIL/VENTOLIN HFA) 90 mcg/actuation inhaler Inhale 2 puffs into the lungs every 4 (four) hours as needed for Wheezing or Shortness of Breath.    ARIPiprazole (ABILIFY) 5 MG Tab Take 1 tablet (5 mg total) by mouth once daily.    EScitalopram oxalate (LEXAPRO) 20 MG tablet Take 1 tablet (20 mg total) by mouth once daily.    fluticasone propionate (FLOVENT HFA) 110 mcg/actuation inhaler Inhale 2 puffs into the lungs 2 (two) times daily. Controller    rOPINIRole (REQUIP) 1 MG tablet Take 1 tablet (1 mg total) by mouth every evening. (Patient taking differently: Take 2 mg by mouth every evening.)    tiotropium-olodateroL (STIOLTO RESPIMAT) 2.5-2.5 mcg/actuation Mist Inhale 2 puffs into the lungs once daily. Controller    cetirizine (ZYRTEC) 10 MG tablet Take 1 tablet (10 mg total) by mouth once daily.    LORazepam (ATIVAN) 0.5 MG tablet Take 1 tablet (0.5 mg total) by mouth 2 (two) times daily for 2 days, THEN 1 tablet (0.5 mg total) once daily for 2 days.     Family History       Problem  Relation (Age of Onset)    Alcohol abuse Maternal Grandfather, Paternal Grandfather    Anxiety disorder Mother    Depression Mother    Heart block Father    No Known Problems Sister, Brother          Tobacco Use    Smoking status: Every Day     Current packs/day: 1.00     Average packs/day: 1 pack/day for 38.4 years (38.4 ttl pk-yrs)     Types: Cigarettes     Start date: 1/8/1986    Smokeless tobacco: Never   Substance and Sexual Activity    Alcohol use: Yes     Comment: daily drinker--alcohol abuse    Drug use: Not Currently     Types: Marijuana    Sexual activity: Yes     Birth control/protection: None     Review of Systems   Constitutional:  Negative for chills, fatigue and fever.   HENT:  Negative for ear discharge and ear pain.    Eyes:  Negative for pain and discharge.   Respiratory:  Negative for cough and shortness of breath.    Cardiovascular:  Negative for chest pain and leg swelling.   Gastrointestinal:  Negative for abdominal pain, nausea and vomiting.   Endocrine: Negative for cold intolerance and heat intolerance.   Genitourinary:  Negative for difficulty urinating and dysuria.   Musculoskeletal:  Positive for gait problem. Negative for joint swelling and myalgias.   Skin:  Negative for rash and wound.   Neurological:  Positive for tremors. Negative for dizziness and headaches.   Psychiatric/Behavioral:  Positive for agitation. Negative for confusion.      Objective:     Vital Signs (Most Recent):  Temp: 98.1 °F (36.7 °C) (06/08/24 0732)  Pulse: 71 (06/08/24 0830)  Resp: (!) 25 (06/08/24 0830)  BP: 105/66 (06/08/24 0830)  SpO2: (!) 91 % (06/08/24 0830) Vital Signs (24h Range):  Temp:  [97.1 °F (36.2 °C)-98.2 °F (36.8 °C)] 98.1 °F (36.7 °C)  Pulse:  [] 71  Resp:  [15-43] 25  SpO2:  [91 %-100 %] 91 %  BP: ()/(51-92) 105/66     Weight: 81.6 kg (180 lb)  Body mass index is 24.41 kg/m².     Physical Exam  Constitutional:       General: He is not in acute distress.     Comments: 4 point  "restraints. Appears anxious. Slight tremor    HENT:      Head: Normocephalic and atraumatic.   Eyes:      General:         Right eye: No discharge.         Left eye: No discharge.   Cardiovascular:      Rate and Rhythm: Normal rate and regular rhythm.   Pulmonary:      Effort: Pulmonary effort is normal.      Breath sounds: Normal breath sounds.   Abdominal:      General: Bowel sounds are normal. There is no distension.      Tenderness: There is no abdominal tenderness.   Musculoskeletal:         General: No swelling or tenderness.      Cervical back: Neck supple. No tenderness.   Skin:     General: Skin is warm and dry.   Neurological:      General: No focal deficit present.      Mental Status: He is alert.      Comments: He is oriented to person only.  + tremors    Psychiatric:         Mood and Affect: Mood normal.         Behavior: Behavior normal.                Significant Labs: A1C:   Recent Labs   Lab 03/16/24  0613 06/06/24  1253   HGBA1C 5.2 5.2     ABGs: No results for input(s): "PH", "PCO2", "HCO3", "POCSATURATED", "BE", "TOTALHB", "COHB", "METHB", "O2HB", "POCFIO2", "PO2" in the last 48 hours.  Bilirubin:   Recent Labs   Lab 06/06/24  0457 06/07/24  0725   BILITOT 1.9* 0.9     Blood Culture: No results for input(s): "LABBLOO" in the last 48 hours.  BMP:   Recent Labs   Lab 06/07/24  0725   *      K 3.9   CL 96   CO2 27   BUN 23*   CREATININE 1.2   CALCIUM 10.2   MG 2.1     CBC:   Recent Labs   Lab 06/07/24  0725   WBC 5.94   HGB 14.5   HCT 42.7        CMP:   Recent Labs   Lab 06/07/24  0725      K 3.9   CL 96   CO2 27   *   BUN 23*   CREATININE 1.2   CALCIUM 10.2   PROT 7.2   ALBUMIN 4.1   BILITOT 0.9   ALKPHOS 68   AST 30   ALT 26   ANIONGAP 14     Cardiac Markers:   No results for input(s): "CKMB", "MYOGLOBIN", "BNP", "TROPISTAT" in the last 48 hours.    Coagulation: No results for input(s): "PT", "INR", "APTT" in the last 48 hours.  Lactic Acid: No results for " "input(s): "LACTATE" in the last 48 hours.  Lipase:   No results for input(s): "LIPASE" in the last 48 hours.    Lipid Panel: No results for input(s): "CHOL", "HDL", "LDLCALC", "TRIG", "CHOLHDL" in the last 48 hours.  Magnesium:   Recent Labs   Lab 06/07/24  0725   MG 2.1     POCT Glucose: No results for input(s): "POCTGLUCOSE" in the last 48 hours.  Prealbumin: No results for input(s): "PREALBUMIN" in the last 48 hours.  Respiratory Culture: No results for input(s): "GSRESP", "RESPIRATORYC" in the last 48 hours.  Troponin:   No results for input(s): "TROPONINI", "TROPONINIHS" in the last 48 hours.    TSH:   Recent Labs   Lab 01/15/24  0906   TSH 0.926     Urine Culture: No results for input(s): "LABURIN" in the last 48 hours.  Urine Studies: No results for input(s): "COLORU", "APPEARANCEUA", "PHUR", "SPECGRAV", "PROTEINUA", "GLUCUA", "KETONESU", "BILIRUBINUA", "OCCULTUA", "NITRITE", "UROBILINOGEN", "LEUKOCYTESUR", "RBCUA", "WBCUA", "BACTERIA", "SQUAMEPITHEL", "HYALINECASTS" in the last 48 hours.    Invalid input(s): "WRIGHTSUR"    Significant Imaging: I have reviewed all pertinent imaging results/findings within the past 24 hours.  "

## 2024-06-09 LAB
ALBUMIN SERPL BCP-MCNC: 3.5 G/DL (ref 3.5–5.2)
ALP SERPL-CCNC: 65 U/L (ref 55–135)
ALT SERPL W/O P-5'-P-CCNC: 27 U/L (ref 10–44)
ANION GAP SERPL CALC-SCNC: 11 MMOL/L (ref 8–16)
AST SERPL-CCNC: 25 U/L (ref 10–40)
BILIRUB SERPL-MCNC: 0.6 MG/DL (ref 0.1–1)
BUN SERPL-MCNC: 11 MG/DL (ref 6–20)
CALCIUM SERPL-MCNC: 9.7 MG/DL (ref 8.7–10.5)
CHLORIDE SERPL-SCNC: 107 MMOL/L (ref 95–110)
CO2 SERPL-SCNC: 21 MMOL/L (ref 23–29)
CREAT SERPL-MCNC: 0.8 MG/DL (ref 0.5–1.4)
EST. GFR  (NO RACE VARIABLE): >60 ML/MIN/1.73 M^2
GLUCOSE SERPL-MCNC: 86 MG/DL (ref 70–110)
POTASSIUM SERPL-SCNC: 3.4 MMOL/L (ref 3.5–5.1)
PROT SERPL-MCNC: 6.2 G/DL (ref 6–8.4)
SODIUM SERPL-SCNC: 139 MMOL/L (ref 136–145)

## 2024-06-09 PROCEDURE — S4991 NICOTINE PATCH NONLEGEND: HCPCS | Performed by: FAMILY MEDICINE

## 2024-06-09 PROCEDURE — 99232 SBSQ HOSP IP/OBS MODERATE 35: CPT | Mod: ,,, | Performed by: FAMILY MEDICINE

## 2024-06-09 PROCEDURE — 25000003 PHARM REV CODE 250: Performed by: PSYCHIATRY & NEUROLOGY

## 2024-06-09 PROCEDURE — 25000003 PHARM REV CODE 250: Performed by: PHYSICIAN ASSISTANT

## 2024-06-09 PROCEDURE — 25000003 PHARM REV CODE 250: Performed by: STUDENT IN AN ORGANIZED HEALTH CARE EDUCATION/TRAINING PROGRAM

## 2024-06-09 PROCEDURE — 99233 SBSQ HOSP IP/OBS HIGH 50: CPT | Mod: AF,HB,, | Performed by: PSYCHIATRY & NEUROLOGY

## 2024-06-09 PROCEDURE — 80053 COMPREHEN METABOLIC PANEL: CPT | Performed by: FAMILY MEDICINE

## 2024-06-09 PROCEDURE — 25000003 PHARM REV CODE 250: Performed by: FAMILY MEDICINE

## 2024-06-09 PROCEDURE — 94761 N-INVAS EAR/PLS OXIMETRY MLT: CPT

## 2024-06-09 PROCEDURE — 99900031 HC PATIENT EDUCATION (STAT)

## 2024-06-09 PROCEDURE — 21400001 HC TELEMETRY ROOM

## 2024-06-09 PROCEDURE — 36415 COLL VENOUS BLD VENIPUNCTURE: CPT | Performed by: FAMILY MEDICINE

## 2024-06-09 PROCEDURE — 99900035 HC TECH TIME PER 15 MIN (STAT)

## 2024-06-09 PROCEDURE — 63600175 PHARM REV CODE 636 W HCPCS: Performed by: STUDENT IN AN ORGANIZED HEALTH CARE EDUCATION/TRAINING PROGRAM

## 2024-06-09 RX ORDER — TALC
6 POWDER (GRAM) TOPICAL NIGHTLY PRN
Status: DISCONTINUED | OUTPATIENT
Start: 2024-06-09 | End: 2024-06-09 | Stop reason: SDUPTHER

## 2024-06-09 RX ORDER — LISINOPRIL 10 MG/1
10 TABLET ORAL DAILY
Status: DISCONTINUED | OUTPATIENT
Start: 2024-06-09 | End: 2024-06-11 | Stop reason: HOSPADM

## 2024-06-09 RX ADMIN — THERA TABS 1 TABLET: TAB at 08:06

## 2024-06-09 RX ADMIN — LISINOPRIL 10 MG: 10 TABLET ORAL at 02:06

## 2024-06-09 RX ADMIN — ESCITALOPRAM 20 MG: 20 TABLET, FILM COATED ORAL at 08:06

## 2024-06-09 RX ADMIN — ROPINIROLE HYDROCHLORIDE 1 MG: 1 TABLET, FILM COATED ORAL at 08:06

## 2024-06-09 RX ADMIN — DIAZEPAM 10 MG: 10 TABLET ORAL at 03:06

## 2024-06-09 RX ADMIN — DIAZEPAM 10 MG: 10 TABLET ORAL at 08:06

## 2024-06-09 RX ADMIN — DIAZEPAM 10 MG: 10 TABLET ORAL at 12:06

## 2024-06-09 RX ADMIN — RISPERIDONE 0.5 MG: 0.5 TABLET, FILM COATED ORAL at 08:06

## 2024-06-09 RX ADMIN — DIAZEPAM 10 MG: 10 TABLET ORAL at 11:06

## 2024-06-09 RX ADMIN — NICOTINE 1 PATCH: 21 PATCH, EXTENDED RELEASE TRANSDERMAL at 08:06

## 2024-06-09 RX ADMIN — ONDANSETRON 4 MG: 2 INJECTION INTRAMUSCULAR; INTRAVENOUS at 06:06

## 2024-06-09 RX ADMIN — Medication 100 MG: at 08:06

## 2024-06-09 RX ADMIN — PROCHLORPERAZINE EDISYLATE 2.5 MG: 5 INJECTION INTRAMUSCULAR; INTRAVENOUS at 11:06

## 2024-06-09 RX ADMIN — Medication 6 MG: at 08:06

## 2024-06-09 RX ADMIN — SODIUM CHLORIDE 1000 ML: 9 INJECTION, SOLUTION INTRAVENOUS at 01:06

## 2024-06-09 RX ADMIN — MUPIROCIN: 20 OINTMENT TOPICAL at 08:06

## 2024-06-09 RX ADMIN — FOLIC ACID 1 MG: 1 TABLET ORAL at 08:06

## 2024-06-09 RX ADMIN — DIAZEPAM 10 MG: 10 TABLET ORAL at 04:06

## 2024-06-09 RX ADMIN — PANTOPRAZOLE SODIUM 40 MG: 40 TABLET, DELAYED RELEASE ORAL at 08:06

## 2024-06-09 NOTE — PROGRESS NOTES
PSYCHIATRY CONSULT PROGRESS NOTE  SUBSEQUENT HOSPITAL VISIT    DATE OF ADMISSION: 6/6/2024  4:36 AM  LENGTH OF STAY: 3 days    HISTORY  Maicol Millan III is a 53 y.o. male, currently in ICU for Alcohol and now in DTs    Interim Events  The patient was seen and examined. The chart was reviewed. Staff gave report. Patient has not been confused and agitated with hallucinating to extent of prn being required. No Ativan prn for w/d symptoms required. VSS. He is alert and oriented to situation. He states that he has been emotional distrught that he has episode of feeling off balance and possibly blacking out without medical explanation. Discussed and he agrees that cessation of alcohol will prevent the clinical picture from being further complicated. States he has been having passive due to the combination of frustration with medical illness and alcohol addiction. He is focused on inpt tx. States that he slept well, but general needs sleep medication. Denies diaphoresis, N/V/D today. Or confusion.     ROS  General ROS: negative  Ophthalmic ROS: negative  ENT ROS: negative  Respiratory ROS: no cough, shortness of breath, or wheezing  Cardiovascular ROS: no chest pain or dyspnea on exertion  Gastrointestinal ROS: GI distress   Genito-Urinary ROS: no dysuria, trouble voiding, or hematuria  Musculoskeletal ROS: negative  Neurological ROS: dull headache     DIAGNOSTIC TESTING   Laboratory Results  Recent Results (from the past 24 hour(s))   Comprehensive metabolic panel    Collection Time: 06/09/24  5:04 AM   Result Value Ref Range    Sodium 139 136 - 145 mmol/L    Potassium 3.4 (L) 3.5 - 5.1 mmol/L    Chloride 107 95 - 110 mmol/L    CO2 21 (L) 23 - 29 mmol/L    Glucose 86 70 - 110 mg/dL    BUN 11 6 - 20 mg/dL    Creatinine 0.8 0.5 - 1.4 mg/dL    Calcium 9.7 8.7 - 10.5 mg/dL    Total Protein 6.2 6.0 - 8.4 g/dL    Albumin 3.5 3.5 - 5.2 g/dL    Total Bilirubin 0.6 0.1 - 1.0 mg/dL    Alkaline Phosphatase 65 55 - 135 U/L     AST 25 10 - 40 U/L    ALT 27 10 - 44 U/L    eGFR >60 >60 mL/min/1.73 m^2    Anion Gap 11 8 - 16 mmol/L       VITALS   Vitals:    06/09/24 1530   BP: (!) 147/81   Pulse: 94   Resp: 20   Temp: 97.8 °F (36.6 °C)        Gait and Station: in bed  Involuntary Movements: reduced tremor--mild fine tremor of bilateral hands  Muscle Tone: no gross abnml    MSE:   Cognition: Alert and oriented to person, [place], situation  Memory: intact to conversation  Attention: intact to conversation   Appearance: stated age, hospital gown, well groomed  Attitude: calm cooperative  Behavior  psychomotor agitation/tremor of bilateral hands good eye contact  Speech normal rate, tone, volume, conversational.    Language fluent English.   Affect euthymic, constricted, reactive  Thought process: linear and logical  Thought content: Passive suicidal denies HI  Denies auditory hallucinations, paranoid ideation, ideas of reference.    Insight good  Judgment good    Assessment and Plan:    Alcohol Withdrawal Delirium-severe (Delirium Tremens)   -Vitamin supplements  -CIWA and q2 vitals   -Seizure precautions  -Valium 10mg q4---Likely to be able to start tapering within next 24 hours  -Ativan 2mg IV prn SBP>160, DBP>100, HR>100, CIWA>8     -Risperdal 0.5mg PO BID   -Haldol 5mg IV x 1 dose for agitation/hallucinations was partial effective   -Haldol 10mg PO q6 prn agitation or Haldol 10mg IV q8 prn agitation   -Alert provider when Haldol is given so that treatment progress is being closely monitored    Insomnia   Melatonin 6mg PO qhs prn insomnia    SI  - recommend PEC and admit to U once medically stable  - provide psychotherapeutic interventions and medication management     MDD  - continue lexapro 10 mg pO qd  - Switch to Risperdal for delirium but will likely benefit from switch back to Abilify when medical stable  - pt counseled  - follow up with outpatient mental health providers after discharge for medication management and psychotherapy      Anxiety  - lexapro as above     PTSD   - lexapro as above    Chart and Labs reviewed. Discussed progress and ongoing symptoms with clinical team.    Disp: Once medically stable from Alcohol withdrawal and delirium transfer to in level of psychiatric care    STAFF:   Zohreh Interiano MD  Psychiatry

## 2024-06-09 NOTE — PROGRESS NOTES
[facility] Medicine  Progress Note    Patient Name: Maicol Millan III  MRN: [id number]  Patient Class: IP- Inpatient   Admission Date: 6/6/2024  Length of Stay: 3 days  Attending Physician: Francisco Diaz MD  Primary Care Provider: Manisha Hopkins NP        Subjective:     Principal Problem:Alcohol withdrawal        HPI:  Patient is a 53 year old male with medical history of alcohol use disorder (1.75 L of alcohol a day), tobacco use disorder, COPD, anxiety and depression who presented to the ED with unsteady gait.  This has be intermittent for some time and had MRI 3/2024.  He usually gets bilateral tunnel vision and then has unsteadiness of his feet.  Additionally he is going through alcohol withdraw since he stopped trying to drink alcohol 2 days ago.    Admitted for alcohol withdrawal.      Overview/Hospital Course:  PT HD stable on room air.  Still tachycardia but appears more relaxed today. Nausea and vomiting improved with compazine. Repeat labs pending.  T bili elevated yesterday.  Will give one dose of ativan now and continue valium 10mg q6 for another 24 hours. He will likely need a slower taper due to the amount of his alcohol use.      6/8/24  Pt with concerning sx's of ETOH withdrawal last night. CIWA score of 13. Prn ativan was not working. He was given an extra 4mg of ativan IV, Valium increased to q4 scheduled, Ativan increased to 2mg q 2, and transferred to the ICU to start precedex drip overnight\. He did not tolerate the precedex---hypotension. Required rapid response and IV fluid bolus. Precedex has since been held. This am he is in restraints but relatively calm and cooperative since getting his scheduled po valium about an hour ago. Vital signs are normal, stable.      6/9/24  Doing much better this am. Slept well overnight   His CIWA score is 6 this morning   Dr. Interiano has seen patient and made medication recommendations with parameters for prn ativan  (which he has not had to use since yesterday) and she changed his abilify to Risperdal until delerium under better control.   Will downgrade patient to floor this morning        Interval History: see HC     Review of Systems   Constitutional:  Negative for fever.   Respiratory:  Negative for shortness of breath.    Cardiovascular:  Negative for chest pain.   Gastrointestinal:  Negative for anal bleeding and blood in stool.   Genitourinary:  Negative for dysuria.   Neurological:  Negative for dizziness and light-headedness.   Psychiatric/Behavioral:  Negative for agitation and hallucinations.      Objective:     Vital Signs (Most Recent):  Temp: 98.1 °F (36.7 °C) (06/09/24 0705)  Pulse: 93 (06/09/24 0900)  Resp: (!) 24 (06/09/24 0900)  BP: (!) 144/78 (06/09/24 0900)  SpO2: 99 % (06/09/24 0900) Vital Signs (24h Range):  Temp:  [97.4 °F (36.3 °C)-98.6 °F (37 °C)] 98.1 °F (36.7 °C)  Pulse:  [76-98] 93  Resp:  [14-44] 24  SpO2:  [94 %-100 %] 99 %  BP: ()/(53-82) 144/78     Weight: 81.6 kg (180 lb)  Body mass index is 24.41 kg/m².    Intake/Output Summary (Last 24 hours) at 6/9/2024 0941  Last data filed at 6/9/2024 0829  Gross per 24 hour   Intake 4491.76 ml   Output 600 ml   Net 3891.76 ml         Physical Exam  Constitutional:       Appearance: Normal appearance. He is well-developed. He is not ill-appearing.   HENT:      Head: Normocephalic and atraumatic.      Right Ear: External ear normal.      Left Ear: External ear normal.      Nose: Nose normal.      Mouth/Throat:      Mouth: Mucous membranes are moist.      Pharynx: No oropharyngeal exudate or posterior oropharyngeal erythema.   Eyes:      General: No scleral icterus.        Right eye: No discharge.         Left eye: No discharge.      Conjunctiva/sclera: Conjunctivae normal.      Pupils: Pupils are equal, round, and reactive to light.   Neck:      Thyroid: No thyromegaly.      Vascular: No JVD.      Trachea: No tracheal deviation.   Cardiovascular:       Rate and Rhythm: Normal rate and regular rhythm.      Heart sounds: Normal heart sounds. No murmur heard.  Pulmonary:      Effort: Pulmonary effort is normal. No respiratory distress.      Breath sounds: Normal breath sounds. No wheezing or rales.   Chest:      Chest wall: No tenderness.   Abdominal:      General: Bowel sounds are normal. There is no distension.      Palpations: Abdomen is soft. There is no mass.      Tenderness: There is no abdominal tenderness. There is no guarding or rebound.   Musculoskeletal:         General: Normal range of motion.      Cervical back: Normal range of motion and neck supple.      Right lower leg: No edema.      Left lower leg: No edema.   Lymphadenopathy:      Cervical: No cervical adenopathy.   Skin:     General: Skin is warm and dry.   Neurological:      Mental Status: He is alert and oriented to person, place, and time.      Cranial Nerves: No cranial nerve deficit.      Motor: No abnormal muscle tone.      Coordination: Coordination normal.      Deep Tendon Reflexes: Reflexes are normal and symmetric. Reflexes normal.   Psychiatric:         Behavior: Behavior normal.         Thought Content: Thought content normal.         Judgment: Judgment normal.             Significant Labs: All pertinent labs within the past 24 hours have been reviewed.  BMP:   Recent Labs   Lab 06/09/24  0504   GLU 86      K 3.4*      CO2 21*   BUN 11   CREATININE 0.8   CALCIUM 9.7       Significant Imaging: I have reviewed all pertinent imaging results/findings within the past 24 hours.    Assessment/Plan:      * Alcohol withdrawal  6/8/24---Overnight Valium increased to q 4 and precedex drip started for CIWA score of 13 and not responding to prn ativan. Transferred to ICU  Precedex held due to hypotension.  Continue Prn ativan(increased to 2mg q 2hr) and scheduled valium(increased to 10mg q 4)  Received banana bag in the ED  Psych consult    6/9  Psych has seen pt----Dr. Interiano's help is  very much appreciated.   Currently on Valium 10mg q4.  Ativan 2mg prn CIWA >8 (hasn't used since yesterday).   Risperdal 0.5mg BID   Will downgrade to floor but will not start to taper his BZD until tomorrow since this is the first time since Friday that we have his alcohol withdrawal sx's under control         Nausea and vomiting  Likely due to alcohol withdrawal  Will get repeat labs and monitor t bili---normal   If continues will consider further imaging     6/8----improved. No n/v      Unsteady gait  Likely related to alcohol use.  Recent MRI brain without acute stroke   CT head wnl   PT/OT today  Vitamin b1 recently wnl         COPD (chronic obstructive pulmonary disease)  Patient unable to get home inhalers and will discuss with pharmacy alternatives     Major depressive disorder  Continue home lexapro and put him back on aripiprazole once he is out of withdrawal (using Risperdal currently for delerium related withdrawal symptoms)       Tobacco abuse  Nicotine patch   Will discuss smoking cessation         VTE Risk Mitigation (From admission, onward)           Ordered     IP VTE LOW RISK PATIENT  Once         06/06/24 0940     Place sequential compression device  Until discontinued         06/06/24 0940                    Discharge Planning   JORDAN:      Code Status: Full Code   Is the patient medically ready for discharge?:     Reason for patient still in hospital (select all that apply): Treatment and Pending disposition  Discharge Plan A: Rehab   Discharge Delays: None known at this time        Critical care time spent on the evaluation and treatment of severe organ dysfunction, review of pertinent labs and imaging studies, discussions with consulting providers and discussions with patient/family: 35 minutes.      Jared Fierro MD  Department of Hospital Medicine   [place] - Intensive Care

## 2024-06-09 NOTE — SUBJECTIVE & OBJECTIVE
Interval History: see      Review of Systems   Constitutional:  Negative for fever.   Respiratory:  Negative for shortness of breath.    Cardiovascular:  Negative for chest pain.   Gastrointestinal:  Negative for anal bleeding and blood in stool.   Genitourinary:  Negative for dysuria.   Neurological:  Negative for dizziness and light-headedness.   Psychiatric/Behavioral:  Negative for agitation and hallucinations.      Objective:     Vital Signs (Most Recent):  Temp: 98.1 °F (36.7 °C) (06/09/24 0705)  Pulse: 93 (06/09/24 0900)  Resp: (!) 24 (06/09/24 0900)  BP: (!) 144/78 (06/09/24 0900)  SpO2: 99 % (06/09/24 0900) Vital Signs (24h Range):  Temp:  [97.4 °F (36.3 °C)-98.6 °F (37 °C)] 98.1 °F (36.7 °C)  Pulse:  [76-98] 93  Resp:  [14-44] 24  SpO2:  [94 %-100 %] 99 %  BP: ()/(53-82) 144/78     Weight: 81.6 kg (180 lb)  Body mass index is 24.41 kg/m².    Intake/Output Summary (Last 24 hours) at 6/9/2024 0941  Last data filed at 6/9/2024 0829  Gross per 24 hour   Intake 4491.76 ml   Output 600 ml   Net 3891.76 ml         Physical Exam  Constitutional:       Appearance: Normal appearance. He is well-developed. He is not ill-appearing.   HENT:      Head: Normocephalic and atraumatic.      Right Ear: External ear normal.      Left Ear: External ear normal.      Nose: Nose normal.      Mouth/Throat:      Mouth: Mucous membranes are moist.      Pharynx: No oropharyngeal exudate or posterior oropharyngeal erythema.   Eyes:      General: No scleral icterus.        Right eye: No discharge.         Left eye: No discharge.      Conjunctiva/sclera: Conjunctivae normal.      Pupils: Pupils are equal, round, and reactive to light.   Neck:      Thyroid: No thyromegaly.      Vascular: No JVD.      Trachea: No tracheal deviation.   Cardiovascular:      Rate and Rhythm: Normal rate and regular rhythm.      Heart sounds: Normal heart sounds. No murmur heard.  Pulmonary:      Effort: Pulmonary effort is normal. No respiratory  distress.      Breath sounds: Normal breath sounds. No wheezing or rales.   Chest:      Chest wall: No tenderness.   Abdominal:      General: Bowel sounds are normal. There is no distension.      Palpations: Abdomen is soft. There is no mass.      Tenderness: There is no abdominal tenderness. There is no guarding or rebound.   Musculoskeletal:         General: Normal range of motion.      Cervical back: Normal range of motion and neck supple.      Right lower leg: No edema.      Left lower leg: No edema.   Lymphadenopathy:      Cervical: No cervical adenopathy.   Skin:     General: Skin is warm and dry.   Neurological:      Mental Status: He is alert and oriented to person, place, and time.      Cranial Nerves: No cranial nerve deficit.      Motor: No abnormal muscle tone.      Coordination: Coordination normal.      Deep Tendon Reflexes: Reflexes are normal and symmetric. Reflexes normal.   Psychiatric:         Behavior: Behavior normal.         Thought Content: Thought content normal.         Judgment: Judgment normal.             Significant Labs: All pertinent labs within the past 24 hours have been reviewed.  BMP:   Recent Labs   Lab 06/09/24  0504   GLU 86      K 3.4*      CO2 21*   BUN 11   CREATININE 0.8   CALCIUM 9.7       Significant Imaging: I have reviewed all pertinent imaging results/findings within the past 24 hours.

## 2024-06-09 NOTE — PLAN OF CARE
Restraints dc'd at 7 pm.  The patient remained calm throughout the night without any complaints.  Valium given as ordered.  There was no need for any prn anxiety meds last night. CIWA score remains at 4

## 2024-06-09 NOTE — EICU
Intervention Initiated From:  COR / DILCIA Esquivel intervened regarding:  Rounding (Video assessment)    Nurse Notified:  No    Doctor Notified:  No    Comments:

## 2024-06-09 NOTE — ASSESSMENT & PLAN NOTE
6/8/24---Overnight Valium increased to q 4 and precedex drip started for CIWA score of 13 and not responding to prn ativan. Transferred to ICU  Precedex held due to hypotension.  Continue Prn ativan(increased to 2mg q 2hr) and scheduled valium(increased to 10mg q 4)  Received banana bag in the ED  Psych consult    6/9  Psych has seen pt----Dr. Interiano's help is very much appreciated.   Currently on Valium 10mg q4.  Ativan 2mg prn CIWA >8 (hasn't used since yesterday).   Risperdal 0.5mg BID   Will downgrade to floor but will not start to taper his BZD until tomorrow since this is the first time since Friday that we have his alcohol withdrawal sx's under control

## 2024-06-09 NOTE — EICU
Intervention Initiated From:  COR / DILCIA    Sierra intervened regarding:  Rounding (Video assessment)    Nurse Notified:  Yes    Doctor Notified:  No    Comments: Nurse in room. VSS. No needs at this time.

## 2024-06-09 NOTE — PLAN OF CARE
Problem: Adult Inpatient Plan of Care  Goal: Plan of Care Review  Outcome: Progressing  Goal: Patient-Specific Goal (Individualized)  Outcome: Progressing  Goal: Absence of Hospital-Acquired Illness or Injury  Outcome: Progressing  Goal: Optimal Comfort and Wellbeing  Outcome: Progressing  Goal: Readiness for Transition of Care  Outcome: Progressing     Problem: Excessive Substance Use  Goal: Optimized Energy Level (Excessive Substance Use)  Outcome: Progressing  Goal: Improved Behavioral Control (Excessive Substance Use)  Outcome: Progressing  Goal: Increased Participation and Engagement (Excessive Substance Use)  Outcome: Progressing  Goal: Improved Physiologic Symptoms (Excessive Substance Use)  Outcome: Progressing  Goal: Enhanced Social, Occupational or Functional Skills (Excessive Substance Use)  Outcome: Progressing     Problem: Fall Injury Risk  Goal: Absence of Fall and Fall-Related Injury  Outcome: Progressing     Problem: Fall Injury Risk  Goal: Absence of Fall and Fall-Related Injury  Outcome: Progressing

## 2024-06-09 NOTE — ASSESSMENT & PLAN NOTE
Likely due to alcohol withdrawal  Will get repeat labs and monitor t bili---normal   If continues will consider further imaging     6/8----improved. No n/v

## 2024-06-09 NOTE — ASSESSMENT & PLAN NOTE
Continue home lexapro and put him back on aripiprazole once he is out of withdrawal (using Risperdal currently for delerium related withdrawal symptoms)

## 2024-06-10 LAB
ANION GAP SERPL CALC-SCNC: 11 MMOL/L (ref 8–16)
BUN SERPL-MCNC: 11 MG/DL (ref 6–20)
CALCIUM SERPL-MCNC: 10.1 MG/DL (ref 8.7–10.5)
CHLORIDE SERPL-SCNC: 102 MMOL/L (ref 95–110)
CO2 SERPL-SCNC: 27 MMOL/L (ref 23–29)
CREAT SERPL-MCNC: 0.9 MG/DL (ref 0.5–1.4)
EST. GFR  (NO RACE VARIABLE): >60 ML/MIN/1.73 M^2
GLUCOSE SERPL-MCNC: 149 MG/DL (ref 70–110)
POTASSIUM SERPL-SCNC: 4 MMOL/L (ref 3.5–5.1)
SODIUM SERPL-SCNC: 140 MMOL/L (ref 136–145)

## 2024-06-10 PROCEDURE — 94761 N-INVAS EAR/PLS OXIMETRY MLT: CPT

## 2024-06-10 PROCEDURE — 25000003 PHARM REV CODE 250: Performed by: FAMILY MEDICINE

## 2024-06-10 PROCEDURE — 25000003 PHARM REV CODE 250: Performed by: PSYCHIATRY & NEUROLOGY

## 2024-06-10 PROCEDURE — 99900035 HC TECH TIME PER 15 MIN (STAT)

## 2024-06-10 PROCEDURE — 36415 COLL VENOUS BLD VENIPUNCTURE: CPT | Performed by: INTERNAL MEDICINE

## 2024-06-10 PROCEDURE — 99233 SBSQ HOSP IP/OBS HIGH 50: CPT | Mod: ,,, | Performed by: INTERNAL MEDICINE

## 2024-06-10 PROCEDURE — 25000003 PHARM REV CODE 250: Performed by: STUDENT IN AN ORGANIZED HEALTH CARE EDUCATION/TRAINING PROGRAM

## 2024-06-10 PROCEDURE — 63600175 PHARM REV CODE 636 W HCPCS: Performed by: STUDENT IN AN ORGANIZED HEALTH CARE EDUCATION/TRAINING PROGRAM

## 2024-06-10 PROCEDURE — 21400001 HC TELEMETRY ROOM

## 2024-06-10 PROCEDURE — 99232 SBSQ HOSP IP/OBS MODERATE 35: CPT | Mod: AF,HB,, | Performed by: STUDENT IN AN ORGANIZED HEALTH CARE EDUCATION/TRAINING PROGRAM

## 2024-06-10 PROCEDURE — 25000003 PHARM REV CODE 250: Performed by: PHYSICIAN ASSISTANT

## 2024-06-10 PROCEDURE — 80048 BASIC METABOLIC PNL TOTAL CA: CPT | Performed by: INTERNAL MEDICINE

## 2024-06-10 PROCEDURE — S4991 NICOTINE PATCH NONLEGEND: HCPCS | Performed by: FAMILY MEDICINE

## 2024-06-10 RX ORDER — HYDROXYZINE PAMOATE 25 MG/1
50 CAPSULE ORAL NIGHTLY PRN
Status: DISCONTINUED | OUTPATIENT
Start: 2024-06-10 | End: 2024-06-11

## 2024-06-10 RX ADMIN — DIAZEPAM 10 MG: 10 TABLET ORAL at 11:06

## 2024-06-10 RX ADMIN — HALOPERIDOL 10 MG: 5 TABLET ORAL at 12:06

## 2024-06-10 RX ADMIN — NICOTINE 1 PATCH: 21 PATCH, EXTENDED RELEASE TRANSDERMAL at 08:06

## 2024-06-10 RX ADMIN — DIAZEPAM 10 MG: 10 TABLET ORAL at 08:06

## 2024-06-10 RX ADMIN — DIAZEPAM 10 MG: 10 TABLET ORAL at 04:06

## 2024-06-10 RX ADMIN — Medication 100 MG: at 08:06

## 2024-06-10 RX ADMIN — PANTOPRAZOLE SODIUM 40 MG: 40 TABLET, DELAYED RELEASE ORAL at 08:06

## 2024-06-10 RX ADMIN — MUPIROCIN: 20 OINTMENT TOPICAL at 08:06

## 2024-06-10 RX ADMIN — HYDROXYZINE PAMOATE 50 MG: 25 CAPSULE ORAL at 08:06

## 2024-06-10 RX ADMIN — THERA TABS 1 TABLET: TAB at 08:06

## 2024-06-10 RX ADMIN — RISPERIDONE 0.5 MG: 0.5 TABLET, FILM COATED ORAL at 08:06

## 2024-06-10 RX ADMIN — PROCHLORPERAZINE EDISYLATE 2.5 MG: 5 INJECTION INTRAMUSCULAR; INTRAVENOUS at 08:06

## 2024-06-10 RX ADMIN — LOPERAMIDE HYDROCHLORIDE 2 MG: 2 CAPSULE ORAL at 12:06

## 2024-06-10 RX ADMIN — LOPERAMIDE HYDROCHLORIDE 2 MG: 2 CAPSULE ORAL at 08:06

## 2024-06-10 RX ADMIN — ESCITALOPRAM 20 MG: 20 TABLET, FILM COATED ORAL at 08:06

## 2024-06-10 RX ADMIN — FOLIC ACID 1 MG: 1 TABLET ORAL at 08:06

## 2024-06-10 NOTE — NURSING
Dr. Gottlieb notified of CIWA score of 22. Pt appears anxious at this time. Pt admits to depression, but denies any SI or HI.     Pt still continues with nausea and diarrhea.

## 2024-06-10 NOTE — PROGRESS NOTES
[place] - Med Surg (3rd Fl)  Adult Nutrition  Progress Note    SUMMARY     Recommendations  1. Continue regular diet.   2. Boost Plus TID.  3. Obtain new weight.  4. RD to follow.    Goals: Patient will meet at least 75% ENN prior to RD follow up.  Nutrition Goal Status: goal met    Communication of RD Recs: other (comment) (POC)    Assessment and Plan    Nutrition Problem  Increased nutritional needs    Related to (etiology):   Excessive alcohol intake    Signs and Symptoms (as evidenced by):   ETOH withdrawal  Self-reported daily consumption of 1.75 L of vodka   Decreased appetite, unintentional weight loss    Interventions/Recommendations (treatment strategy):  Interventions  1. Commercial beverage  2. Collaboration with other providers    Recommendations  1. Continue regular diet.   2. Boost Plus TID.  3. Obtain new weight.  4. RD to follow.    Nutrition Diagnosis Status:   Improving      Malnutrition Assessment               No NFPE - RD remote                        Reason for Assessment    Reason For Assessment: RD follow up  Diagnosis: other (see comments) (alcohol withdrawal)  Past Medical History:   Diagnosis Date    Addiction to drug     Alcohol abuse     last drink 9/06/22    Anxiety     Bipolar disorder     COPD (chronic obstructive pulmonary disease)     Depression     Disc disorder     Fatigue     Headache     History of psychiatric hospitalization     Hx of psychiatric care     Panic disorder     Psychiatric problem     PTSD (post-traumatic stress disorder)     Sleep difficulties     Suicide attempt     Therapy     Withdrawal symptoms, alcohol      Interdisciplinary Rounds: did not attend  General Information Comments:     6/10/24  Pt continues with adequate appetite. % recent meal intakes per chart plus Boost consumption. Meeting ENN. Noted patient with nausea and diarrhea. Pt states this may be due to over-eating. He does have significant kcal and protein needs due to height and ETOH abuse  alone. Asked patient if he wanted to hold ONS for 1 day to see if symptoms continue. Pt refused and explained that he drinks Boost regularly at home. PRN meds given at this time for GI symptoms. Recommend obtaining new weight for patient. Patient states weight was a estimated weight. Will continue to monitor.     Nutrition Discharge Planning: Pt to dc on general healthful diet    Nutrition Risk Screen    Nutrition Risk Screen: no indicators present    Nutrition/Diet History    Spiritual, Cultural Beliefs, Sabianism Practices, Values that Affect Care: no  Food Allergies: NKFA  Factors Affecting Nutritional Intake: excessive alcohol intake, decreased appetite, depression    Nutrition Related Social Determinants of Health: SDOH: Unable to assess at this time.       Anthropometrics    Temp: 98 °F (36.7 °C)  Height Method: Stated  Height: 6' (182.9 cm)  Height (inches): 72 in  Weight Method: Standard Scale  Weight: 81.6 kg (180 lb)  Weight (lb): 180 lb  Ideal Body Weight (IBW), Male: 178 lb  % Ideal Body Weight, Male (lb): 101.12 %  BMI (Calculated): 24.4  BMI Grade: 18.5-24.9 - normal       Lab/Procedures/Meds    Pertinent Labs Reviewed: reviewed  Pertinent Labs Comments: glucose: 149 (H)  Pertinent Medications Reviewed: reviewed  Scheduled:   diazePAM  10 mg Oral Q4H    EScitalopram oxalate  20 mg Oral Daily    folic acid  1 mg Oral Daily    lisinopriL  10 mg Oral Daily    multivitamin  1 tablet Oral Daily    mupirocin   Nasal BID    nicotine  1 patch Transdermal Daily    pantoprazole  40 mg Oral Daily    risperiDONE  0.5 mg Oral BID    thiamine  100 mg Oral Daily       Current Facility-Administered Medications:     aluminum & magnesium hydroxide-simethicone, 30 mL, Oral, Q6H PRN    haloperidol lactate, 10 mg, Intravenous, Q8H PRN    haloperidoL, 10 mg, Oral, Q6H PRN    loperamide, 2 mg, Oral, QID PRN    lorazepam, 2 mg, Intravenous, Q2H PRN    melatonin, 6 mg, Oral, Nightly PRN    ondansetron, 4 mg, Intravenous, Q6H  PRN    prochlorperazine, 2.5 mg, Intravenous, Q6H PRN    sodium chloride 0.9%, 10 mL, Intravenous, PRN    Physical Findings/Assessment         Estimated/Assessed Needs    Weight Used For Calorie Calculations: 80.9 kg (178 lb 5.6 oz) (IBW)  Energy Calorie Requirements (kcal): 1888-4884 (30-35 kcal/kg)  Energy Need Method: Kcal/kg  Protein Requirements:  g (1.2-1.5 g/kg)  Weight Used For Protein Calculations: 80.9 kg (178 lb 5.6 oz)  Fluid Requirements (mL): 1 mL/kcal  Estimated Fluid Requirement Method: RDA Method  RDA Method (mL): 2427         Nutrition Prescription Ordered    Current Diet Order: regular  Oral Nutrition Supplement: Boost Plus Vanilla TID    Evaluation of Received Nutrient/Fluid Intake    I/O: + 9.5 mL since admit  Energy Calories Required: meeting needs  Protein Required: meeting needs  Fluid Required: meeting needs  Tolerance:tolerating  % Intake of Estimated Energy Needs: 75 - 100 %  % Meal Intake: Other: %    Nutrition Risk    Level of Risk/Frequency of Follow-up: moderate (1-2 x per week)       Monitor and Evaluation    Food and Nutrient Intake: energy intake, food and beverage intake  Food and Nutrient Adminstration: diet order  Knowledge/Beliefs/Attitudes: food and nutrition knowledge/skill, beliefs and attitudes  Physical Activity and Function: nutrition-related ADLs and IADLs, factors affecting access to physical activity  Anthropometric Measurements: height/length, weight, weight change, body mass index  Biochemical Data, Medical Tests and Procedures: electrolyte and renal panel, glucose/endocrine profile, gastrointestinal profile       Nutrition Follow-Up  RD Follow-up?: Yes        Cece More RDN, [place]

## 2024-06-10 NOTE — ASSESSMENT & PLAN NOTE
Likely due to alcohol withdrawal  Will get repeat labs and monitor t bili---normal   If continues will consider further imaging     6/8----improved. No n/v.    6/10  Check BMP

## 2024-06-10 NOTE — PLAN OF CARE
Patient very restless during the night. Up and down to the bathroom multiple times. Refused to wear tele. Complaints of nausea and vomiting. MD updated on behaviors gave ok to give prn haldol. Tonight patient was given prn compazine, haldol and imodium all have been effective. Patient sleeping comfortably at this time.    Problem: Adult Inpatient Plan of Care  Goal: Absence of Hospital-Acquired Illness or Injury  Outcome: Progressing     Problem: Excessive Substance Use  Goal: Optimized Energy Level (Excessive Substance Use)  Outcome: Progressing     Problem: Excessive Substance Use  Goal: Improved Behavioral Control (Excessive Substance Use)  Outcome: Progressing     Problem: Fall Injury Risk  Goal: Absence of Fall and Fall-Related Injury  Outcome: Progressing

## 2024-06-10 NOTE — NURSING
Pt requesting to shower at this time. Pt appears steady on his feet. IV wrapped and telemetry taken off for patient to shower.

## 2024-06-10 NOTE — NURSING
Pt appears asleep in bed. Arousable to voice. Pt states he still continues with nausea and diarrhea. Slight tremor still noted to upper extremities. NADN. Bed alarm on for safety.

## 2024-06-10 NOTE — ASSESSMENT & PLAN NOTE
6/8/24---Overnight Valium increased to q 4 and precedex drip started for CIWA score of 13 and not responding to prn ativan. Transferred to ICU  Precedex held due to hypotension.  Continue Prn ativan(increased to 2mg q 2hr) and scheduled valium(increased to 10mg q 4)  Received banana bag in the ED  Psych consult    6/9  Psych has seen pt----Dr. Interiano's help is very much appreciated.   Currently on Valium 10mg q4.  Ativan 2mg prn CIWA >8 (hasn't used since yesterday).   Risperdal 0.5mg BID   Will downgrade to floor but will not start to taper his BZD until tomorrow since this is the first time since Friday that we have his alcohol withdrawal sx's under control     6/10  Still requiring high doses of benzos /haldol   Last night patient was given prn compazine, haldol and imodium .

## 2024-06-10 NOTE — PROGRESS NOTES
[place] - [facility] ([facility])  [facility] Medicine  Progress Note    Patient Name: Maicol Millan III  MRN: [id number]  Patient Class: IP- Inpatient   Admission Date: 6/6/2024  Length of Stay: 4 days  Attending Physician: Jared Fierro MD  Primary Care Provider: Manisha Hopkins NP        Subjective:     Principal Problem:Alcohol withdrawal        HPI:  Patient is a 53 year old male with medical history of alcohol use disorder (1.75 L of alcohol a day), tobacco use disorder, COPD, anxiety and depression who presented to the ED with unsteady gait.  This has be intermittent for some time and had MRI 3/2024.  He usually gets bilateral tunnel vision and then has unsteadiness of his feet.  Additionally he is going through alcohol withdraw since he stopped trying to drink alcohol 2 days ago.    Admitted for alcohol withdrawal.      Overview/Hospital Course:  PT HD stable on room air.  Still tachycardia but appears more relaxed today. Nausea and vomiting improved with compazine. Repeat labs pending.  T bili elevated yesterday.  Will give one dose of ativan now and continue valium 10mg q6 for another 24 hours. He will likely need a slower taper due to the amount of his alcohol use.      6/8/24  Pt with concerning sx's of ETOH withdrawal last night. CIWA score of 13. Prn ativan was not working. He was given an extra 4mg of ativan IV, Valium increased to q4 scheduled, Ativan increased to 2mg q 2, and transferred to the ICU to start precedex drip overnight\. He did not tolerate the precedex---hypotension. Required rapid response and IV fluid bolus. Precedex has since been held. This am he is in restraints but relatively calm and cooperative since getting his scheduled po valium about an hour ago. Vital signs are normal, stable.      6/9/24  Doing much better this am. Slept well overnight   His CIWA score is 6 this morning   Dr. Interiano has seen patient and made medication recommendations with parameters for prn  "ativan (which he has not had to use since yesterday) and she changed his abilify to Risperdal until delerium under better control.   Will downgrade patient to floor this morning      6/10/23  He is getting valium 10 mg po tid , haldol prn .  BP is stable.  " I am not feeling well"           Review of Systems   Constitutional:  Negative for fever.        " I am sick "   Respiratory:  Negative for shortness of breath.    Cardiovascular:  Negative for chest pain.   Gastrointestinal:  Positive for abdominal pain and nausea. Negative for anal bleeding and blood in stool.   Genitourinary:  Negative for dysuria.   Neurological:  Negative for dizziness and light-headedness.   Psychiatric/Behavioral:  Negative for agitation and hallucinations.      Objective:     Vital Signs (Most Recent):  Temp: 97.1 °F (36.2 °C) (06/10/24 0359)  Pulse: 89 (06/10/24 0359)  Resp: 18 (06/10/24 0359)  BP: 118/74 (06/10/24 0359)  SpO2: 97 % (06/10/24 0700) Vital Signs (24h Range):  Temp:  [97.1 °F (36.2 °C)-97.8 °F (36.6 °C)] 97.1 °F (36.2 °C)  Pulse:  [] 89  Resp:  [18-58] 18  SpO2:  [94 %-100 %] 97 %  BP: (118-167)/(74-97) 118/74     Weight: 81.6 kg (180 lb)  Body mass index is 24.41 kg/m².    Intake/Output Summary (Last 24 hours) at 6/10/2024 0805  Last data filed at 6/9/2024 1816  Gross per 24 hour   Intake 1455.2 ml   Output 200 ml   Net 1255.2 ml         Physical Exam  Constitutional:       Appearance: Normal appearance. He is well-developed. He is not ill-appearing.   HENT:      Head: Normocephalic and atraumatic.      Right Ear: External ear normal.      Left Ear: External ear normal.      Nose: Nose normal.      Mouth/Throat:      Mouth: Mucous membranes are moist.      Pharynx: No oropharyngeal exudate or posterior oropharyngeal erythema.   Eyes:      General: No scleral icterus.        Right eye: No discharge.         Left eye: No discharge.      Conjunctiva/sclera: Conjunctivae normal.      Pupils: Pupils are equal, round, and " "reactive to light.   Neck:      Thyroid: No thyromegaly.      Vascular: No JVD.      Trachea: No tracheal deviation.   Cardiovascular:      Rate and Rhythm: Normal rate and regular rhythm.      Heart sounds: Normal heart sounds. No murmur heard.  Pulmonary:      Effort: Pulmonary effort is normal. No respiratory distress.      Breath sounds: Normal breath sounds. No wheezing or rales.   Chest:      Chest wall: No tenderness.   Abdominal:      General: Bowel sounds are normal. There is no distension.      Palpations: Abdomen is soft. There is no mass.      Tenderness: There is no abdominal tenderness. There is no guarding or rebound.   Musculoskeletal:         General: Normal range of motion.      Cervical back: Normal range of motion and neck supple.      Right lower leg: No edema.      Left lower leg: No edema.   Lymphadenopathy:      Cervical: No cervical adenopathy.   Skin:     General: Skin is warm and dry.   Neurological:      Mental Status: He is alert and oriented to person, place, and time.      Cranial Nerves: No cranial nerve deficit.      Motor: No abnormal muscle tone.      Coordination: Coordination normal.      Deep Tendon Reflexes: Reflexes are normal and symmetric. Reflexes normal.   Psychiatric:         Behavior: Behavior normal.         Thought Content: Thought content normal.         Judgment: Judgment normal.             Significant Labs: All pertinent labs within the past 24 hours have been reviewed.  CBC: No results for input(s): "WBC", "HGB", "HCT", "PLT" in the last 48 hours.  CMP:   Recent Labs   Lab 06/09/24  0504      K 3.4*      CO2 21*   GLU 86   BUN 11   CREATININE 0.8   CALCIUM 9.7   PROT 6.2   ALBUMIN 3.5   BILITOT 0.6   ALKPHOS 65   AST 25   ALT 27   ANIONGAP 11       Significant Imaging: I have reviewed all pertinent imaging results/findings within the past 24 hours.  CT: I have reviewed all pertinent results/findings within the past 24 hours and my personal findings " are:  negative    Assessment/Plan:      * Alcohol withdrawal  6/8/24---Overnight Valium increased to q 4 and precedex drip started for CIWA score of 13 and not responding to prn ativan. Transferred to ICU  Precedex held due to hypotension.  Continue Prn ativan(increased to 2mg q 2hr) and scheduled valium(increased to 10mg q 4)  Received banana bag in the ED  Psych consult    6/9  Psych has seen pt----Dr. Interiano's help is very much appreciated.   Currently on Valium 10mg q4.  Ativan 2mg prn CIWA >8 (hasn't used since yesterday).   Risperdal 0.5mg BID   Will downgrade to floor but will not start to taper his BZD until tomorrow since this is the first time since Friday that we have his alcohol withdrawal sx's under control     6/10  Still requiring high doses of benzos /haldol   Last night patient was given prn compazine, haldol and imodium .      Nausea and vomiting  Likely due to alcohol withdrawal  Will get repeat labs and monitor t bili---normal   If continues will consider further imaging     6/8----improved. No n/v.    6/10  Check BMP       Unsteady gait  Likely related to alcohol use.  Recent MRI brain without acute stroke   CT head wnl   PT/OT today  Vitamin b1 recently wnl .        COPD (chronic obstructive pulmonary disease)  Patient unable to get home inhalers and will discuss with pharmacy alternatives .    Major depressive disorder  Continue home lexapro and put him back on aripiprazole once he is out of withdrawal (using Risperdal currently for delerium related withdrawal symptoms) .      Tobacco abuse  Nicotine patch   Will discuss smoking cessation .        VTE Risk Mitigation (From admission, onward)           Ordered     IP VTE LOW RISK PATIENT  Once         06/06/24 0940     Place sequential compression device  Until discontinued         06/06/24 0940                    Discharge Planning   JORDAN:      Code Status: Full Code   Is the patient medically ready for discharge?:     Reason for patient still in  hospital (select all that apply): Treatment and Pending disposition  Discharge Plan A: Rehab   Discharge Delays: None known at this time              Kaylynn Gottlieb MD  Department of Hospital Medicine   [place] - [facility] S[facility] ([facility])

## 2024-06-10 NOTE — ASSESSMENT & PLAN NOTE
Likely related to alcohol use.  Recent MRI brain without acute stroke   CT head wnl   PT/OT today  Vitamin b1 recently wnl .

## 2024-06-10 NOTE — ASSESSMENT & PLAN NOTE
Continue home lexapro and put him back on aripiprazole once he is out of withdrawal (using Risperdal currently for delerium related withdrawal symptoms) .

## 2024-06-10 NOTE — PLAN OF CARE
06/10/24 1013   Rounds   Attendance Provider;Nurse    Discharge Plan A Psychiatric hospital  (psyc consult recommends inpatient psyc care)   Why the patient remains in the hospital Requires continued medical care   Transition of Care Barriers None     Care team reviewed plan of care and discharge plan with patient.  CM will continue to follow till discharge.

## 2024-06-10 NOTE — PROGRESS NOTES
PSYCHIATRY CONSULT PROGRESS NOTE  SUBSEQUENT HOSPITAL VISIT    DATE OF ADMISSION: 6/6/2024  4:36 AM  LENGTH OF STAY: 4 days    HISTORY  Maicol Millan III is a 53 y.o. male, currently on MSG for Alcohol w/d and DTs    Interim Events  The patient was seen and examined. The chart was reviewed. Staff gave report.     No Ativan prn for w/d symptoms required. VSS.    Received haldol PRN last night.    Continues to experience alcohol w/d symptoms although valium is helping.     Pt reports continued agreement with inpatient psychiatric treatment once medically stable.      Per RN:  Patient very restless during the night. Up and down to the bathroom multiple times. Refused to wear tele. Complaints of nausea and vomiting. MD updated on behaviors gave ok to give prn haldol. Tonight patient was given prn compazine, haldol and imodium all have been effective. Patient sleeping comfortably at this time.           ROS  General ROS: negative  Ophthalmic ROS: negative  ENT ROS: negative  Respiratory ROS: no cough, shortness of breath, or wheezing  Cardiovascular ROS: no chest pain or dyspnea on exertion  Gastrointestinal ROS: +GI distress   Genito-Urinary ROS: no dysuria, trouble voiding, or hematuria  Musculoskeletal ROS: negative  Neurological ROS: dull headache     DIAGNOSTIC TESTING   Laboratory Results  Recent Results (from the past 24 hour(s))   Basic metabolic panel    Collection Time: 06/10/24  9:52 AM   Result Value Ref Range    Sodium 140 136 - 145 mmol/L    Potassium 4.0 3.5 - 5.1 mmol/L    Chloride 102 95 - 110 mmol/L    CO2 27 23 - 29 mmol/L    Glucose 149 (H) 70 - 110 mg/dL    BUN 11 6 - 20 mg/dL    Creatinine 0.9 0.5 - 1.4 mg/dL    Calcium 10.1 8.7 - 10.5 mg/dL    Anion Gap 11 8 - 16 mmol/L    eGFR >60 >60 mL/min/1.73 m^2       VITALS   Vitals:    06/10/24 1615   BP: 117/66   Pulse: (!) 116   Resp: 18   Temp: 97.4 °F (36.3 °C)        Gait and Station: in bed  Involuntary Movements: reduced tremor--mild fine tremor  of bilateral hands  Muscle Tone: no gross abnml    MSE:   Cognition: Alert and oriented to person, [place], situation  Memory: intact to conversation  Attention: intact to conversation   Appearance: stated age, hospital gown, well groomed  Attitude: calm cooperative  Behavior  psychomotor agitation/tremor of bilateral hands good eye contact  Speech normal rate, tone, volume, conversational.    Language fluent English.   Affect euthymic, constricted, reactive  Thought process: linear and logical  Thought content: Passive suicidal denies HI  Denies auditory hallucinations, paranoid ideation, ideas of reference.    Insight good  Judgment good    Assessment and Plan:    Alcohol Withdrawal Delirium-severe (Delirium Tremens)   -Vitamin supplements  -CIWA and q2 vitals   -Seizure precautions  -Valium 10mg q4---continue for now  -Ativan 2mg IV prn SBP>160, DBP>100, HR>100, CIWA>8     -Risperdal 0.5mg PO BID   -Haldol 10mg PO q6 prn agitation or Haldol 10mg IV q8 prn agitation   -Alert provider when Haldol is given so that treatment progress is being closely monitored    Insomnia   - start hydroxyzine 50 mg PO qhs PRN for insomnia      SI  - recommend PEC and admit to BHU once medically stable  - provide psychotherapeutic interventions and medication management     MDD  - continue lexapro 20 mg pO qd  - Switch to Risperdal for delirium but will likely benefit from switch back to Abilify when medically stable  - pt counseled  - follow up with outpatient mental health providers after discharge for medication management and psychotherapy     Anxiety  - lexapro as above     PTSD   - lexapro as above    Chart and Labs reviewed. Discussed progress and ongoing symptoms with clinical team.    Disp: Once medically stable from Alcohol withdrawal and delirium transfer to in level of psychiatric care      STAFF:   Adalberto Oneal III, MD  Psychiatry

## 2024-06-10 NOTE — PLAN OF CARE
Recommendations  1. Continue regular diet.   2. Boost Plus TID.  3. Obtain new weight.  4. RD to follow.    Goals: Patient will meet at least 75% ENN prior to RD follow up.  Nutrition Goal Status: goal met

## 2024-06-10 NOTE — SUBJECTIVE & OBJECTIVE
"    Review of Systems   Constitutional:  Negative for fever.        " I am sick "   Respiratory:  Negative for shortness of breath.    Cardiovascular:  Negative for chest pain.   Gastrointestinal:  Positive for abdominal pain and nausea. Negative for anal bleeding and blood in stool.   Genitourinary:  Negative for dysuria.   Neurological:  Negative for dizziness and light-headedness.   Psychiatric/Behavioral:  Negative for agitation and hallucinations.      Objective:     Vital Signs (Most Recent):  Temp: 97.1 °F (36.2 °C) (06/10/24 0359)  Pulse: 89 (06/10/24 0359)  Resp: 18 (06/10/24 0359)  BP: 118/74 (06/10/24 0359)  SpO2: 97 % (06/10/24 0700) Vital Signs (24h Range):  Temp:  [97.1 °F (36.2 °C)-97.8 °F (36.6 °C)] 97.1 °F (36.2 °C)  Pulse:  [] 89  Resp:  [18-58] 18  SpO2:  [94 %-100 %] 97 %  BP: (118-167)/(74-97) 118/74     Weight: 81.6 kg (180 lb)  Body mass index is 24.41 kg/m².    Intake/Output Summary (Last 24 hours) at 6/10/2024 0805  Last data filed at 6/9/2024 1816  Gross per 24 hour   Intake 1455.2 ml   Output 200 ml   Net 1255.2 ml         Physical Exam  Constitutional:       Appearance: Normal appearance. He is well-developed. He is not ill-appearing.   HENT:      Head: Normocephalic and atraumatic.      Right Ear: External ear normal.      Left Ear: External ear normal.      Nose: Nose normal.      Mouth/Throat:      Mouth: Mucous membranes are moist.      Pharynx: No oropharyngeal exudate or posterior oropharyngeal erythema.   Eyes:      General: No scleral icterus.        Right eye: No discharge.         Left eye: No discharge.      Conjunctiva/sclera: Conjunctivae normal.      Pupils: Pupils are equal, round, and reactive to light.   Neck:      Thyroid: No thyromegaly.      Vascular: No JVD.      Trachea: No tracheal deviation.   Cardiovascular:      Rate and Rhythm: Normal rate and regular rhythm.      Heart sounds: Normal heart sounds. No murmur heard.  Pulmonary:      Effort: Pulmonary effort " "is normal. No respiratory distress.      Breath sounds: Normal breath sounds. No wheezing or rales.   Chest:      Chest wall: No tenderness.   Abdominal:      General: Bowel sounds are normal. There is no distension.      Palpations: Abdomen is soft. There is no mass.      Tenderness: There is no abdominal tenderness. There is no guarding or rebound.   Musculoskeletal:         General: Normal range of motion.      Cervical back: Normal range of motion and neck supple.      Right lower leg: No edema.      Left lower leg: No edema.   Lymphadenopathy:      Cervical: No cervical adenopathy.   Skin:     General: Skin is warm and dry.   Neurological:      Mental Status: He is alert and oriented to person, place, and time.      Cranial Nerves: No cranial nerve deficit.      Motor: No abnormal muscle tone.      Coordination: Coordination normal.      Deep Tendon Reflexes: Reflexes are normal and symmetric. Reflexes normal.   Psychiatric:         Behavior: Behavior normal.         Thought Content: Thought content normal.         Judgment: Judgment normal.             Significant Labs: All pertinent labs within the past 24 hours have been reviewed.  CBC: No results for input(s): "WBC", "HGB", "HCT", "PLT" in the last 48 hours.  CMP:   Recent Labs   Lab 06/09/24  0504      K 3.4*      CO2 21*   GLU 86   BUN 11   CREATININE 0.8   CALCIUM 9.7   PROT 6.2   ALBUMIN 3.5   BILITOT 0.6   ALKPHOS 65   AST 25   ALT 27   ANIONGAP 11       Significant Imaging: I have reviewed all pertinent imaging results/findings within the past 24 hours.  CT: I have reviewed all pertinent results/findings within the past 24 hours and my personal findings are:  negative  "

## 2024-06-11 ENCOUNTER — HOSPITAL ENCOUNTER (INPATIENT)
Facility: HOSPITAL | Age: 54
LOS: 9 days | Discharge: HOME | DRG: 885 | End: 2024-06-20
Attending: STUDENT IN AN ORGANIZED HEALTH CARE EDUCATION/TRAINING PROGRAM | Admitting: STUDENT IN AN ORGANIZED HEALTH CARE EDUCATION/TRAINING PROGRAM
Payer: MEDICAID

## 2024-06-11 VITALS
TEMPERATURE: 98 F | WEIGHT: 180 LBS | BODY MASS INDEX: 24.38 KG/M2 | SYSTOLIC BLOOD PRESSURE: 115 MMHG | RESPIRATION RATE: 18 BRPM | DIASTOLIC BLOOD PRESSURE: 76 MMHG | HEIGHT: 72 IN | HEART RATE: 106 BPM | OXYGEN SATURATION: 99 %

## 2024-06-11 DIAGNOSIS — F10.931 ALCOHOL WITHDRAWAL SYNDROME, WITH DELIRIUM: Primary | ICD-10-CM

## 2024-06-11 DIAGNOSIS — R45.851 SUICIDAL IDEATIONS: ICD-10-CM

## 2024-06-11 LAB — VIT B1 BLD-MCNC: 92 UG/L (ref 38–122)

## 2024-06-11 PROCEDURE — 27000221 HC OXYGEN, UP TO 24 HOURS

## 2024-06-11 PROCEDURE — 25000003 PHARM REV CODE 250: Performed by: STUDENT IN AN ORGANIZED HEALTH CARE EDUCATION/TRAINING PROGRAM

## 2024-06-11 PROCEDURE — 25000003 PHARM REV CODE 250: Performed by: FAMILY MEDICINE

## 2024-06-11 PROCEDURE — 25000003 PHARM REV CODE 250: Performed by: PHYSICIAN ASSISTANT

## 2024-06-11 PROCEDURE — S4991 NICOTINE PATCH NONLEGEND: HCPCS | Performed by: FAMILY MEDICINE

## 2024-06-11 PROCEDURE — 99223 1ST HOSP IP/OBS HIGH 75: CPT | Mod: AF,HB,, | Performed by: STUDENT IN AN ORGANIZED HEALTH CARE EDUCATION/TRAINING PROGRAM

## 2024-06-11 PROCEDURE — 11400000 HC PSYCH PRIVATE ROOM

## 2024-06-11 PROCEDURE — 25000003 PHARM REV CODE 250: Performed by: PSYCHIATRY & NEUROLOGY

## 2024-06-11 PROCEDURE — 99238 HOSP IP/OBS DSCHRG MGMT 30/<: CPT | Mod: ,,, | Performed by: PHYSICIAN ASSISTANT

## 2024-06-11 PROCEDURE — 94760 N-INVAS EAR/PLS OXIMETRY 1: CPT

## 2024-06-11 RX ORDER — ACETAMINOPHEN 325 MG/1
650 TABLET ORAL EVERY 6 HOURS PRN
Status: DISCONTINUED | OUTPATIENT
Start: 2024-06-11 | End: 2024-06-20 | Stop reason: HOSPADM

## 2024-06-11 RX ORDER — FOLIC ACID 1 MG/1
1 TABLET ORAL DAILY
Status: DISCONTINUED | OUTPATIENT
Start: 2024-06-12 | End: 2024-06-20 | Stop reason: HOSPADM

## 2024-06-11 RX ORDER — IBUPROFEN 200 MG
1 TABLET ORAL DAILY
Status: DISCONTINUED | OUTPATIENT
Start: 2024-06-12 | End: 2024-06-20 | Stop reason: HOSPADM

## 2024-06-11 RX ORDER — ESCITALOPRAM OXALATE 20 MG/1
20 TABLET ORAL DAILY
Status: DISCONTINUED | OUTPATIENT
Start: 2024-06-12 | End: 2024-06-20 | Stop reason: HOSPADM

## 2024-06-11 RX ORDER — IBUPROFEN 200 MG
1 TABLET ORAL DAILY
Status: ON HOLD
Start: 2024-06-11 | End: 2024-06-20 | Stop reason: HOSPADM

## 2024-06-11 RX ORDER — TALC
6 POWDER (GRAM) TOPICAL NIGHTLY PRN
Status: DISCONTINUED | OUTPATIENT
Start: 2024-06-11 | End: 2024-06-20 | Stop reason: HOSPADM

## 2024-06-11 RX ORDER — PANTOPRAZOLE SODIUM 40 MG/1
40 TABLET, DELAYED RELEASE ORAL DAILY
Status: ON HOLD
Start: 2024-06-11 | End: 2024-06-20 | Stop reason: HOSPADM

## 2024-06-11 RX ORDER — HYDROXYZINE PAMOATE 50 MG/1
50 CAPSULE ORAL EVERY 6 HOURS PRN
Status: DISCONTINUED | OUTPATIENT
Start: 2024-06-11 | End: 2024-06-20 | Stop reason: HOSPADM

## 2024-06-11 RX ORDER — ONDANSETRON 4 MG/1
4 TABLET, ORALLY DISINTEGRATING ORAL EVERY 8 HOURS PRN
Status: DISCONTINUED | OUTPATIENT
Start: 2024-06-11 | End: 2024-06-20 | Stop reason: HOSPADM

## 2024-06-11 RX ORDER — IBUPROFEN 200 MG
1 TABLET ORAL DAILY PRN
Status: DISCONTINUED | OUTPATIENT
Start: 2024-06-11 | End: 2024-06-11

## 2024-06-11 RX ORDER — LOPERAMIDE HYDROCHLORIDE 2 MG/1
2 CAPSULE ORAL
Status: DISCONTINUED | OUTPATIENT
Start: 2024-06-11 | End: 2024-06-17

## 2024-06-11 RX ORDER — DIAZEPAM 10 MG/1
20 TABLET ORAL 3 TIMES DAILY
Status: DISCONTINUED | OUTPATIENT
Start: 2024-06-11 | End: 2024-06-13

## 2024-06-11 RX ORDER — LANOLIN ALCOHOL/MO/W.PET/CERES
100 CREAM (GRAM) TOPICAL DAILY
Status: ON HOLD
Start: 2024-06-11 | End: 2024-06-20 | Stop reason: HOSPADM

## 2024-06-11 RX ORDER — HYDROXYZINE PAMOATE 25 MG/1
50 CAPSULE ORAL EVERY 6 HOURS PRN
Status: DISCONTINUED | OUTPATIENT
Start: 2024-06-11 | End: 2024-06-11 | Stop reason: HOSPADM

## 2024-06-11 RX ORDER — HYDROXYZINE PAMOATE 50 MG/1
50 CAPSULE ORAL NIGHTLY PRN
Status: ON HOLD
Start: 2024-06-11 | End: 2024-06-20 | Stop reason: HOSPADM

## 2024-06-11 RX ORDER — DIAZEPAM 10 MG/1
10 TABLET ORAL ONCE
Status: COMPLETED | OUTPATIENT
Start: 2024-06-11 | End: 2024-06-11

## 2024-06-11 RX ORDER — LISINOPRIL 10 MG/1
10 TABLET ORAL DAILY
Status: ON HOLD
Start: 2024-06-11 | End: 2024-06-20 | Stop reason: HOSPADM

## 2024-06-11 RX ORDER — PANTOPRAZOLE SODIUM 40 MG/1
40 TABLET, DELAYED RELEASE ORAL DAILY
Status: DISCONTINUED | OUTPATIENT
Start: 2024-06-12 | End: 2024-06-12 | Stop reason: SDUPTHER

## 2024-06-11 RX ORDER — HALOPERIDOL 10 MG/1
10 TABLET ORAL EVERY 6 HOURS PRN
Status: ON HOLD
Start: 2024-06-11 | End: 2024-06-20 | Stop reason: HOSPADM

## 2024-06-11 RX ORDER — THIAMINE HCL 100 MG
100 TABLET ORAL DAILY
Status: DISCONTINUED | OUTPATIENT
Start: 2024-06-12 | End: 2024-06-20 | Stop reason: HOSPADM

## 2024-06-11 RX ORDER — DIAZEPAM 10 MG/1
10 TABLET ORAL EVERY 4 HOURS
Status: ON HOLD
Start: 2024-06-11 | End: 2024-06-20 | Stop reason: HOSPADM

## 2024-06-11 RX ORDER — LORAZEPAM 1 MG/1
1 TABLET ORAL EVERY 6 HOURS PRN
Status: DISCONTINUED | OUTPATIENT
Start: 2024-06-11 | End: 2024-06-20 | Stop reason: HOSPADM

## 2024-06-11 RX ADMIN — DIAZEPAM 10 MG: 10 TABLET ORAL at 11:06

## 2024-06-11 RX ADMIN — RISPERIDONE 0.5 MG: 0.5 TABLET, FILM COATED ORAL at 08:06

## 2024-06-11 RX ADMIN — DIAZEPAM 20 MG: 10 TABLET ORAL at 08:06

## 2024-06-11 RX ADMIN — DIAZEPAM 10 MG: 10 TABLET ORAL at 04:06

## 2024-06-11 RX ADMIN — HYDROXYZINE PAMOATE 50 MG: 25 CAPSULE ORAL at 02:06

## 2024-06-11 RX ADMIN — ESCITALOPRAM 20 MG: 20 TABLET, FILM COATED ORAL at 08:06

## 2024-06-11 RX ADMIN — THERA TABS 1 TABLET: TAB at 08:06

## 2024-06-11 RX ADMIN — MUPIROCIN: 20 OINTMENT TOPICAL at 10:06

## 2024-06-11 RX ADMIN — PANTOPRAZOLE SODIUM 40 MG: 40 TABLET, DELAYED RELEASE ORAL at 08:06

## 2024-06-11 RX ADMIN — DIAZEPAM 10 MG: 10 TABLET ORAL at 08:06

## 2024-06-11 RX ADMIN — FOLIC ACID 1 MG: 1 TABLET ORAL at 08:06

## 2024-06-11 RX ADMIN — DIAZEPAM 10 MG: 10 TABLET ORAL at 03:06

## 2024-06-11 RX ADMIN — HYDROXYZINE PAMOATE 50 MG: 50 CAPSULE ORAL at 08:06

## 2024-06-11 RX ADMIN — Medication 100 MG: at 08:06

## 2024-06-11 RX ADMIN — Medication 6 MG: at 08:06

## 2024-06-11 RX ADMIN — NICOTINE 1 PATCH: 21 PATCH, EXTENDED RELEASE TRANSDERMAL at 08:06

## 2024-06-11 NOTE — ASSESSMENT & PLAN NOTE
6/8/24---Overnight Valium increased to q 4 and precedex drip started for CIWA score of 13 and not responding to prn ativan. Transferred to ICU  Precedex held due to hypotension.  Continue Prn ativan(increased to 2mg q 2hr) and scheduled valium(increased to 10mg q 4)  Received banana bag in the ED  Psych consult    6/9  Psych has seen pt----Dr. Interiano's help is very much appreciated.   Currently on Valium 10mg q4.  Ativan 2mg prn CIWA >8 (hasn't used since yesterday).   Risperdal 0.5mg BID   Will downgrade to floor but will not start to taper his BZD until tomorrow since this is the first time since Friday that we have his alcohol withdrawal sx's under control     6/10  Still requiring high doses of benzos /haldol   Last night patient was given prn compazine, haldol and imodium .    6/11 Continue valium 10mg q 4 and wean as tolerated in BHU.  Psych guiding care.

## 2024-06-11 NOTE — SUBJECTIVE & OBJECTIVE
"    Review of Systems   Constitutional:  Negative for fever.        " I am sick "   Respiratory:  Negative for shortness of breath.    Cardiovascular:  Negative for chest pain.   Gastrointestinal:  Negative for abdominal pain, anal bleeding, blood in stool and nausea.   Genitourinary:  Negative for dysuria.   Neurological:  Negative for dizziness and light-headedness.   Psychiatric/Behavioral:  Negative for agitation and hallucinations.      Objective:     Vital Signs (Most Recent):  Temp: 97.7 °F (36.5 °C) (06/11/24 0722)  Pulse: 90 (06/11/24 0722)  Resp: 16 (06/11/24 0722)  BP: (!) 138/90 (06/11/24 0722)  SpO2: 98 % (06/11/24 0722) Vital Signs (24h Range):  Temp:  [97.1 °F (36.2 °C)-98.2 °F (36.8 °C)] 97.7 °F (36.5 °C)  Pulse:  [] 90  Resp:  [16-20] 16  SpO2:  [94 %-100 %] 98 %  BP: (115-138)/(66-90) 138/90     Weight: 81.6 kg (180 lb)  Body mass index is 24.41 kg/m².  No intake or output data in the 24 hours ending 06/11/24 0748        Physical Exam  Constitutional:       Appearance: Normal appearance. He is well-developed. He is not ill-appearing.   HENT:      Head: Normocephalic and atraumatic.      Right Ear: External ear normal.      Left Ear: External ear normal.      Nose: Nose normal.      Mouth/Throat:      Mouth: Mucous membranes are moist.      Pharynx: No oropharyngeal exudate or posterior oropharyngeal erythema.   Eyes:      General: No scleral icterus.        Right eye: No discharge.         Left eye: No discharge.      Conjunctiva/sclera: Conjunctivae normal.      Pupils: Pupils are equal, round, and reactive to light.   Neck:      Thyroid: No thyromegaly.      Vascular: No JVD.      Trachea: No tracheal deviation.   Cardiovascular:      Rate and Rhythm: Normal rate and regular rhythm.      Heart sounds: Normal heart sounds. No murmur heard.  Pulmonary:      Effort: Pulmonary effort is normal. No respiratory distress.      Breath sounds: Normal breath sounds. No wheezing or rales.   Chest:     " " Chest wall: No tenderness.   Abdominal:      General: Bowel sounds are normal. There is no distension.      Palpations: Abdomen is soft. There is no mass.      Tenderness: There is no abdominal tenderness. There is no guarding or rebound.   Musculoskeletal:         General: Normal range of motion.      Cervical back: Normal range of motion and neck supple.      Right lower leg: No edema.      Left lower leg: No edema.   Lymphadenopathy:      Cervical: No cervical adenopathy.   Skin:     General: Skin is warm and dry.   Neurological:      Mental Status: He is alert and oriented to person, place, and time.      Cranial Nerves: No cranial nerve deficit.      Motor: No abnormal muscle tone.      Coordination: Coordination normal.      Deep Tendon Reflexes: Reflexes are normal and symmetric. Reflexes normal.   Psychiatric:         Behavior: Behavior normal.         Thought Content: Thought content normal.         Judgment: Judgment normal.             Significant Labs: All pertinent labs within the past 24 hours have been reviewed.  CBC: No results for input(s): "WBC", "HGB", "HCT", "PLT" in the last 48 hours.  CMP:   Recent Labs   Lab 06/10/24  0952      K 4.0      CO2 27   *   BUN 11   CREATININE 0.9   CALCIUM 10.1   ANIONGAP 11       Significant Imaging: I have reviewed all pertinent imaging results/findings within the past 24 hours.  CT: I have reviewed all pertinent results/findings within the past 24 hours and my personal findings are:  negative  "

## 2024-06-11 NOTE — PSYCH
Admit Note:  Pt received from 3rd floor med/surg unit after acute alcohol detox.  Pt presents to [facility] on [facility]VA.  Escorted to [facility] by security and tech via wheelchair.  Pt wanded and ambulatory on unit.  Pt requesting help to continue through alcohol detox.  Pt singed in FVA by Dr Oneal.  Pt calm and cooperative with admission assessment.  He denies any S/I or H/I or psychosis at this time.  States he is just here to finish his detox.  Pt unsure at this time if he wants to go to rehab from here.  States he has a disabled wife at home and doesn't want to leave her.  Pt denying any other psychiatric issues other than alcohol addiction.  Pt does appears slightly tremulous and anxious.  Pt CIWA score 7 on admit.  Pt given scheduled valium.  Explained unit rules and procedures to pt and answered all questions.  Pt signed all paperwork and given handouts and explained visitation/phone protocols with pt, understanding verbalized.  Pt declined wanting visitors at this time.  Pt oriented to unit and instructed to inform staff of any needs or concerns, understanding verbalized.  No acute distress apparent at this time, will continue to monitor.

## 2024-06-11 NOTE — ASSESSMENT & PLAN NOTE
Continue home lexapro and put him back on aripiprazole once he is out of withdrawal (using Risperdal currently for delerium related withdrawal symptoms) .    Defer to psych

## 2024-06-11 NOTE — PROGRESS NOTES
[place] [facility] ([facility])  [facility] Medicine  Progress Note    Patient Name: Maicol Millan III  MRN: [id number]  Patient Class: IP- Inpatient   Admission Date: 6/6/2024  Length of Stay: 5 days  Attending Physician: Jared Fierro MD  Primary Care Provider: Manisha Hopkins NP        Subjective:     Principal Problem:Alcohol withdrawal        HPI:  Patient is a 53 year old male with medical history of alcohol use disorder (1.75 L of alcohol a day), tobacco use disorder, COPD, anxiety and depression who presented to the ED with unsteady gait.  This has be intermittent for some time and had MRI 3/2024.  He usually gets bilateral tunnel vision and then has unsteadiness of his feet.  Additionally he is going through alcohol withdraw since he stopped trying to drink alcohol 2 days ago.    Admitted for alcohol withdrawal.      Overview/Hospital Course:  PT HD stable on room air.  Still tachycardia but appears more relaxed today. Nausea and vomiting improved with compazine. Repeat labs pending.  T bili elevated yesterday.  Will give one dose of ativan now and continue valium 10mg q6 for another 24 hours. He will likely need a slower taper due to the amount of his alcohol use.      6/8/24  Pt with concerning sx's of ETOH withdrawal last night. CIWA score of 13. Prn ativan was not working. He was given an extra 4mg of ativan IV, Valium increased to q4 scheduled, Ativan increased to 2mg q 2, and transferred to the ICU to start precedex drip overnight\. He did not tolerate the precedex---hypotension. Required rapid response and IV fluid bolus. Precedex has since been held. This am he is in restraints but relatively calm and cooperative since getting his scheduled po valium about an hour ago. Vital signs are normal, stable.      6/9/24  Doing much better this am. Slept well overnight   His CIWA score is 6 this morning   Dr. Interiano has seen patient and made medication recommendations with parameters for prn  "ativan (which he has not had to use since yesterday) and she changed his abilify to Risperdal until delerium under better control.   Will downgrade patient to floor this morning      6/10/23  He is getting valium 10 mg po tid , haldol prn .  BP is stable.  " I am not feeling well"     6/11 PT HD stable on room air.  Calm today and ready to go to [facility].          Review of Systems   Constitutional:  Negative for fever.        " I am sick "   Respiratory:  Negative for shortness of breath.    Cardiovascular:  Negative for chest pain.   Gastrointestinal:  Negative for abdominal pain, anal bleeding, blood in stool and nausea.   Genitourinary:  Negative for dysuria.   Neurological:  Negative for dizziness and light-headedness.   Psychiatric/Behavioral:  Negative for agitation and hallucinations.      Objective:     Vital Signs (Most Recent):  Temp: 97.7 °F (36.5 °C) (06/11/24 0722)  Pulse: 90 (06/11/24 0722)  Resp: 16 (06/11/24 0722)  BP: (!) 138/90 (06/11/24 0722)  SpO2: 98 % (06/11/24 0722) Vital Signs (24h Range):  Temp:  [97.1 °F (36.2 °C)-98.2 °F (36.8 °C)] 97.7 °F (36.5 °C)  Pulse:  [] 90  Resp:  [16-20] 16  SpO2:  [94 %-100 %] 98 %  BP: (115-138)/(66-90) 138/90     Weight: 81.6 kg (180 lb)  Body mass index is 24.41 kg/m².  No intake or output data in the 24 hours ending 06/11/24 0748        Physical Exam  Constitutional:       Appearance: Normal appearance. He is well-developed. He is not ill-appearing.   HENT:      Head: Normocephalic and atraumatic.      Right Ear: External ear normal.      Left Ear: External ear normal.      Nose: Nose normal.      Mouth/Throat:      Mouth: Mucous membranes are moist.      Pharynx: No oropharyngeal exudate or posterior oropharyngeal erythema.   Eyes:      General: No scleral icterus.        Right eye: No discharge.         Left eye: No discharge.      Conjunctiva/sclera: Conjunctivae normal.      Pupils: Pupils are equal, round, and reactive to light.   Neck:      Thyroid: " "No thyromegaly.      Vascular: No JVD.      Trachea: No tracheal deviation.   Cardiovascular:      Rate and Rhythm: Normal rate and regular rhythm.      Heart sounds: Normal heart sounds. No murmur heard.  Pulmonary:      Effort: Pulmonary effort is normal. No respiratory distress.      Breath sounds: Normal breath sounds. No wheezing or rales.   Chest:      Chest wall: No tenderness.   Abdominal:      General: Bowel sounds are normal. There is no distension.      Palpations: Abdomen is soft. There is no mass.      Tenderness: There is no abdominal tenderness. There is no guarding or rebound.   Musculoskeletal:         General: Normal range of motion.      Cervical back: Normal range of motion and neck supple.      Right lower leg: No edema.      Left lower leg: No edema.   Lymphadenopathy:      Cervical: No cervical adenopathy.   Skin:     General: Skin is warm and dry.   Neurological:      Mental Status: He is alert and oriented to person, place, and time.      Cranial Nerves: No cranial nerve deficit.      Motor: No abnormal muscle tone.      Coordination: Coordination normal.      Deep Tendon Reflexes: Reflexes are normal and symmetric. Reflexes normal.   Psychiatric:         Behavior: Behavior normal.         Thought Content: Thought content normal.         Judgment: Judgment normal.             Significant Labs: All pertinent labs within the past 24 hours have been reviewed.  CBC: No results for input(s): "WBC", "HGB", "HCT", "PLT" in the last 48 hours.  CMP:   Recent Labs   Lab 06/10/24  0952      K 4.0      CO2 27   *   BUN 11   CREATININE 0.9   CALCIUM 10.1   ANIONGAP 11       Significant Imaging: I have reviewed all pertinent imaging results/findings within the past 24 hours.  CT: I have reviewed all pertinent results/findings within the past 24 hours and my personal findings are:  negative    Assessment/Plan:      * Alcohol withdrawal  6/8/24---Overnight Valium increased to q 4 and " precedex drip started for CIWA score of 13 and not responding to prn ativan. Transferred to ICU  Precedex held due to hypotension.  Continue Prn ativan(increased to 2mg q 2hr) and scheduled valium(increased to 10mg q 4)  Received banana bag in the ED  Psych consult    6/9  Psych has seen pt----Dr. Interiano's help is very much appreciated.   Currently on Valium 10mg q4.  Ativan 2mg prn CIWA >8 (hasn't used since yesterday).   Risperdal 0.5mg BID   Will downgrade to floor but will not start to taper his BZD until tomorrow since this is the first time since Friday that we have his alcohol withdrawal sx's under control     6/10  Still requiring high doses of benzos /haldol   Last night patient was given prn compazine, haldol and imodium .    6/11 Continue valium 10mg q 4 and wean as tolerated in BHU.  Psych guiding care.          Nausea and vomiting  Likely due to alcohol withdrawal  Will get repeat labs and monitor t bili---normal   If continues will consider further imaging     6/8----improved. No n/v.    6/10  Check BMP       Unsteady gait  Likely related to alcohol use.  Recent MRI brain without acute stroke   CT head wnl   PT/OT today  Vitamin b1 recently wnl .    Resolved         COPD (chronic obstructive pulmonary disease)  Patient unable to get home inhalers and will discuss with pharmacy alternatives .  Inhalers likely contributed to tachycardic events since he is not using them at home.    No wheezing or tightness on lung auscultation     Major depressive disorder  Continue home lexapro and put him back on aripiprazole once he is out of withdrawal (using Risperdal currently for delerium related withdrawal symptoms) .    Defer to psych       Tobacco abuse  Nicotine patch   Will discuss smoking cessation .        VTE Risk Mitigation (From admission, onward)           Ordered     IP VTE LOW RISK PATIENT  Once         06/06/24 0940     Place sequential compression device  Until discontinued         06/06/24 0940                     Discharge Planning   JORDAN:      Code Status: Full Code   Is the patient medically ready for discharge?:     Reason for patient still in hospital (select all that apply): Other (specify) discharge to [facility] today  Discharge Plan A: Psychiatric hospital (psyc consult recommends inpatient psyc care)   Discharge Delays: None known at this time              Gloria Anguiano PA-C  Department of Hospital Medicine   [place] [facility] ([facility])

## 2024-06-11 NOTE — CONSULTS
Case management consulted for advanced directive assistance.    Patient is and will remain full code at this time.

## 2024-06-11 NOTE — PLAN OF CARE
Patient calm and cooperative tonight. No behaviors issues. Did receive prn hydroxyzine at HS which was effective.     Problem: Adult Inpatient Plan of Care  Goal: Absence of Hospital-Acquired Illness or Injury  Outcome: Progressing     Problem: Adult Inpatient Plan of Care  Goal: Optimal Comfort and Wellbeing  Outcome: Progressing     Problem: Excessive Substance Use  Goal: Optimized Energy Level (Excessive Substance Use)  Outcome: Progressing     Problem: Fall Injury Risk  Goal: Absence of Fall and Fall-Related Injury  Outcome: Progressing

## 2024-06-11 NOTE — ASSESSMENT & PLAN NOTE
Likely due to alcohol withdrawal  Will get repeat labs and monitor t bili---normal   If continues will consider further imaging     6/8----improved. No n/v.    6/10  Check BMP     resolved   Detail Level: Detailed Depth Of Biopsy: dermis Was A Bandage Applied: Yes Size Of Lesion In Cm: 0 Biopsy Type: H and E Biopsy Method: 15 blade Anesthesia Type: 1% lidocaine without epinephrine Anesthesia Volume In Cc (Will Not Render If 0): 0.3 Hemostasis: Electrocautery Wound Care: Vaseline Dressing: bandage Destruction After The Procedure: No Type Of Destruction Used: Curettage Curettage Text: The wound bed was treated with curettage after the biopsy was performed. Cryotherapy Text: The wound bed was treated with cryotherapy after the biopsy was performed. Electrodesiccation Text: The wound bed was treated with electrodesiccation after the biopsy was performed. Electrodesiccation And Curettage Text: The wound bed was treated with electrodesiccation and curettage after the biopsy was performed. Silver Nitrate Text: The wound bed was treated with silver nitrate after the biopsy was performed. Lab: [facility] Lab Facility: [address] Consent: The provider's intent is to obtain a tissue sample solely for diagnostic purposes. Written consent to obtain tissue sample was obtained and risks were reviewed including but not limited to scarring, infection, bleeding, scabbing, incomplete removal, nerve damage and allergy to anesthesia. Post-Care Instructions: I reviewed with the patient in detail post-care instructions. Patient is to keep the biopsy site dry overnight, and then apply bacitracin twice daily until healed. Patient may apply hydrogen peroxide soaks to remove any crusting. Notification Instructions: Patient will be notified of biopsy results. However, patient instructed to call the office if not contacted within 2 weeks. Billing Type: [employer] Information: Selecting Yes will display possible errors in your note based on the variables you have selected. This validation is only offered as a suggestion for you. PLEASE NOTE THAT THE VALIDATION TEXT WILL BE REMOVED WHEN YOU FINALIZE YOUR NOTE. IF YOU WANT TO FAX A PRELIMINARY NOTE YOU WILL NEED TO TOGGLE THIS TO 'NO' IF YOU DO NOT WANT IT IN YOUR FAXED NOTE.

## 2024-06-11 NOTE — PLAN OF CARE
Problem: Adult Inpatient Plan of Care  Goal: Plan of Care Review  Outcome: Met     Problem: Adult Inpatient Plan of Care  Goal: Plan of Care Review  Outcome: Met  Goal: Patient-Specific Goal (Individualized)  Outcome: Met  Goal: Absence of Hospital-Acquired Illness or Injury  Outcome: Met  Goal: Optimal Comfort and Wellbeing  Outcome: Met  Goal: Readiness for Transition of Care  Outcome: Met

## 2024-06-11 NOTE — DISCHARGE SUMMARY
[facility] ([facility])  [facility] Medicine  Discharge Summary      Patient Name: Maicol Millan III  MRN: [id number]  CLAY: [id number]  Patient Class: IP- Inpatient  Admission Date: 6/6/2024  Hospital Length of Stay: 5 days  Discharge Date and Time:  06/11/2024 7:58 AM  Attending Physician: Jared Fierro MD   Discharging Provider: Gloria Anguiano PA-C  Primary Care Provider: Manisha Hopkins NP    Primary Care Team: Networked reference to record PCT     HPI:   Patient is a 53 year old male with medical history of alcohol use disorder (1.75 L of alcohol a day), tobacco use disorder, COPD, anxiety and depression who presented to the ED with unsteady gait.  This has be intermittent for some time and had MRI 3/2024.  He usually gets bilateral tunnel vision and then has unsteadiness of his feet.  Additionally he is going through alcohol withdraw since he stopped trying to drink alcohol 2 days ago.    Admitted for alcohol withdrawal.      * No surgery found *      Hospital Course:   PT HD stable on room air.  Still tachycardia but appears more relaxed today. Nausea and vomiting improved with compazine. Repeat labs pending.  T bili elevated yesterday.  Will give one dose of ativan now and continue valium 10mg q6 for another 24 hours. He will likely need a slower taper due to the amount of his alcohol use.      6/8/24  Pt with concerning sx's of ETOH withdrawal last night. CIWA score of 13. Prn ativan was not working. He was given an extra 4mg of ativan IV, Valium increased to q4 scheduled, Ativan increased to 2mg q 2, and transferred to the ICU to start precedex drip overnight\. He did not tolerate the precedex---hypotension. Required rapid response and IV fluid bolus. Precedex has since been held. This am he is in restraints but relatively calm and cooperative since getting his scheduled po valium about an hour ago. Vital signs are normal, stable.      6/9/24  Doing much better this am. Slept  "well overnight   His CIWA score is 6 this morning   Dr. Interiano has seen patient and made medication recommendations with parameters for prn ativan (which he has not had to use since yesterday) and she changed his abilify to Risperdal until delerium under better control.   Will downgrade patient to floor this morning      6/10/23  He is getting valium 10 mg po tid , haldol prn .  BP is stable.  " I am not feeling well"     6/11 PT HD stable on room air.  Calm today and ready to go to U.         Goals of Care Treatment Preferences:  Code Status: Full Code      Consults:   Consults (From admission, onward)          Status Ordering Provider     Inpatient consult to Psychiatry  Once        Provider:  (Not yet assigned)    Completed SHELTON ENRIQUE     Inpatient consult to Registered Dietitian/Nutritionist  Once        Provider:  (Not yet assigned)    Completed DOMITILA GARCIA     Inpatient consult to Social Work/Case Management  Once        Provider:  (Not yet assigned)    Acknowledged DOMITILA GARCIA            Pulmonary  COPD (chronic obstructive pulmonary disease)  Patient unable to get home inhalers and will discuss with pharmacy alternatives .  Inhalers likely contributed to tachycardic events since he is not using them at home.    No wheezing or tightness on lung auscultation     GI  Nausea and vomiting  Likely due to alcohol withdrawal  Will get repeat labs and monitor t bili---normal   If continues will consider further imaging     6/8----improved. No n/v.    6/10  Check BMP     resolved    Other  * Alcohol withdrawal  6/8/24---Overnight Valium increased to q 4 and precedex drip started for CIWA score of 13 and not responding to prn ativan. Transferred to ICU  Precedex held due to hypotension.  Continue Prn ativan(increased to 2mg q 2hr) and scheduled valium(increased to 10mg q 4)  Received banana bag in the ED  Psych consult    6/9  Psych has seen pt----Dr. Interiano's help is very much appreciated. "   Currently on Valium 10mg q4.  Ativan 2mg prn CIWA >8 (hasn't used since yesterday).   Risperdal 0.5mg BID   Will downgrade to floor but will not start to taper his BZD until tomorrow since this is the first time since Friday that we have his alcohol withdrawal sx's under control     6/10  Still requiring high doses of benzos /haldol   Last night patient was given prn compazine, haldol and imodium .    6/11 Continue valium 10mg q 4 and wean as tolerated in BHU.  Psych guiding care.          Unsteady gait  Likely related to alcohol use.  Recent MRI brain without acute stroke   CT head wnl   PT/OT today  Vitamin b1 recently wnl .    Resolved         Major depressive disorder  Continue home lexapro and put him back on aripiprazole once he is out of withdrawal (using Risperdal currently for delerium related withdrawal symptoms) .    Defer to psych       Tobacco abuse  Nicotine patch   Will discuss smoking cessation .        Final Active Diagnoses:    Diagnosis Date Noted POA    PRINCIPAL PROBLEM:  Alcohol withdrawal [F10.939] 12/17/2023 Yes    Nausea and vomiting [R11.2] 06/07/2024 Yes    Major depressive disorder [F32.9] 06/06/2024 Yes    COPD (chronic obstructive pulmonary disease) [J44.9] 06/06/2024 Yes    Unsteady gait [R26.81] 06/06/2024 Yes    Tobacco abuse [Z72.0] 12/17/2023 Yes      Problems Resolved During this Admission:    Diagnosis Date Noted Date Resolved POA    Major depressive disorder with current active episode [F32.9] 12/17/2023 06/06/2024 Yes       Discharged Condition: good    [facility]position: [facility]    Follow Up:   Follow-up Information       Manisha Hopkins NP Follow up.    Specialty: Family Medicine  Contact information:  [id number]   [place] [address] [phone]                           Patient Instructions:      Ambulatory referral/consult to Smoking Cessation Program   Standing Status: Future   Referral Priority: Routine Referral Type: Consultation   Referral Reason:  Specialty Services Required   Requested Specialty: CTTS   Number of Visits Requested: 1     Reason for not Prescribing Nicotine Replacement   Order Comments: Already prescribed     Order Specific Question Answer Comments   Reason for not Prescribing: Not medically appropriate at this time        Significant Diagnostic Studies: see A&P     Pending Diagnostic Studies:       Procedure Component Value Units Date/Time    Vitamin B1 [[id number]] Collected: 06/06/24 1338    Order Status: Sent Lab Status: In process Updated: 06/06/24 2003    Specimen: Blood     Vitamin B6 [[id number]] Collected: 06/06/24 1253    Order Status: Sent Lab Status: In process Updated: 06/06/24 2003    Specimen: Blood            Medications:  Reconciled Home Medications:      Medication List        START taking these medications      diazePAM 10 MG Tab  Commonly known as: VALIUM  Take 1 tablet (10 mg total) by mouth every 4 (four) hours.     haloperidoL 10 MG tablet  Commonly known as: HALDOL  Take 1 tablet (10 mg total) by mouth every 6 (six) hours as needed.     hydrOXYzine pamoate 50 MG Cap  Commonly known as: VISTARIL  Take 1 capsule (50 mg total) by mouth nightly as needed (insomnia).     lisinopriL 10 MG tablet  Take 1 tablet (10 mg total) by mouth once daily.     nicotine 21 mg/24 hr  Commonly known as: NICODERM CQ  Place 1 patch onto the skin once daily.     pantoprazole 40 MG tablet  Commonly known as: PROTONIX  Take 1 tablet (40 mg total) by mouth once daily.     thiamine 100 MG tablet  Take 1 tablet (100 mg total) by mouth once daily.            CONTINUE taking these medications      albuterol 90 mcg/actuation inhaler  Commonly known as: PROVENTIL/VENTOLIN HFA  Inhale 2 puffs into the lungs every 4 (four) hours as needed for Wheezing or Shortness of Breath.     ARIPiprazole 5 MG Tab  Commonly known as: ABILIFY  Take 1 tablet (5 mg total) by mouth once daily.     cetirizine 10 MG tablet  Commonly known as: ZYRTEC  Take 1 tablet (10 mg  total) by mouth once daily.     fluticasone propionate 110 mcg/actuation inhaler  Commonly known as: FLOVENT HFA  Inhale 2 puffs into the lungs 2 (two) times daily. Controller     tiotropium-olodateroL 2.5-2.5 mcg/actuation Mist  Commonly known as: STIOLTO RESPIMAT  Inhale 2 puffs into the lungs once daily. Controller            STOP taking these medications      EScitalopram oxalate 20 MG tablet  Commonly known as: LEXAPRO     LORazepam 0.5 MG tablet  Commonly known as: ATIVAN     rOPINIRole 1 MG tablet  Commonly known as: REQUIP              Indwelling Lines/Drains at time of discharge:   Lines/Drains/Airways       None                   Time spent on the discharge of patient: 25 minutes         Gloria Anguiano PA-C  Department of Hospital Medicine  [facility] (3rd Fl)

## 2024-06-11 NOTE — NURSING
Hand off report called to Huey Alanis on U. Pt will transfer to [email or web address] with [facility] [[email or web address] tech.

## 2024-06-11 NOTE — PLAN OF CARE
06/11/24 0828   Rounds   Attendance Provider;Nurse    Discharge Plan A [facility]   Why the patient remains in the hospital Requires continued medical care   Transition of Care Barriers Other (see comments)     Care team reviewed plan to discharge today with patient.  Patient  VU of information discussed.
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
## 2024-06-11 NOTE — ASSESSMENT & PLAN NOTE
Likely related to alcohol use.  Recent MRI brain without acute stroke   CT head wnl   PT/OT today  Vitamin b1 recently wnl .    Resolved

## 2024-06-11 NOTE — ASSESSMENT & PLAN NOTE
Patient unable to get home inhalers and will discuss with pharmacy alternatives .  Inhalers likely contributed to tachycardic events since he is not using them at home.    No wheezing or tightness on lung auscultation

## 2024-06-12 LAB — PYRIDOXAL SERPL-MCNC: 8 UG/L (ref 5–50)

## 2024-06-12 PROCEDURE — S4991 NICOTINE PATCH NONLEGEND: HCPCS | Performed by: STUDENT IN AN ORGANIZED HEALTH CARE EDUCATION/TRAINING PROGRAM

## 2024-06-12 PROCEDURE — 11400000 HC PSYCH PRIVATE ROOM

## 2024-06-12 PROCEDURE — 99233 SBSQ HOSP IP/OBS HIGH 50: CPT | Mod: AF,HB,, | Performed by: STUDENT IN AN ORGANIZED HEALTH CARE EDUCATION/TRAINING PROGRAM

## 2024-06-12 PROCEDURE — 25000003 PHARM REV CODE 250: Performed by: STUDENT IN AN ORGANIZED HEALTH CARE EDUCATION/TRAINING PROGRAM

## 2024-06-12 RX ORDER — PANTOPRAZOLE SODIUM 40 MG/1
40 TABLET, DELAYED RELEASE ORAL DAILY
Status: DISCONTINUED | OUTPATIENT
Start: 2024-06-13 | End: 2024-06-20 | Stop reason: HOSPADM

## 2024-06-12 RX ORDER — ARIPIPRAZOLE 5 MG/1
5 TABLET ORAL DAILY
Status: DISCONTINUED | OUTPATIENT
Start: 2024-06-12 | End: 2024-06-13

## 2024-06-12 RX ORDER — ALBUTEROL SULFATE 90 UG/1
2 AEROSOL, METERED RESPIRATORY (INHALATION) EVERY 6 HOURS PRN
Status: DISCONTINUED | OUTPATIENT
Start: 2024-06-12 | End: 2024-06-20 | Stop reason: HOSPADM

## 2024-06-12 RX ORDER — LISINOPRIL 10 MG/1
10 TABLET ORAL DAILY
Status: DISCONTINUED | OUTPATIENT
Start: 2024-06-13 | End: 2024-06-20 | Stop reason: HOSPADM

## 2024-06-12 RX ADMIN — DIAZEPAM 20 MG: 10 TABLET ORAL at 08:06

## 2024-06-12 RX ADMIN — LOPERAMIDE HYDROCHLORIDE 2 MG: 2 CAPSULE ORAL at 08:06

## 2024-06-12 RX ADMIN — LOPERAMIDE HYDROCHLORIDE 2 MG: 2 CAPSULE ORAL at 01:06

## 2024-06-12 RX ADMIN — PANTOPRAZOLE SODIUM 40 MG: 40 TABLET, DELAYED RELEASE ORAL at 08:06

## 2024-06-12 RX ADMIN — NICOTINE 1 PATCH: 21 PATCH, EXTENDED RELEASE TRANSDERMAL at 08:06

## 2024-06-12 RX ADMIN — HYDROXYZINE PAMOATE 50 MG: 50 CAPSULE ORAL at 08:06

## 2024-06-12 RX ADMIN — THERA TABS 1 TABLET: TAB at 08:06

## 2024-06-12 RX ADMIN — FOLIC ACID 1 MG: 1 TABLET ORAL at 08:06

## 2024-06-12 RX ADMIN — ESCITALOPRAM 20 MG: 20 TABLET, FILM COATED ORAL at 08:06

## 2024-06-12 RX ADMIN — Medication 100 MG: at 08:06

## 2024-06-12 RX ADMIN — LOPERAMIDE HYDROCHLORIDE 2 MG: 2 CAPSULE ORAL at 05:06

## 2024-06-12 RX ADMIN — ARIPIPRAZOLE 5 MG: 5 TABLET ORAL at 02:06

## 2024-06-12 RX ADMIN — Medication 6 MG: at 08:06

## 2024-06-12 RX ADMIN — DIAZEPAM 20 MG: 10 TABLET ORAL at 02:06

## 2024-06-12 RX ADMIN — ONDANSETRON 4 MG: 4 TABLET, ORALLY DISINTEGRATING ORAL at 07:06

## 2024-06-12 NOTE — PLAN OF CARE
[facility] - Med Surg (3rd Fl)  Discharge Final Note    Primary Care Provider: Manisha Hopkins NP    Expected Discharge Date: 6/11/2024    Final Discharge Note (most recent)       Final Note - 06/12/24 1527          Final Note    Assessment Type Final Discharge Note (P)      Anticipated Discharge Disposition Rehab Facility (P)      Hospital Resources/Appts/Education Provided Appointments scheduled and added to AVS (P)         Post-Acute Status    Discharge Delays None known at this time (P)                      Important Message from Medicare             Contact Info       Manisha Hopkins NP   Specialty: Family Medicine   Relationship: PCP - General    [place] [facility]  [id number]   [facility] [address]   Phone: [phone]       Next Steps: Follow up          Patient will discharge to [facility]. No post acute needs. Follow up appointments scheduled above.

## 2024-06-12 NOTE — PROGRESS NOTES
PSYCHIATRY DAILY INPATIENT PROGRESS NOTE  SUBSEQUENT HOSPITAL VISIT    ENCOUNTER DATE: 6/12/2024  SITE: [facility]    DATE OF ADMISSION: 6/11/2024  3:58 PM  LENGTH OF STAY: 1 days      CHIEF COMPLAINT   Maicol Millan III is a 53 y.o. male, seen during daily horne rounds on the inpatient unit.  Maicol Millan III presented with the chief complaint of depression, anxiety, and substance problems      The patient was seen and examined. The chart was reviewed.     Reviewed notes from Rns, PA, MD, and CTRS and labs from the last 24 hours.    The patient's case was discussed with the treatment team/care providers today including Rns    Staff reports no behavioral or management issues.     The patient has been compliant with treatment.      Subjective 06/12/2024       Today the patient reports mood has been up and down. W/d improving steadily. No signs of complicated w/d. Reports diarrhea overnight.    He is unsure if he wants to go to rehab.    Explored pt's motivation for sobriety. Desire is present. States he will have to think about it after detox completed.    The patient denies any side effects to medications.    At this time symptoms remains severe, functionally and behaviorally impairing and pt remains in need of continued acute inpatient hospitalization for both safety and stabilization.           Interim/overnight events per report/notes:          Psychiatric ROS (observed, reported, or endorsed/denied):  Depressed mood - Continuing  Interest/pleasure/anhedonia: fluctuating  Guilt/hopelessness/worthlessness - Continuing  Changes in Sleep - less  Changes in Appetite - less  Changes in Concentration - fluctuating  Changes in Energy - fluctuating  PMA/R- fluctuating  Suicidal- active/passive ideations - fluctuating  Homicidal ideations: active/passive ideations - No    Hallucinations - No  Delusions - No  Disorganized behavior - No  Disorganized speech - No  Negative symptoms - No    Elevated mood -  No  Decreased need for sleep - No  Grandiosity - No  Racing thoughts - No  Impulsivity - No  Irritability- No  Increased energy - No  Distractibility - No  Increase in goal-directed activity or PMA- No    Symptoms of CAROLA - fluctuating  Symptoms of Panic Disorder- fluctuating  Symptoms of PTSD - fluctuating        Overall progress: Patient is showing mild improvement         Psychotherapy:  Target symptoms: depression, anxiety , substance abuse  Why chosen therapy is appropriate versus another modality: relevant to diagnosis  Outcome monitoring methods: self-report  Therapeutic intervention type: supportive psychotherapy  Topics discussed/themes: building skills sets for symptom management, symptom recognition  The patient's response to the intervention is accepting. The patient's progress toward treatment goals is fair.   Duration of intervention: 16 minutes.        Medical ROS  Review of Systems   Constitutional:  Negative for chills and fever.   HENT:  Negative for hearing loss.    Eyes:  Negative for blurred vision and double vision.   Respiratory:  Negative for shortness of breath.    Cardiovascular:  Negative for chest pain and palpitations.   Gastrointestinal:  Negative for constipation, diarrhea, nausea and vomiting.   Genitourinary:  Negative for dysuria and urgency.   Musculoskeletal:  Negative for joint pain.   Skin:  Negative for rash.   Neurological:  Negative for dizziness and headaches.   Endo/Heme/Allergies:  Negative for environmental allergies.         PAST MEDICAL HISTORY   Past Medical History:   Diagnosis Date    Addiction to drug     Alcohol abuse     last drink 9/06/22    Anxiety     Bipolar disorder     COPD (chronic obstructive pulmonary disease)     Depression     Disc disorder     Fatigue     Headache     History of psychiatric hospitalization     Hx of psychiatric care     Panic disorder     Psychiatric problem     PTSD (post-traumatic stress disorder)     Sleep difficulties     Suicide  attempt     Therapy     Withdrawal symptoms, alcohol            PSYCHOTROPIC MEDICATIONS   Scheduled Meds:   diazePAM  20 mg Oral TID    EScitalopram oxalate  20 mg Oral Daily    folic acid  1 mg Oral Daily    multivitamin  1 tablet Oral Daily    nicotine  1 patch Transdermal Daily    pantoprazole  40 mg Oral Daily    thiamine  100 mg Oral Daily     Continuous Infusions:  PRN Meds:.  Current Facility-Administered Medications:     acetaminophen, 650 mg, Oral, Q6H PRN    hydrOXYzine pamoate, 50 mg, Oral, Q6H PRN    loperamide, 2 mg, Oral, PRN    LORazepam, 1 mg, Oral, Q6H PRN    melatonin, 6 mg, Oral, Nightly PRN    ondansetron, 4 mg, Oral, Q8H PRN        EXAMINATION    VITALS   Vitals:    06/12/24 0545 06/12/24 0730 06/12/24 0800 06/12/24 1045   BP: 130/82 112/73  135/73   BP Location: Right arm Right arm  Right arm   Patient Position: Sitting Sitting  Sitting   Pulse: 110 110  106   Resp: 18 18  16   Temp: 97.3 °F (36.3 °C) 97.4 °F (36.3 °C)  97.3 °F (36.3 °C)   TempSrc: Temporal Temporal  Temporal   Weight:   84.4 kg (186 lb 1.1 oz)    Height:           Body mass index is 25.24 kg/m².        CONSTITUTIONAL  General Appearance: unremarkable, age appropriate    MUSCULOSKELETAL  Muscle Strength and Tone:no tic, less tremor  Abnormal Involuntary Movements: No  Gait and Station: non-ataxic    PSYCHIATRIC   Level of Consciousness: awake and alert   Orientation: person, place, and situation  Grooming: Hospital garb and Well groomed  Psychomotor Behavior: normal, cooperative  Speech: normal tone, normal rate, normal pitch, normal volume  Language: grossly intact  Mood: depressed  Affect: Consistent with mood  Thought Process: linear, logical  Associations: intact   Thought Content: less SI, denies HI, and no delusions  Perceptions: denies AH, denies  VH, no TH, and not RIS  Memory: Able to recall past events, Remote intact, and Recent intact  Attention:Attends to interview without distraction  Fund of Knowledge: Aware of  current events and Vocabulary appropriate   Estimate if Intelligence:  Average based on work/education history, vocabulary and mental status exam  Insight: has awareness of illness  Judgment: behavior is adequate to circumstances        DIAGNOSTIC TESTING   Laboratory Results  No results found for this or any previous visit (from the past 24 hour(s)).        MEDICAL DECISION MAKING          ASSESSMENT         MDD, severe  Unspecified psychosis  SI  Anxiety  PTSD  Alcohol abuse        PROBLEM LIST AND MANAGEMENT PLANS           MDD  - continue lexapro 20 mg pO qd  - resume abilify 5 mg PO qd  - pt counseled  - follow up with outpatient mental health providers after discharge for medication management and psychotherapy        Psychosis  - r/o SIPD  - Abilify as above  - pt counseled  - follow up with outpatient mental health providers after discharge for medication management and psychotherapy     Suicidal ideations  - continue psychiatric hospitalization  - provide psychotherapeutic interventions and medication management     Anxiety  - start hydroxyzine 50 mg PO q 6 hours PRN  - lexapro as above  - pt counseled  - follow up with outpatient mental health providers after discharge for medication management and psychotherapy     PTSD   - lexapro as above  - pt counseled  - follow up with outpatient mental health providers after discharge for medication management and psychotherapy     Alcohol abuse  Alcohol Brief Intervention     Discussed with patient that there is concern he/she is drinking at unhealthy levels known to increase his/her risk of alcohol-related health problems - Yes  Discussed general feedback on health risks associated with drinking and/or given personalized feedback - Yes  Patient was advised to abstain from alcohol use - Yes     - change valium 10 mg PO q 4 hours to 20 mg PO TID  - ativan 1 mg PO q 6 hours PRN for breakthrough w/d parameters  - consider vivitrol prior to discharge  - consider  gabapentin                Discussed diagnosis, risks and benefits of proposed treatment vs alternative treatments vs no treatment, potential side effects of these treatments and the inherent unpredictability of treatment. The patient expresses understanding of the above and displays the capacity to agree with this treatment given said understanding. Patient also agrees that, currently, the benefits outweigh the risks and would like to pursue/continue treatment at this time.    Any medications being used off-label were discussed with the patient inclusive of the evidence base for the use of the medications and consent was obtained for the off-label use of the medication.       DISCHARGE PLANNING  Expected Disposition Plan: Home or Self Care      NEED FOR CONTINUED HOSPITALIZATION  Psychiatric illness continues to pose a potential threat to life or bodily function, of self or others, thereby requiring the need for continued inpatient psychiatric hospitalization: Yes, due to: danger to self, as evidenced by:  Ongoing concerns with SI.    Protective inpatient pyschiatric hospitalization required while a safe disposition plan is enacted: Yes    Patient stabilized and ready for discharge from inpatient psychiatric unit: No        STAFF:   Adalberto Oneal III, MD  Psychiatry

## 2024-06-12 NOTE — PLAN OF CARE
Lying quietly in bed, eyes closed, respirations even, unlabored. Apparently asleep. Slept 7.5 hours thus far with 2 interruptions. Safety precautions maintained. Rounds done every 15 minutes. Bed is fixed in low position and room is uncluttered and pathways are clear.

## 2024-06-12 NOTE — PROGRESS NOTES
"   06/12/24 1400   [facility] Group Therapy   Group Name Therapeutic Recreation   Specific Interventions Cognitive Stimulation Training   Participation Level Appropriate;Attentive;Sharing   Participation Quality Cooperative;Social   Insight/Motivation Applies New Skills;Good   Affect/Mood Display Appropriate   Cognition Alert   Psychomotor WNL     Patient presents calm, cooperative, motivated to participate, reports "good" mood, complains of withdrawal symptome, shakes, upset stomach and diarrhea. Patient states his leisure time is important because he likes to isolate, "drinking is a part of my life. I drink 1.75 liters of vodka straight every day." Patient verbalized healthy leisure activities of watching TV and listening to music.  "

## 2024-06-12 NOTE — PLAN OF CARE
"Patient calm and cooperative. No complaints voiced. Med compliant. Appetite adequate. Slight tremors noted. Pt states he started drinking alcohol when he was 5 years old and his whole family drinks and "we all party all the time". Pt refusing rehab stating he can't because he takes care of his handicap wife at home. Pt rates depression 4/10 on scale 1-10 with 10 being worse level of depression and rates anxiety 3/10 on same scale. Patient denies SI/HI/Hallucinations, no self harming behavior displayed, no aggressive behavior displayed towards others. Patient contracted safety with staff and unit. Discussed healthy coping skills and techniques. Educated, reviewed and discussed plan of care and medication regiment with this patient. 1:1 with patient. Patient voiced understanding of all teachings.          "

## 2024-06-12 NOTE — HPI
Patient is a 53 year old male with medical history of alcohol use disorder (1.75 L of alcohol a day), tobacco use disorder, COPD, anxiety and depression who was admitted to [facility] for alcohol use disorder.  Hospital medicine consulted for medical management.

## 2024-06-12 NOTE — PROGRESS NOTES
06/12/24 1045   [facility] Group Therapy   Group Name Education  (turning over the leaf activity discharge planning skilled worksheet)   Specific Interventions Coping Skills Training   Participation Level Sharing;Appropriate   Participation Quality Cooperative;Social   Insight/Motivation Good   Affect/Mood Display Appropriate   Cognition Alert   Psychomotor WNL     Patient presents cooperative, motivated, socialized and shared answers. Patient list activities he could do after discharge to cope with stress, sex & cooking, walk to the park and play with his pet. Patient accepted coping skill worksheet focused on health habit forming to prevent relapse.

## 2024-06-12 NOTE — PLAN OF CARE
"Patient reports feeling "okay" this evening, spending majority of time in room and hallway. Interacting appropriately with staff and peers. Denies SI/HI. Denies hallucinations. Appetite adequate. PRN melatonin administered to aid with sleep. PRN Vistaril administered to aid with anxiety. Remains calm and cooperative. NADN. Medication compliant. Safety precautions remain in place.   "

## 2024-06-13 PROBLEM — I10 HYPERTENSION: Status: ACTIVE | Noted: 2024-06-13

## 2024-06-13 PROCEDURE — 99231 SBSQ HOSP IP/OBS SF/LOW 25: CPT | Mod: SA,HB,, | Performed by: PHYSICIAN ASSISTANT

## 2024-06-13 PROCEDURE — 25000003 PHARM REV CODE 250: Performed by: PHYSICIAN ASSISTANT

## 2024-06-13 PROCEDURE — S4991 NICOTINE PATCH NONLEGEND: HCPCS | Performed by: STUDENT IN AN ORGANIZED HEALTH CARE EDUCATION/TRAINING PROGRAM

## 2024-06-13 PROCEDURE — 25000003 PHARM REV CODE 250: Performed by: STUDENT IN AN ORGANIZED HEALTH CARE EDUCATION/TRAINING PROGRAM

## 2024-06-13 PROCEDURE — 11400000 HC PSYCH PRIVATE ROOM

## 2024-06-13 PROCEDURE — 90833 PSYTX W PT W E/M 30 MIN: CPT | Mod: AF,HB,, | Performed by: STUDENT IN AN ORGANIZED HEALTH CARE EDUCATION/TRAINING PROGRAM

## 2024-06-13 PROCEDURE — 99233 SBSQ HOSP IP/OBS HIGH 50: CPT | Mod: AF,HB,, | Performed by: STUDENT IN AN ORGANIZED HEALTH CARE EDUCATION/TRAINING PROGRAM

## 2024-06-13 RX ORDER — DIAZEPAM 10 MG/1
10 TABLET ORAL 3 TIMES DAILY
Status: DISCONTINUED | OUTPATIENT
Start: 2024-06-13 | End: 2024-06-17

## 2024-06-13 RX ADMIN — ONDANSETRON 4 MG: 4 TABLET, ORALLY DISINTEGRATING ORAL at 08:06

## 2024-06-13 RX ADMIN — ESCITALOPRAM 20 MG: 20 TABLET, FILM COATED ORAL at 08:06

## 2024-06-13 RX ADMIN — PANTOPRAZOLE SODIUM 40 MG: 40 TABLET, DELAYED RELEASE ORAL at 08:06

## 2024-06-13 RX ADMIN — DIAZEPAM 10 MG: 10 TABLET ORAL at 08:06

## 2024-06-13 RX ADMIN — LOPERAMIDE HYDROCHLORIDE 2 MG: 2 CAPSULE ORAL at 08:06

## 2024-06-13 RX ADMIN — Medication 100 MG: at 08:06

## 2024-06-13 RX ADMIN — LOPERAMIDE HYDROCHLORIDE 2 MG: 2 CAPSULE ORAL at 07:06

## 2024-06-13 RX ADMIN — HYDROXYZINE PAMOATE 50 MG: 50 CAPSULE ORAL at 08:06

## 2024-06-13 RX ADMIN — ARIPIPRAZOLE 5 MG: 5 TABLET ORAL at 08:06

## 2024-06-13 RX ADMIN — ACETAMINOPHEN 650 MG: 325 TABLET ORAL at 08:06

## 2024-06-13 RX ADMIN — ONDANSETRON 4 MG: 4 TABLET, ORALLY DISINTEGRATING ORAL at 07:06

## 2024-06-13 RX ADMIN — LISINOPRIL 10 MG: 10 TABLET ORAL at 08:06

## 2024-06-13 RX ADMIN — DIAZEPAM 10 MG: 10 TABLET ORAL at 02:06

## 2024-06-13 RX ADMIN — NICOTINE 1 PATCH: 21 PATCH, EXTENDED RELEASE TRANSDERMAL at 08:06

## 2024-06-13 RX ADMIN — FOLIC ACID 1 MG: 1 TABLET ORAL at 08:06

## 2024-06-13 RX ADMIN — THERA TABS 1 TABLET: TAB at 08:06

## 2024-06-13 RX ADMIN — DIAZEPAM 20 MG: 10 TABLET ORAL at 08:06

## 2024-06-13 RX ADMIN — Medication 6 MG: at 08:06

## 2024-06-13 NOTE — PROGRESS NOTES
"   06/13/24 1015   [facility] Group Therapy   Group Name Stress Management   Specific Interventions Coping Skills Training  (identified stress symptoms and ways to relieve stress)   Participation Level Sharing;Appropriate   Participation Quality Cooperative;Social   Insight/Motivation Improved;Applies New Skills   Affect/Mood Display Appropriate   Cognition Alert   Psychomotor WNL     Patient presents calm, cooperative, socialized with peers, remained alert and involved. Patient reports "good shape" mood. Patient shared when he is stressed he gets angry and punch a punching bag to relieve stress.   "

## 2024-06-13 NOTE — SUBJECTIVE & OBJECTIVE
Past Medical History:   Diagnosis Date    Addiction to drug     Alcohol abuse     last drink 9/06/22    Anxiety     Bipolar disorder     COPD (chronic obstructive pulmonary disease)     Depression     Disc disorder     Fatigue     Headache     History of psychiatric hospitalization     Hx of psychiatric care     Panic disorder     Psychiatric problem     PTSD (post-traumatic stress disorder)     Sleep difficulties     Suicide attempt     Therapy     Withdrawal symptoms, alcohol        Past Surgical History:   Procedure Laterality Date    APPENDECTOMY      COLONOSCOPY      polyps    HERNIA REPAIR      x 2       Review of patient's allergies indicates:   Allergen Reactions    Diphenoxylate-atropine Shortness Of Breath and Other (See Comments)     Esophagus tightens       No current facility-administered medications on file prior to encounter.     Current Outpatient Medications on File Prior to Encounter   Medication Sig    diazePAM (VALIUM) 10 MG Tab Take 1 tablet (10 mg total) by mouth every 4 (four) hours.    albuterol (PROVENTIL/VENTOLIN HFA) 90 mcg/actuation inhaler Inhale 2 puffs into the lungs every 4 (four) hours as needed for Wheezing or Shortness of Breath.    ARIPiprazole (ABILIFY) 5 MG Tab Take 1 tablet (5 mg total) by mouth once daily.    cetirizine (ZYRTEC) 10 MG tablet Take 1 tablet (10 mg total) by mouth once daily.    fluticasone propionate (FLOVENT HFA) 110 mcg/actuation inhaler Inhale 2 puffs into the lungs 2 (two) times daily. Controller    haloperidoL (HALDOL) 10 MG tablet Take 1 tablet (10 mg total) by mouth every 6 (six) hours as needed.    hydrOXYzine pamoate (VISTARIL) 50 MG Cap Take 1 capsule (50 mg total) by mouth nightly as needed (insomnia).    lisinopriL 10 MG tablet Take 1 tablet (10 mg total) by mouth once daily.    nicotine (NICODERM CQ) 21 mg/24 hr Place 1 patch onto the skin once daily.    pantoprazole (PROTONIX) 40 MG tablet Take 1 tablet (40 mg total) by mouth once daily.     thiamine 100 MG tablet Take 1 tablet (100 mg total) by mouth once daily.    tiotropium-olodateroL (STIOLTO RESPIMAT) 2.5-2.5 mcg/actuation Mist Inhale 2 puffs into the lungs once daily. Controller     Family History       Problem Relation (Age of Onset)    Alcohol abuse Maternal Grandfather, Paternal Grandfather    Anxiety disorder Mother    Depression Mother    Heart block Father    No Known Problems Sister, Brother          Tobacco Use    Smoking status: Every Day     Current packs/day: 1.00     Average packs/day: 1 pack/day for 38.4 years (38.4 ttl pk-yrs)     Types: Cigarettes     Start date: 1/8/1986    Smokeless tobacco: Never   Substance and Sexual Activity    Alcohol use: Yes     Comment: daily drinker--alcohol abuse    Drug use: Not Currently     Types: Marijuana    Sexual activity: Yes     Birth control/protection: None     Review of Systems   Constitutional:  Negative for chills and fever.   HENT:  Negative for ear discharge and ear pain.    Eyes:  Negative for pain and discharge.   Respiratory:  Negative for cough and shortness of breath.    Cardiovascular:  Negative for chest pain and leg swelling.   Gastrointestinal:  Negative for nausea and vomiting.   Endocrine: Negative for cold intolerance and heat intolerance.   Genitourinary:  Negative for difficulty urinating and dysuria.   Musculoskeletal:  Negative for joint swelling and myalgias.   Skin:  Negative for rash and wound.   Neurological:  Negative for dizziness and headaches.   Psychiatric/Behavioral:  Negative for agitation and confusion.      Objective:     Vital Signs (Most Recent):  Temp: 97.7 °F (36.5 °C) (06/13/24 1200)  Pulse: 98 (06/13/24 1200)  Resp: 16 (06/13/24 1200)  BP: 114/76 (06/13/24 1200)  SpO2: 99 % (06/12/24 1700) Vital Signs (24h Range):  Temp:  [97 °F (36.1 °C)-97.7 °F (36.5 °C)] 97.7 °F (36.5 °C)  Pulse:  [] 98  Resp:  [16-18] 16  SpO2:  [99 %] 99 %  BP: (114-136)/(64-85) 114/76     Weight: 84.4 kg (186 lb 1.1 oz)  Body  mass index is 25.24 kg/m².     Physical Exam  Constitutional:       General: He is not in acute distress.  HENT:      Head: Normocephalic and atraumatic.   Eyes:      General:         Right eye: No discharge.         Left eye: No discharge.   Cardiovascular:      Rate and Rhythm: Normal rate and regular rhythm.   Pulmonary:      Effort: Pulmonary effort is normal.      Breath sounds: Normal breath sounds.   Abdominal:      General: There is no distension.      Tenderness: There is no abdominal tenderness.   Musculoskeletal:         General: No swelling or tenderness.      Cervical back: Neck supple. No tenderness.   Skin:     General: Skin is warm and dry.   Neurological:      General: No focal deficit present.      Mental Status: He is alert and oriented to person, place, and time.      Comments: CN II: Visual Fields full to confrontation  CN III, IV , VI PERRL   CN III: no palsy   Nystagmus: none  Diplopia: none  Ophthalmoparesis: none  CN V Facial sensation intact  CN VII facial expression full, symmetric  CN VIII normal  CN IX normal  CN X normal  CN XI normal  CN XII normal          Psychiatric:         Mood and Affect: Mood normal.         Behavior: Behavior normal.          Significant Labs: UPT  No results found for this or any previous visit.  U/A  No results found for this or any previous visit.  UDS  Results for orders placed or performed during the hospital encounter of 06/06/24   Drug screen panel, emergency   Result Value Ref Range    Benzodiazepines Presumptive Positive (A) Negative    Methadone metabolites Negative Negative    Cocaine (Metab.) Negative Negative    Opiate Scrn, Ur Negative Negative    Barbiturate Screen, Ur Negative Negative    Amphetamine Screen, Ur Negative Negative    THC Negative Negative    Phencyclidine Negative Negative    Creatinine, Urine 227.4 23.0 - 375.0 mg/dL    Toxicology Information SEE COMMENT      CBC  Results for orders placed or performed during the hospital  encounter of 06/06/24   CBC auto differential   Result Value Ref Range    WBC 5.94 3.90 - 12.70 K/uL    RBC 4.58 (L) 4.60 - 6.20 M/uL    Hemoglobin 14.5 14.0 - 18.0 g/dL    Hematocrit 42.7 40.0 - 54.0 %    MCV 93 82 - 98 fL    MCH 31.7 (H) 27.0 - 31.0 pg    MCHC 34.0 32.0 - 36.0 g/dL    RDW 15.5 (H) 11.5 - 14.5 %    Platelets 166 150 - 450 K/uL    MPV 10.1 9.2 - 12.9 fL    Immature Granulocytes 0.2 0.0 - 0.5 %    Gran # (ANC) 4.3 1.8 - 7.7 K/uL    Immature Grans (Abs) 0.01 0.00 - 0.04 K/uL    Lymph # 1.1 1.0 - 4.8 K/uL    Mono # 0.4 0.3 - 1.0 K/uL    Eos # 0.1 0.0 - 0.5 K/uL    Baso # 0.03 0.00 - 0.20 K/uL    nRBC 0 0 /100 WBC    Gran % 72.0 38.0 - 73.0 %    Lymph % 18.2 18.0 - 48.0 %    Mono % 7.4 4.0 - 15.0 %    Eosinophil % 1.7 0.0 - 8.0 %    Basophil % 0.5 0.0 - 1.9 %    Differential Method Automated      CMP  Results for orders placed or performed during the hospital encounter of 06/06/24   Comprehensive metabolic panel   Result Value Ref Range    Sodium 139 136 - 145 mmol/L    Potassium 3.4 (L) 3.5 - 5.1 mmol/L    Chloride 107 95 - 110 mmol/L    CO2 21 (L) 23 - 29 mmol/L    Glucose 86 70 - 110 mg/dL    BUN 11 6 - 20 mg/dL    Creatinine 0.8 0.5 - 1.4 mg/dL    Calcium 9.7 8.7 - 10.5 mg/dL    Total Protein 6.2 6.0 - 8.4 g/dL    Albumin 3.5 3.5 - 5.2 g/dL    Total Bilirubin 0.6 0.1 - 1.0 mg/dL    Alkaline Phosphatase 65 55 - 135 U/L    AST 25 10 - 40 U/L    ALT 27 10 - 44 U/L    eGFR >60 >60 mL/min/1.73 m^2    Anion Gap 11 8 - 16 mmol/L     TSH  Results for orders placed or performed during the hospital encounter of 01/15/24   TSH   Result Value Ref Range    TSH 0.926 0.400 - 4.000 uIU/mL     ETOH  Results for orders placed or performed during the hospital encounter of 06/06/24   Ethanol   Result Value Ref Range    Alcohol, Serum <10 <10 mg/dL     Salicylate  No results found for this or any previous visit.  Acetaminophen  Results for orders placed or performed during the hospital encounter of 01/15/24    Acetaminophen level   Result Value Ref Range    Acetaminophen (Tylenol), Serum <3.0 (L) 10.0 - 20.0 ug/mL             Significant Imaging: none
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
## 2024-06-13 NOTE — PROGRESS NOTES
"PSYCHIATRY DAILY INPATIENT PROGRESS NOTE  SUBSEQUENT HOSPITAL VISIT    ENCOUNTER DATE: 6/13/2024  SITE: [facility]    DATE OF ADMISSION: 6/11/2024  3:58 PM  LENGTH OF STAY: 2 days      CHIEF COMPLAINT   Maicol Millan III is a 53 y.o. male, seen during daily horne rounds on the inpatient unit.  Maicol Millan III presented with the chief complaint of depression, anxiety, and substance problems      The patient was seen and examined. The chart was reviewed.     Reviewed notes from Rns and CTRS and labs from the last 24 hours.    The patient's case was discussed with the treatment team/care providers today including Rns    Staff reports no behavioral or management issues.     The patient has been compliant with treatment.      Subjective 06/13/2024    Discussed sobriety at length. He discussed lack of transportation as an issue. Discussed coping skills.     He is unsure if he wants to go to rehab.    Pt reports he drinks to "numb" past trauma. Explored consequences of continued drinking and benefits of quitting.    The patient denies any side effects to medications.    At this time symptoms remains severe, functionally and behaviorally impairing and pt remains in need of continued acute inpatient hospitalization for both safety and stabilization.           Interim/overnight events per report/notes:  Per RN:  Isolating in room most of evening. Said he's nauseated and not feeling good and that's why he's in his room. Prn zofran given at 19:40 for "nausea". Imodium given for diarrhea at 20:36. Melatonin and hydroxyzine given at 20:36 for sleep and anxiety. Regarding rehab, pt said he was supposed to talk to his wife about it tonight on phone "but then I started feeling bad and couldn't talk to her".       Psychiatric ROS (observed, reported, or endorsed/denied):  Depressed mood - Continuing  Interest/pleasure/anhedonia: fluctuating  Guilt/hopelessness/worthlessness - Continuing  Changes in Sleep - " less  Changes in Appetite - less  Changes in Concentration - fluctuating  Changes in Energy - fluctuating  PMA/R- fluctuating  Suicidal- active/passive ideations - fluctuating  Homicidal ideations: active/passive ideations - No    Hallucinations - No  Delusions - No  Disorganized behavior - No  Disorganized speech - No  Negative symptoms - No    Elevated mood - No  Decreased need for sleep - No  Grandiosity - No  Racing thoughts - No  Impulsivity - No  Irritability- No  Increased energy - No  Distractibility - No  Increase in goal-directed activity or PMA- No    Symptoms of CAROLA - fluctuating  Symptoms of Panic Disorder- fluctuating  Symptoms of PTSD - fluctuating        Overall progress: Patient is showing mild improvement         Psychotherapy:  Target symptoms: depression, anxiety , substance abuse  Why chosen therapy is appropriate versus another modality: relevant to diagnosis  Outcome monitoring methods: self-report  Therapeutic intervention type: supportive psychotherapy  Topics discussed/themes: building skills sets for symptom management, symptom recognition  The patient's response to the intervention is accepting. The patient's progress toward treatment goals is fair.   Duration of intervention: 16 minutes.        Medical ROS  Review of Systems   Constitutional:  Negative for chills and fever.   HENT:  Negative for hearing loss.    Eyes:  Negative for blurred vision and double vision.   Respiratory:  Negative for shortness of breath.    Cardiovascular:  Negative for chest pain and palpitations.   Gastrointestinal:  Negative for constipation, diarrhea, nausea and vomiting.   Genitourinary:  Negative for dysuria and urgency.   Musculoskeletal:  Negative for joint pain.   Skin:  Negative for rash.   Neurological:  Negative for dizziness and headaches.   Endo/Heme/Allergies:  Negative for environmental allergies.         PAST MEDICAL HISTORY   Past Medical History:   Diagnosis Date    Addiction to drug      Alcohol abuse     last drink 9/06/22    Anxiety     Bipolar disorder     COPD (chronic obstructive pulmonary disease)     Depression     Disc disorder     Fatigue     Headache     History of psychiatric hospitalization     Hx of psychiatric care     Panic disorder     Psychiatric problem     PTSD (post-traumatic stress disorder)     Sleep difficulties     Suicide attempt     Therapy     Withdrawal symptoms, alcohol            PSYCHOTROPIC MEDICATIONS   Scheduled Meds:   ARIPiprazole  5 mg Oral Daily    diazePAM  20 mg Oral TID    EScitalopram oxalate  20 mg Oral Daily    folic acid  1 mg Oral Daily    lisinopriL  10 mg Oral Daily    multivitamin  1 tablet Oral Daily    nicotine  1 patch Transdermal Daily    pantoprazole  40 mg Oral Daily    thiamine  100 mg Oral Daily     Continuous Infusions:  PRN Meds:.  Current Facility-Administered Medications:     acetaminophen, 650 mg, Oral, Q6H PRN    albuterol, 2 puff, Inhalation, Q6H PRN **AND** MDI Q6H PRN, , , Q6H PRN    hydrOXYzine pamoate, 50 mg, Oral, Q6H PRN    loperamide, 2 mg, Oral, PRN    LORazepam, 1 mg, Oral, Q6H PRN    melatonin, 6 mg, Oral, Nightly PRN    ondansetron, 4 mg, Oral, Q8H PRN        EXAMINATION    VITALS   Vitals:    06/12/24 1700 06/12/24 1945 06/13/24 0200 06/13/24 0745   BP: 136/85 129/79 136/80 120/64   BP Location: Right arm Right arm Left arm Right arm   Patient Position: Sitting Sitting Lying Sitting   Pulse: 102 94 104 89   Resp: 18 18 18 18   Temp: 97.3 °F (36.3 °C) 97.5 °F (36.4 °C) 97.3 °F (36.3 °C) 97 °F (36.1 °C)   TempSrc: Temporal Temporal Temporal Temporal   SpO2: 99%      Weight:       Height:           Body mass index is 25.24 kg/m².        CONSTITUTIONAL  General Appearance: unremarkable, age appropriate    MUSCULOSKELETAL  Muscle Strength and Tone:no tic, less tremor  Abnormal Involuntary Movements: No  Gait and Station: non-ataxic    PSYCHIATRIC   Level of Consciousness: awake and alert   Orientation: person, place, and  situation  Grooming: Hospital garb and Well groomed  Psychomotor Behavior: normal, cooperative  Speech: normal tone, normal rate, normal pitch, normal volume  Language: grossly intact  Mood: depressed  Affect: Consistent with mood  Thought Process: linear, logical  Associations: intact   Thought Content: less SI, denies HI, and no delusions  Perceptions: denies AH, denies  VH, no TH, and not RIS  Memory: Able to recall past events, Remote intact, and Recent intact  Attention:Attends to interview without distraction  Fund of Knowledge: Aware of current events and Vocabulary appropriate   Estimate if Intelligence:  Average based on work/education history, vocabulary and mental status exam  Insight: has awareness of illness  Judgment: behavior is adequate to circumstances        DIAGNOSTIC TESTING   Laboratory Results  No results found for this or any previous visit (from the past 24 hour(s)).        MEDICAL DECISION MAKING          ASSESSMENT         MDD, severe  Unspecified psychosis  SI  Anxiety  PTSD  Alcohol abuse        PROBLEM LIST AND MANAGEMENT PLANS           MDD  - continue lexapro 20 mg pO qd  - resume abilify 5 mg PO qd  -increase to 7 mg PO qd tomorrow  - pt counseled  - follow up with outpatient mental health providers after discharge for medication management and psychotherapy        Psychosis  - r/o SIPD  - Abilify as above  - pt counseled  - follow up with outpatient mental health providers after discharge for medication management and psychotherapy     Suicidal ideations  - continue psychiatric hospitalization  - provide psychotherapeutic interventions and medication management     Anxiety  - started/continue hydroxyzine 50 mg PO q 6 hours PRN  - lexapro as above  - pt counseled  - follow up with outpatient mental health providers after discharge for medication management and psychotherapy     PTSD   - lexapro as above  - pt counseled  - follow up with outpatient mental health providers after  discharge for medication management and psychotherapy     Alcohol abuse  Alcohol Brief Intervention     Discussed with patient that there is concern he/she is drinking at unhealthy levels known to increase his/her risk of alcohol-related health problems - Yes  Discussed general feedback on health risks associated with drinking and/or given personalized feedback - Yes  Patient was advised to abstain from alcohol use - Yes     - change valium 10 mg PO q 4 hours to 20 mg PO TID  -decrease to 20 mg/10/10 mg today then 10 mg PO TID  - ativan 1 mg PO q 6 hours PRN for breakthrough w/d parameters  - consider vivitrol prior to discharge  - consider gabapentin                Discussed diagnosis, risks and benefits of proposed treatment vs alternative treatments vs no treatment, potential side effects of these treatments and the inherent unpredictability of treatment. The patient expresses understanding of the above and displays the capacity to agree with this treatment given said understanding. Patient also agrees that, currently, the benefits outweigh the risks and would like to pursue/continue treatment at this time.    Any medications being used off-label were discussed with the patient inclusive of the evidence base for the use of the medications and consent was obtained for the off-label use of the medication.       DISCHARGE PLANNING  Expected Disposition Plan: Home or Self Care      NEED FOR CONTINUED HOSPITALIZATION  Psychiatric illness continues to pose a potential threat to life or bodily function, of self or others, thereby requiring the need for continued inpatient psychiatric hospitalization: Yes, due to: danger to self, as evidenced by:  Ongoing concerns with SI.    Protective inpatient pyschiatric hospitalization required while a safe disposition plan is enacted: Yes    Patient stabilized and ready for discharge from inpatient psychiatric unit: No        STAFF:   Adalberto Oneal III, MD  Psychiatry

## 2024-06-13 NOTE — PLAN OF CARE
"Isolating in room most of evening.  Said he's nauseated and not feeling good and that's why he's in his room.  Prn zofran given at 19:40 for "nausea".  Imodium given for diarrhea at 20:36.  Melatonin and hydroxyzine given at 20:36 for sleep and anxiety.  Regarding rehab, pt said he was supposed to talk to his wife about it tonight on phone "but then I started feeling bad and couldn't talk to her".  Encouraged increased fluids and regular meals.  VS stable.  Blunted affect.  Denies suicidal thoughts at this time.  Accepted hs medications.  Stressed compliance with prescribed medications upon discharge.        "

## 2024-06-13 NOTE — ASSESSMENT & PLAN NOTE
Chronic, controlled. Latest blood pressure and vitals reviewed-     Temp:  [97 °F (36.1 °C)-97.7 °F (36.5 °C)]   Pulse:  []   Resp:  [16-18]   BP: (114-136)/(64-85)   SpO2:  [99 %] .   Home meds for hypertension were reviewed and noted below.   Hypertension Medications               lisinopriL 10 MG tablet Take 1 tablet (10 mg total) by mouth once daily.            While in the hospital, will manage blood pressure as follows; Continue home antihypertensive regimen    Will utilize p.r.n. blood pressure medication only if patient's blood pressure greater than 140/90 and he develops symptoms such as worsening chest pain or shortness of breath.

## 2024-06-13 NOTE — PLAN OF CARE
Patient out on unit more. Interacts well with select peers. Med compliant. Appetite adequate. Poor insight into alcohol abuse. Continues to refuse rehab. Pt rates depression 3/10 on scale 1-10 with 10 being worse level of depression and rates anxiety 3/10 on same scale. Patient denies SI/HI/Hallucinations, no self harming behavior displayed, no aggressive behavior displayed towards others. Patient contracted safety with staff and unit. Discussed healthy coping skills and techniques. Educated, reviewed and discussed plan of care and medication regiment with this patient. 1:1 with patient. Patient voiced understanding of all teachings.

## 2024-06-13 NOTE — PROGRESS NOTES
"   06/12/24 5368   Assessment   Patient's Identification of the Problem Patient reports "good" mood, complains of withdrawal symptoms of the shakes, stomach bothering me and diiarrhea. Patient states his admit is due to "alcoholism. I would be walking and it'd feel like I was going to pass out. I would have to hold myself up and crawl to the bathroom and throw up. I wasn't drunk." Patient reports a history of depression and anxiety and verbalized he stop taking his medicine about 2 months ago, "my body adapts and then they stop working." Per H&P chief compliant: Alcohol w/d. Patient admits to negative leisure lifestyle of cigarettes, alcohol (1.75 liters of straight vodka, every day). Patient reports that he is in a committed relationship, does not have children, is a high school graduate, is unemployed, is suppose to wear glasses, lives in [place] with his girlfriend of 15 years. Patient verbalized his main goal, "try to get sober."   Leisure Interest Watching TV;Movies;Listen to Music;Cooking;Enjoy Family/Friends   Leisure Barriers Lack of Finances;Loss of Interest;Lack of Transportation;In Pain;ETOH Use;Fears/Phobias;Physical Limitation  (got 3 sliped disc in his back,fear height, alcoholism, when depressed)   Treatment Focus To Improve Mood;To Increase Motivation;To Improve Leisure Awareness/Lifestyle/Interest;To Promote Successful and Safe Self Expression;To Improve Coping Skills;To Educate and Increase Awareness of Sober Free Activities       Treatment Recommendation:   1:1 Intervention (as needed)    Cognitive Stimulation Skilled Activity  Self Expression Skilled Activity  Mild Exercises Skilled Activity  Coping Skilled Activity  Leisure Education and Awareness Skilled Activity    Treatment Goal(s):  Long Term Goals Refer To Master Treatment Plan    Short Term Treatment Goal(s)  Patient Will:  Comply with Treatment  Exhibit Improvement in Mood  Demonstrate Constructive Expression of Feelings and " Behavior  Verbalize Improvement in Mood  Identify at Least 2 Coping Skills or Leisure Skills to Reduce Depression and Hopelessness Upon Request from Therapist  Identify (+) Leisure / Recreation Activities that Promote Wellness and Promote a Sober Lifestyle    Discharge Recommendations:  Encourage Patient to Actively Utilize Available Community Resources to Increase Leisure Involvement to Decrease Signs and Symptoms of Illness  Encourage Patient to Utilize Coping Skills on a Regular Basis to Reduce the Risk of Decompensating and Re-Hospitalizations  AA/NA Meetings  Follow Up with After Care Appointments

## 2024-06-13 NOTE — PROGRESS NOTES
06/13/24 1400   [facility] Group Therapy   Group Name Other  (1:1 attempt)   Participation Level None     Patient was not feeling well and did not attend group. Patient was in his room, using the bathroom.

## 2024-06-13 NOTE — CONSULTS
[facility] Medicine  Consult Note    Patient Name: Maicol Millan III  MRN: [id number]  Admission Date: 6/11/2024  Hospital Length of Stay: 2 days  Attending Physician: Adalberto Oneal III, MD   Primary Care Provider: Manisha Hopkins NP           Patient information was obtained from patient and ER records.     Inpatient consult to [facility] for History and Physical  Consult performed by: Gloria Anguiano PA-C  Consult ordered by: Adalberto Oneal III, MD        Subjective:     Principal Problem: <principal problem not specified>    Chief Complaint: No chief complaint on file.       HPI: Patient is a 53 year old male with medical history of alcohol use disorder (1.75 L of alcohol a day), tobacco use disorder, COPD, anxiety and depression who was admitted to [facility] for alcohol use disorder.  Hospital medicine consulted for medical management.             Past Medical History:   Diagnosis Date    Addiction to drug     Alcohol abuse     last drink 9/06/22    Anxiety     Bipolar disorder     COPD (chronic obstructive pulmonary disease)     Depression     Disc disorder     Fatigue     Headache     History of psychiatric hospitalization     Hx of psychiatric care     Panic disorder     Psychiatric problem     PTSD (post-traumatic stress disorder)     Sleep difficulties     Suicide attempt     Therapy     Withdrawal symptoms, alcohol        Past Surgical History:   Procedure Laterality Date    APPENDECTOMY      COLONOSCOPY      polyps    HERNIA REPAIR      x 2       Review of patient's allergies indicates:   Allergen Reactions    Diphenoxylate-atropine Shortness Of Breath and Other (See Comments)     Esophagus tightens       No current facility-administered medications on file prior to encounter.     Current Outpatient Medications on File Prior to Encounter   Medication Sig    diazePAM (VALIUM) 10 MG Tab Take 1 tablet (10 mg total) by mouth every 4 (four) hours.    albuterol  (PROVENTIL/VENTOLIN HFA) 90 mcg/actuation inhaler Inhale 2 puffs into the lungs every 4 (four) hours as needed for Wheezing or Shortness of Breath.    ARIPiprazole (ABILIFY) 5 MG Tab Take 1 tablet (5 mg total) by mouth once daily.    cetirizine (ZYRTEC) 10 MG tablet Take 1 tablet (10 mg total) by mouth once daily.    fluticasone propionate (FLOVENT HFA) 110 mcg/actuation inhaler Inhale 2 puffs into the lungs 2 (two) times daily. Controller    haloperidoL (HALDOL) 10 MG tablet Take 1 tablet (10 mg total) by mouth every 6 (six) hours as needed.    hydrOXYzine pamoate (VISTARIL) 50 MG Cap Take 1 capsule (50 mg total) by mouth nightly as needed (insomnia).    lisinopriL 10 MG tablet Take 1 tablet (10 mg total) by mouth once daily.    nicotine (NICODERM CQ) 21 mg/24 hr Place 1 patch onto the skin once daily.    pantoprazole (PROTONIX) 40 MG tablet Take 1 tablet (40 mg total) by mouth once daily.    thiamine 100 MG tablet Take 1 tablet (100 mg total) by mouth once daily.    tiotropium-olodateroL (STIOLTO RESPIMAT) 2.5-2.5 mcg/actuation Mist Inhale 2 puffs into the lungs once daily. Controller     Family History       Problem Relation (Age of Onset)    Alcohol abuse Maternal Grandfather, Paternal Grandfather    Anxiety disorder Mother    Depression Mother    Heart block Father    No Known Problems Sister, Brother          Tobacco Use    Smoking status: Every Day     Current packs/day: 1.00     Average packs/day: 1 pack/day for 38.4 years (38.4 ttl pk-yrs)     Types: Cigarettes     Start date: 1/8/1986    Smokeless tobacco: Never   Substance and Sexual Activity    Alcohol use: Yes     Comment: daily drinker--alcohol abuse    Drug use: Not Currently     Types: Marijuana    Sexual activity: Yes     Birth control/protection: None     Review of Systems   Constitutional:  Negative for chills and fever.   HENT:  Negative for ear discharge and ear pain.    Eyes:  Negative for pain and discharge.   Respiratory:  Negative for cough  and shortness of breath.    Cardiovascular:  Negative for chest pain and leg swelling.   Gastrointestinal:  Negative for nausea and vomiting.   Endocrine: Negative for cold intolerance and heat intolerance.   Genitourinary:  Negative for difficulty urinating and dysuria.   Musculoskeletal:  Negative for joint swelling and myalgias.   Skin:  Negative for rash and wound.   Neurological:  Negative for dizziness and headaches.   Psychiatric/Behavioral:  Negative for agitation and confusion.      Objective:     Vital Signs (Most Recent):  Temp: 97.7 °F (36.5 °C) (06/13/24 1200)  Pulse: 98 (06/13/24 1200)  Resp: 16 (06/13/24 1200)  BP: 114/76 (06/13/24 1200)  SpO2: 99 % (06/12/24 1700) Vital Signs (24h Range):  Temp:  [97 °F (36.1 °C)-97.7 °F (36.5 °C)] 97.7 °F (36.5 °C)  Pulse:  [] 98  Resp:  [16-18] 16  SpO2:  [99 %] 99 %  BP: (114-136)/(64-85) 114/76     Weight: 84.4 kg (186 lb 1.1 oz)  Body mass index is 25.24 kg/m².     Physical Exam  Constitutional:       General: He is not in acute distress.  HENT:      Head: Normocephalic and atraumatic.   Eyes:      General:         Right eye: No discharge.         Left eye: No discharge.   Cardiovascular:      Rate and Rhythm: Normal rate and regular rhythm.   Pulmonary:      Effort: Pulmonary effort is normal.      Breath sounds: Normal breath sounds.   Abdominal:      General: There is no distension.      Tenderness: There is no abdominal tenderness.   Musculoskeletal:         General: No swelling or tenderness.      Cervical back: Neck supple. No tenderness.   Skin:     General: Skin is warm and dry.   Neurological:      General: No focal deficit present.      Mental Status: He is alert and oriented to person, place, and time.      Comments: CN II: Visual Fields full to confrontation  CN III, IV , VI PERRL   CN III: no palsy   Nystagmus: none  Diplopia: none  Ophthalmoparesis: none  CN V Facial sensation intact  CN VII facial expression full, symmetric  CN VIII  normal  CN IX normal  CN X normal  CN XI normal  CN XII normal          Psychiatric:         Mood and Affect: Mood normal.         Behavior: Behavior normal.          Significant Labs: UPT  No results found for this or any previous visit.  U/A  No results found for this or any previous visit.  UDS  Results for orders placed or performed during the hospital encounter of 06/06/24   Drug screen panel, emergency   Result Value Ref Range    Benzodiazepines Presumptive Positive (A) Negative    Methadone metabolites Negative Negative    Cocaine (Metab.) Negative Negative    Opiate Scrn, Ur Negative Negative    Barbiturate Screen, Ur Negative Negative    Amphetamine Screen, Ur Negative Negative    THC Negative Negative    Phencyclidine Negative Negative    Creatinine, Urine 227.4 23.0 - 375.0 mg/dL    Toxicology Information SEE COMMENT      CBC  Results for orders placed or performed during the hospital encounter of 06/06/24   CBC auto differential   Result Value Ref Range    WBC 5.94 3.90 - 12.70 K/uL    RBC 4.58 (L) 4.60 - 6.20 M/uL    Hemoglobin 14.5 14.0 - 18.0 g/dL    Hematocrit 42.7 40.0 - 54.0 %    MCV 93 82 - 98 fL    MCH 31.7 (H) 27.0 - 31.0 pg    MCHC 34.0 32.0 - 36.0 g/dL    RDW 15.5 (H) 11.5 - 14.5 %    Platelets 166 150 - 450 K/uL    MPV 10.1 9.2 - 12.9 fL    Immature Granulocytes 0.2 0.0 - 0.5 %    Gran # (ANC) 4.3 1.8 - 7.7 K/uL    Immature Grans (Abs) 0.01 0.00 - 0.04 K/uL    Lymph # 1.1 1.0 - 4.8 K/uL    Mono # 0.4 0.3 - 1.0 K/uL    Eos # 0.1 0.0 - 0.5 K/uL    Baso # 0.03 0.00 - 0.20 K/uL    nRBC 0 0 /100 WBC    Gran % 72.0 38.0 - 73.0 %    Lymph % 18.2 18.0 - 48.0 %    Mono % 7.4 4.0 - 15.0 %    Eosinophil % 1.7 0.0 - 8.0 %    Basophil % 0.5 0.0 - 1.9 %    Differential Method Automated      CMP  Results for orders placed or performed during the hospital encounter of 06/06/24   Comprehensive metabolic panel   Result Value Ref Range    Sodium 139 136 - 145 mmol/L    Potassium 3.4 (L) 3.5 - 5.1 mmol/L     Chloride 107 95 - 110 mmol/L    CO2 21 (L) 23 - 29 mmol/L    Glucose 86 70 - 110 mg/dL    BUN 11 6 - 20 mg/dL    Creatinine 0.8 0.5 - 1.4 mg/dL    Calcium 9.7 8.7 - 10.5 mg/dL    Total Protein 6.2 6.0 - 8.4 g/dL    Albumin 3.5 3.5 - 5.2 g/dL    Total Bilirubin 0.6 0.1 - 1.0 mg/dL    Alkaline Phosphatase 65 55 - 135 U/L    AST 25 10 - 40 U/L    ALT 27 10 - 44 U/L    eGFR >60 >60 mL/min/1.73 m^2    Anion Gap 11 8 - 16 mmol/L     TSH  Results for orders placed or performed during the hospital encounter of 01/15/24   TSH   Result Value Ref Range    TSH 0.926 0.400 - 4.000 uIU/mL     ETOH  Results for orders placed or performed during the hospital encounter of 06/06/24   Ethanol   Result Value Ref Range    Alcohol, Serum <10 <10 mg/dL     Salicylate  No results found for this or any previous visit.  Acetaminophen  Results for orders placed or performed during the hospital encounter of 01/15/24   Acetaminophen level   Result Value Ref Range    Acetaminophen (Tylenol), Serum <3.0 (L) 10.0 - 20.0 ug/mL             Significant Imaging: none  Assessment/Plan:     Hypertension  Chronic, controlled. Latest blood pressure and vitals reviewed-     Temp:  [97 °F (36.1 °C)-97.7 °F (36.5 °C)]   Pulse:  []   Resp:  [16-18]   BP: (114-136)/(64-85)   SpO2:  [99 %] .   Home meds for hypertension were reviewed and noted below.   Hypertension Medications               lisinopriL 10 MG tablet Take 1 tablet (10 mg total) by mouth once daily.            While in the hospital, will manage blood pressure as follows; Continue home antihypertensive regimen    Will utilize p.r.n. blood pressure medication only if patient's blood pressure greater than 140/90 and he develops symptoms such as worsening chest pain or shortness of breath.    Alcohol withdrawal  Defer to psych       Stage 2 moderate COPD by GOLD classification  Prn inhaler       VTE Risk Mitigation (From admission, onward)      None                Thank you for your consult. I  will sign off. Please contact us if you have any additional questions.    Gloria Anguiano PA-C  Department of [facility]   [facility]

## 2024-06-14 LAB
ALBUMIN SERPL BCP-MCNC: 3.5 G/DL (ref 3.5–5.2)
ALP SERPL-CCNC: 62 U/L (ref 55–135)
ALT SERPL W/O P-5'-P-CCNC: 25 U/L (ref 10–44)
ANION GAP SERPL CALC-SCNC: 10 MMOL/L (ref 8–16)
AST SERPL-CCNC: 16 U/L (ref 10–40)
BASOPHILS # BLD AUTO: 0.04 K/UL (ref 0–0.2)
BASOPHILS NFR BLD: 0.8 % (ref 0–1.9)
BILIRUB SERPL-MCNC: 0.4 MG/DL (ref 0.1–1)
BUN SERPL-MCNC: 20 MG/DL (ref 6–20)
CALCIUM SERPL-MCNC: 9.6 MG/DL (ref 8.7–10.5)
CHLORIDE SERPL-SCNC: 102 MMOL/L (ref 95–110)
CO2 SERPL-SCNC: 24 MMOL/L (ref 23–29)
CREAT SERPL-MCNC: 1 MG/DL (ref 0.5–1.4)
DIFFERENTIAL METHOD BLD: ABNORMAL
EOSINOPHIL # BLD AUTO: 0.1 K/UL (ref 0–0.5)
EOSINOPHIL NFR BLD: 1 % (ref 0–8)
ERYTHROCYTE [DISTWIDTH] IN BLOOD BY AUTOMATED COUNT: 14.5 % (ref 11.5–14.5)
EST. GFR  (NO RACE VARIABLE): >60 ML/MIN/1.73 M^2
GLUCOSE SERPL-MCNC: 122 MG/DL (ref 70–110)
HCT VFR BLD AUTO: 37.7 % (ref 40–54)
HGB BLD-MCNC: 12.6 G/DL (ref 14–18)
IMM GRANULOCYTES # BLD AUTO: 0.01 K/UL (ref 0–0.04)
IMM GRANULOCYTES NFR BLD AUTO: 0.2 % (ref 0–0.5)
LIPASE SERPL-CCNC: 18 U/L (ref 4–60)
LYMPHOCYTES # BLD AUTO: 1.3 K/UL (ref 1–4.8)
LYMPHOCYTES NFR BLD: 27.5 % (ref 18–48)
MAGNESIUM SERPL-MCNC: 1.5 MG/DL (ref 1.6–2.6)
MCH RBC QN AUTO: 31.3 PG (ref 27–31)
MCHC RBC AUTO-ENTMCNC: 33.4 G/DL (ref 32–36)
MCV RBC AUTO: 94 FL (ref 82–98)
MONOCYTES # BLD AUTO: 1 K/UL (ref 0.3–1)
MONOCYTES NFR BLD: 19.9 % (ref 4–15)
NEUTROPHILS # BLD AUTO: 2.5 K/UL (ref 1.8–7.7)
NEUTROPHILS NFR BLD: 50.6 % (ref 38–73)
NRBC BLD-RTO: 0 /100 WBC
PLATELET # BLD AUTO: 272 K/UL (ref 150–450)
PMV BLD AUTO: 9.8 FL (ref 9.2–12.9)
POTASSIUM SERPL-SCNC: 3.6 MMOL/L (ref 3.5–5.1)
PROT SERPL-MCNC: 6.5 G/DL (ref 6–8.4)
RBC # BLD AUTO: 4.03 M/UL (ref 4.6–6.2)
SODIUM SERPL-SCNC: 136 MMOL/L (ref 136–145)
WBC # BLD AUTO: 4.87 K/UL (ref 3.9–12.7)

## 2024-06-14 PROCEDURE — 25000003 PHARM REV CODE 250: Performed by: PHYSICIAN ASSISTANT

## 2024-06-14 PROCEDURE — 99900035 HC TECH TIME PER 15 MIN (STAT)

## 2024-06-14 PROCEDURE — 36415 COLL VENOUS BLD VENIPUNCTURE: CPT | Performed by: PHYSICIAN ASSISTANT

## 2024-06-14 PROCEDURE — 11400000 HC PSYCH PRIVATE ROOM

## 2024-06-14 PROCEDURE — S4991 NICOTINE PATCH NONLEGEND: HCPCS | Performed by: STUDENT IN AN ORGANIZED HEALTH CARE EDUCATION/TRAINING PROGRAM

## 2024-06-14 PROCEDURE — 83735 ASSAY OF MAGNESIUM: CPT | Performed by: PHYSICIAN ASSISTANT

## 2024-06-14 PROCEDURE — 99233 SBSQ HOSP IP/OBS HIGH 50: CPT | Mod: AF,HB,, | Performed by: STUDENT IN AN ORGANIZED HEALTH CARE EDUCATION/TRAINING PROGRAM

## 2024-06-14 PROCEDURE — 83690 ASSAY OF LIPASE: CPT | Performed by: PHYSICIAN ASSISTANT

## 2024-06-14 PROCEDURE — 80053 COMPREHEN METABOLIC PANEL: CPT | Performed by: PHYSICIAN ASSISTANT

## 2024-06-14 PROCEDURE — 85025 COMPLETE CBC W/AUTO DIFF WBC: CPT | Performed by: PHYSICIAN ASSISTANT

## 2024-06-14 PROCEDURE — 25000003 PHARM REV CODE 250: Performed by: STUDENT IN AN ORGANIZED HEALTH CARE EDUCATION/TRAINING PROGRAM

## 2024-06-14 RX ORDER — SIMETHICONE 80 MG
1 TABLET,CHEWABLE ORAL 3 TIMES DAILY PRN
Status: DISCONTINUED | OUTPATIENT
Start: 2024-06-14 | End: 2024-06-20 | Stop reason: HOSPADM

## 2024-06-14 RX ORDER — ARIPIPRAZOLE 10 MG/1
10 TABLET ORAL DAILY
Status: DISCONTINUED | OUTPATIENT
Start: 2024-06-15 | End: 2024-06-17

## 2024-06-14 RX ORDER — LANOLIN ALCOHOL/MO/W.PET/CERES
400 CREAM (GRAM) TOPICAL 2 TIMES DAILY
Status: COMPLETED | OUTPATIENT
Start: 2024-06-14 | End: 2024-06-15

## 2024-06-14 RX ORDER — SUCRALFATE 1 G/10ML
1 SUSPENSION ORAL
Status: DISCONTINUED | OUTPATIENT
Start: 2024-06-14 | End: 2024-06-17

## 2024-06-14 RX ADMIN — SIMETHICONE 80 MG: 80 TABLET, CHEWABLE ORAL at 04:06

## 2024-06-14 RX ADMIN — LISINOPRIL 10 MG: 10 TABLET ORAL at 08:06

## 2024-06-14 RX ADMIN — ARIPIPRAZOLE 7 MG: 5 TABLET ORAL at 08:06

## 2024-06-14 RX ADMIN — DIAZEPAM 10 MG: 10 TABLET ORAL at 08:06

## 2024-06-14 RX ADMIN — SUCRALFATE 1 G: 1 SUSPENSION ORAL at 08:06

## 2024-06-14 RX ADMIN — Medication 6 MG: at 08:06

## 2024-06-14 RX ADMIN — LORAZEPAM 1 MG: 1 TABLET ORAL at 04:06

## 2024-06-14 RX ADMIN — ESCITALOPRAM 20 MG: 20 TABLET, FILM COATED ORAL at 08:06

## 2024-06-14 RX ADMIN — LOPERAMIDE HYDROCHLORIDE 2 MG: 2 CAPSULE ORAL at 02:06

## 2024-06-14 RX ADMIN — NICOTINE 1 PATCH: 21 PATCH, EXTENDED RELEASE TRANSDERMAL at 08:06

## 2024-06-14 RX ADMIN — HYDROXYZINE PAMOATE 50 MG: 50 CAPSULE ORAL at 08:06

## 2024-06-14 RX ADMIN — Medication 400 MG: at 08:06

## 2024-06-14 RX ADMIN — FOLIC ACID 1 MG: 1 TABLET ORAL at 08:06

## 2024-06-14 RX ADMIN — Medication 100 MG: at 08:06

## 2024-06-14 RX ADMIN — SUCRALFATE 1 G: 1 SUSPENSION ORAL at 04:06

## 2024-06-14 RX ADMIN — DIAZEPAM 10 MG: 10 TABLET ORAL at 02:06

## 2024-06-14 RX ADMIN — PANTOPRAZOLE SODIUM 40 MG: 40 TABLET, DELAYED RELEASE ORAL at 08:06

## 2024-06-14 RX ADMIN — ONDANSETRON 4 MG: 4 TABLET, ORALLY DISINTEGRATING ORAL at 08:06

## 2024-06-14 RX ADMIN — THERA TABS 1 TABLET: TAB at 08:06

## 2024-06-14 NOTE — PLAN OF CARE
Pt still reports feeling nauseous and received zofran at 0810.  Reports not being able to tolerate solid foods.  Pt put on liquid diet.  Given powerade with meds this am.  New med consult ordered for pt.  Pt denies any S/I or H/I at this time.  Still having detox symptoms.  Hospitalist notified.  Will continue to monitor.

## 2024-06-14 NOTE — NURSING
Pt sleeping at this time, slept 7.5 hrs with 4 awakenings. NAD. Resp even and unlabored.Pathways clear,bed in low position. Q 15 min safety check ongoing.All precautions maintained.

## 2024-06-14 NOTE — PROGRESS NOTES
06/14/24 1415   [facility] Group Therapy   Group Name Therapeutic Recreation   Specific Interventions Cognitive Stimulation Training   Participation Level Appropriate   Participation Quality Cooperative;Social   Insight/Motivation Applies New Skills;Improved   Affect/Mood Display Appropriate   Cognition Alert   Psychomotor WNL     Patient presents calm, cooperative, in bed resting when approached, reports he didn't feel well & complains of feeling nauseated. Patient remained alert and involved during a problem solving and decision making skilled activity.

## 2024-06-14 NOTE — NURSING
Ativan 1 mg po given for tremors and /89.  Emptied light brown vomit from cup by his bedside twice so far this shift.

## 2024-06-14 NOTE — NURSING
Pt given prn simethicone at this time, will monitor for effective response.  Instructed pt to not flush his next BM and to inform nursing staff to assess appearance of stool.  Pt verbalized understanding.  Will continue to monitor.

## 2024-06-14 NOTE — PROGRESS NOTES
PSYCHIATRY DAILY INPATIENT PROGRESS NOTE  SUBSEQUENT HOSPITAL VISIT    ENCOUNTER DATE: 6/14/2024  SITE: [facility]    DATE OF ADMISSION: 6/11/2024  3:58 PM  LENGTH OF STAY: 3 days      CHIEF COMPLAINT   Maicol Millan III is a 53 y.o. male, seen during daily horne rounds on the inpatient unit.  Maicol Millan III presented with the chief complaint of depression, anxiety, and substance problems      The patient was seen and examined. The chart was reviewed.     Reviewed notes from Rns and CTRS and labs from the last 24 hours.    The patient's case was discussed with the treatment team/care providers today including Rns    Staff reports no behavioral or management issues.     The patient has been compliant with treatment.      Subjective 06/14/2024    Vomiting and diarrhea overnight. W/d symptoms continuing. Pt is not able to eat but is drinking boosts.    No s/s of DT, no seizures. Received PRN ativan for ETOH w/d last night.    Pt states he is not feeling better.    The patient denies any side effects to medications.    At this time symptoms remains severe, functionally and behaviorally impairing and pt remains in need of continued acute inpatient hospitalization for both safety and stabilization.           Interim/overnight events per report/notes:  Per RN:  Ativan 1 mg po given for tremors and /89.  Emptied light brown vomit from cup by his bedside twice so far this shift.        Psychiatric ROS (observed, reported, or endorsed/denied):  Depressed mood - Continuing  Interest/pleasure/anhedonia: fluctuating  Guilt/hopelessness/worthlessness - Continuing  Changes in Sleep - less  Changes in Appetite - less  Changes in Concentration - fluctuating  Changes in Energy - fluctuating  PMA/R- fluctuating  Suicidal- active/passive ideations - fluctuating  Homicidal ideations: active/passive ideations - No    Hallucinations - No  Delusions - No  Disorganized behavior - No  Disorganized speech -  No  Negative symptoms - No    Elevated mood - No  Decreased need for sleep - No  Grandiosity - No  Racing thoughts - No  Impulsivity - No  Irritability- No  Increased energy - No  Distractibility - No  Increase in goal-directed activity or PMA- No    Symptoms of CAROLA - fluctuating  Symptoms of Panic Disorder- fluctuating  Symptoms of PTSD - fluctuating        Overall progress: slow      Medical ROS  Review of Systems   Constitutional:  Negative for chills and fever.   HENT:  Negative for hearing loss.    Eyes:  Negative for blurred vision and double vision.   Respiratory:  Negative for shortness of breath.    Cardiovascular:  Negative for chest pain and palpitations.   Gastrointestinal:  Negative for constipation, diarrhea, nausea and vomiting.   Genitourinary:  Negative for dysuria and urgency.   Musculoskeletal:  Negative for joint pain.   Skin:  Negative for rash.   Neurological:  Negative for dizziness and headaches.   Endo/Heme/Allergies:  Negative for environmental allergies.         PAST MEDICAL HISTORY   Past Medical History:   Diagnosis Date    Addiction to drug     Alcohol abuse     last drink 9/06/22    Anxiety     Bipolar disorder     COPD (chronic obstructive pulmonary disease)     Depression     Disc disorder     Fatigue     Headache     History of psychiatric hospitalization     Hx of psychiatric care     Hypertension 6/13/2024    Panic disorder     Psychiatric problem     PTSD (post-traumatic stress disorder)     Sleep difficulties     Suicide attempt     Therapy     Withdrawal symptoms, alcohol            PSYCHOTROPIC MEDICATIONS   Scheduled Meds:   [START ON 6/15/2024] ARIPiprazole  10 mg Oral Daily    diazePAM  10 mg Oral TID    EScitalopram oxalate  20 mg Oral Daily    folic acid  1 mg Oral Daily    lisinopriL  10 mg Oral Daily    multivitamin  1 tablet Oral Daily    nicotine  1 patch Transdermal Daily    pantoprazole  40 mg Oral Daily    sucralfate  1 g Oral QID (AC & HS)    thiamine  100 mg Oral  Daily     Continuous Infusions:  PRN Meds:.  Current Facility-Administered Medications:     acetaminophen, 650 mg, Oral, Q6H PRN    albuterol, 2 puff, Inhalation, Q6H PRN **AND** MDI Q6H PRN, , , Q6H PRN    hydrOXYzine pamoate, 50 mg, Oral, Q6H PRN    loperamide, 2 mg, Oral, PRN    LORazepam, 1 mg, Oral, Q6H PRN    melatonin, 6 mg, Oral, Nightly PRN    ondansetron, 4 mg, Oral, Q8H PRN        EXAMINATION    VITALS   Vitals:    06/13/24 2030 06/14/24 0103 06/14/24 0407 06/14/24 0722   BP: 132/67 124/73 (!) 164/89 105/70   BP Location:  Right arm Left arm Left arm   Patient Position:  Lying Lying Lying   Pulse: 108 94 99 97   Resp: 18 18 18 18   Temp: 97.7 °F (36.5 °C) 97.2 °F (36.2 °C) 97.3 °F (36.3 °C) 97.3 °F (36.3 °C)   TempSrc: Temporal   Tympanic   SpO2: 97%  98% 96%   Weight:       Height:           Body mass index is 25.24 kg/m².        CONSTITUTIONAL  General Appearance: unremarkable, age appropriate    MUSCULOSKELETAL  Muscle Strength and Tone:no tic, less tremor  Abnormal Involuntary Movements: No  Gait and Station: non-ataxic    PSYCHIATRIC   Level of Consciousness: awake and alert   Orientation: person, place, and situation  Grooming: Hospital garb and Well groomed  Psychomotor Behavior: normal, cooperative  Speech: normal tone, normal rate, normal pitch, normal volume  Language: grossly intact  Mood: depressed  Affect: Consistent with mood  Thought Process: linear, logical  Associations: intact   Thought Content: less SI, denies HI, and no delusions  Perceptions: denies AH, denies  VH, no TH, and not RIS  Memory: Able to recall past events, Remote intact, and Recent intact  Attention:Attends to interview without distraction  Fund of Knowledge: Aware of current events and Vocabulary appropriate   Estimate if Intelligence:  Average based on work/education history, vocabulary and mental status exam  Insight: has awareness of illness  Judgment: behavior is adequate to circumstances        DIAGNOSTIC TESTING    Laboratory Results  No results found for this or any previous visit (from the past 24 hour(s)).        MEDICAL DECISION MAKING          ASSESSMENT         MDD, severe  Unspecified psychosis  SI  Anxiety  PTSD  Alcohol abuse        PROBLEM LIST AND MANAGEMENT PLANS           MDD  - continue lexapro 20 mg pO qd  - resume abilify 5 mg PO qd  -increase to 7 mg PO qd -increase to 10 mg PO qd tomorrow  - pt counseled  - follow up with outpatient mental health providers after discharge for medication management and psychotherapy        Psychosis  - r/o SIPD  - Abilify as above  - pt counseled  - follow up with outpatient mental health providers after discharge for medication management and psychotherapy     Suicidal ideations  - continue psychiatric hospitalization  - provide psychotherapeutic interventions and medication management     Anxiety  - started/continue hydroxyzine 50 mg PO q 6 hours PRN  - lexapro as above  - pt counseled  - follow up with outpatient mental health providers after discharge for medication management and psychotherapy     PTSD   - lexapro as above  - pt counseled  - follow up with outpatient mental health providers after discharge for medication management and psychotherapy     Alcohol abuse  Alcohol Brief Intervention     Discussed with patient that there is concern he/she is drinking at unhealthy levels known to increase his/her risk of alcohol-related health problems - Yes  Discussed general feedback on health risks associated with drinking and/or given personalized feedback - Yes  Patient was advised to abstain from alcohol use - Yes     - change valium 10 mg PO q 4 hours to 20 mg PO TID  -decrease to 20 mg/10/10 mg - decrease to 10 mg PO TID today  - ativan 1 mg PO q 6 hours PRN for breakthrough w/d parameters  - consider vivitrol prior to discharge  - consider gabapentin                Discussed diagnosis, risks and benefits of proposed treatment vs alternative treatments vs no treatment,  potential side effects of these treatments and the inherent unpredictability of treatment. The patient expresses understanding of the above and displays the capacity to agree with this treatment given said understanding. Patient also agrees that, currently, the benefits outweigh the risks and would like to pursue/continue treatment at this time.    Any medications being used off-label were discussed with the patient inclusive of the evidence base for the use of the medications and consent was obtained for the off-label use of the medication.       DISCHARGE PLANNING  Expected Disposition Plan: Home or Self Care      NEED FOR CONTINUED HOSPITALIZATION  Psychiatric illness continues to pose a potential threat to life or bodily function, of self or others, thereby requiring the need for continued inpatient psychiatric hospitalization: Yes, due to: danger to self, as evidenced by:  Ongoing concerns with SI.    Protective inpatient pyschiatric hospitalization required while a safe disposition plan is enacted: Yes    Patient stabilized and ready for discharge from inpatient psychiatric unit: No        STAFF:   Adalberto Oneal III, MD  Psychiatry

## 2024-06-14 NOTE — PROGRESS NOTES
06/14/24 0950   [facility] Group Therapy   Group Name Other  (1:1 attempt)   Participation Level None     Patient isolating in room, resting in bed on top of his covers, presents depressed, reports a not good mood, complains of feeling nauseated & hot/cold sweats.

## 2024-06-14 NOTE — PLAN OF CARE
"Denies suicidal thoughts at present.  Voiced worry about his stomach.  "I need an upper and lower GI workup.  Something ain't right".  Complained of diarrhea, nausea, pain all over, anxiety, and trouble sleeping.  See MAR.  Blunted affect.  Isolating in room.  Has gatorade in pitcher and is drinking it with reminding from staff.  Refused snack.  VS WNL.  Noted walking steady back and forth to bathroom.  Modified VC maintained.    "

## 2024-06-14 NOTE — CARE UPDATE
Patient with nausea, vomiting and diarrhea after eating last night.  Abdominal exam unremarkable. Likely gastroenteritis.  If symptoms continue consider CTAP.      Gloria Anguiano

## 2024-06-14 NOTE — PSYCH
Pt still having nausea/diarrhea.  Xray on unit to do abdominal xray, pt compliant.  Appears in discomfort and is tremulous.  Hospitalist notified.  Pt reports having loose BM, given prn immodium.

## 2024-06-15 PROCEDURE — 99900035 HC TECH TIME PER 15 MIN (STAT)

## 2024-06-15 PROCEDURE — S4991 NICOTINE PATCH NONLEGEND: HCPCS | Performed by: STUDENT IN AN ORGANIZED HEALTH CARE EDUCATION/TRAINING PROGRAM

## 2024-06-15 PROCEDURE — 25000003 PHARM REV CODE 250: Performed by: PHYSICIAN ASSISTANT

## 2024-06-15 PROCEDURE — 25000003 PHARM REV CODE 250: Performed by: STUDENT IN AN ORGANIZED HEALTH CARE EDUCATION/TRAINING PROGRAM

## 2024-06-15 PROCEDURE — 99232 SBSQ HOSP IP/OBS MODERATE 35: CPT | Mod: AF,HB,, | Performed by: PSYCHIATRY & NEUROLOGY

## 2024-06-15 PROCEDURE — 11400000 HC PSYCH PRIVATE ROOM

## 2024-06-15 RX ADMIN — SUCRALFATE 1 G: 1 SUSPENSION ORAL at 08:06

## 2024-06-15 RX ADMIN — DIAZEPAM 10 MG: 10 TABLET ORAL at 03:06

## 2024-06-15 RX ADMIN — THERA TABS 1 TABLET: TAB at 08:06

## 2024-06-15 RX ADMIN — ARIPIPRAZOLE 10 MG: 10 TABLET ORAL at 08:06

## 2024-06-15 RX ADMIN — NICOTINE 1 PATCH: 21 PATCH, EXTENDED RELEASE TRANSDERMAL at 08:06

## 2024-06-15 RX ADMIN — Medication 6 MG: at 08:06

## 2024-06-15 RX ADMIN — SUCRALFATE 1 G: 1 SUSPENSION ORAL at 11:06

## 2024-06-15 RX ADMIN — Medication 100 MG: at 08:06

## 2024-06-15 RX ADMIN — SIMETHICONE 80 MG: 80 TABLET, CHEWABLE ORAL at 03:06

## 2024-06-15 RX ADMIN — SUCRALFATE 1 G: 1 SUSPENSION ORAL at 05:06

## 2024-06-15 RX ADMIN — DIAZEPAM 10 MG: 10 TABLET ORAL at 08:06

## 2024-06-15 RX ADMIN — FOLIC ACID 1 MG: 1 TABLET ORAL at 08:06

## 2024-06-15 RX ADMIN — HYDROXYZINE PAMOATE 50 MG: 50 CAPSULE ORAL at 08:06

## 2024-06-15 RX ADMIN — ESCITALOPRAM 20 MG: 20 TABLET, FILM COATED ORAL at 08:06

## 2024-06-15 RX ADMIN — LOPERAMIDE HYDROCHLORIDE 2 MG: 2 CAPSULE ORAL at 06:06

## 2024-06-15 RX ADMIN — SIMETHICONE 80 MG: 80 TABLET, CHEWABLE ORAL at 05:06

## 2024-06-15 RX ADMIN — ONDANSETRON 4 MG: 4 TABLET, ORALLY DISINTEGRATING ORAL at 07:06

## 2024-06-15 RX ADMIN — SUCRALFATE 1 G: 1 SUSPENSION ORAL at 04:06

## 2024-06-15 RX ADMIN — Medication 400 MG: at 08:06

## 2024-06-15 RX ADMIN — PANTOPRAZOLE SODIUM 40 MG: 40 TABLET, DELAYED RELEASE ORAL at 08:06

## 2024-06-15 RX ADMIN — LISINOPRIL 10 MG: 10 TABLET ORAL at 08:06

## 2024-06-15 RX ADMIN — LOPERAMIDE HYDROCHLORIDE 2 MG: 2 CAPSULE ORAL at 12:06

## 2024-06-15 NOTE — PROGRESS NOTES
PSYCHIATRY DAILY INPATIENT PROGRESS NOTE  SUBSEQUENT HOSPITAL VISIT    ENCOUNTER DATE: 6/15/2024  SITE: [facility]    DATE OF ADMISSION: 6/11/2024  3:58 PM  LENGTH OF STAY: 4 days      CHIEF COMPLAINT   Maicol Millan III is a 53 y.o. male, seen during daily horne rounds on the inpatient unit.  Maicol Millan III presented with the chief complaint of depression, anxiety, and substance problems      The patient was seen and examined. The chart was reviewed.     Reviewed notes from Rns and CTRS and labs from the last 24 hours.    The patient's case was discussed with the treatment team/care providers today including Rns    Staff reports no behavioral or management issues.     The patient has been compliant with treatment.      Subjective 06/15/2024    Vomiting has improved, however pt reports continued diarrhea overnight. W/d symptoms continuing. Pt is asking to eat regular food today, while continuing boost. He insists that his N/v is not related to alcohol abuse. He describes episodes of dizziness, as well as episodes during which he feels like he has been hit in the head and is somewhat unsteady in his gate. States that this occurs independent of drinking EtOH. Has not yet seen Neuro or ENT.     No s/s of DT, no seizures. Required PRN for WD yesterday. VS stable today, though pulse is somewhat elevated. Tremor remains prominent, L>R.     The patient denies any side effects to medications.    At this time symptoms remains severe, functionally and behaviorally impairing and pt remains in need of continued acute inpatient hospitalization for both safety and stabilization.           Interim/overnight events per report/notes:  Per RN:  Ativan 1 mg po given for tremors and /89.  Emptied light brown vomit from cup by his bedside twice so far this shift.        Psychiatric ROS (observed, reported, or endorsed/denied):  Depressed mood - Continuing  Interest/pleasure/anhedonia:  fluctuating  Guilt/hopelessness/worthlessness - Continuing  Changes in Sleep - less  Changes in Appetite - less  Changes in Concentration - fluctuating  Changes in Energy - fluctuating  PMA/R- fluctuating  Suicidal- active/passive ideations - fluctuating  Homicidal ideations: active/passive ideations - No    Hallucinations - No  Delusions - No  Disorganized behavior - No  Disorganized speech - No  Negative symptoms - No    Elevated mood - No  Decreased need for sleep - No  Grandiosity - No  Racing thoughts - No  Impulsivity - No  Irritability- No  Increased energy - No  Distractibility - No  Increase in goal-directed activity or PMA- No    Symptoms of CAROLA - fluctuating  Symptoms of Panic Disorder- fluctuating  Symptoms of PTSD - fluctuating        Overall progress: slow      Medical ROS  Review of Systems   Constitutional:  Negative for chills and fever.   HENT:  Negative for hearing loss.    Eyes:  Negative for blurred vision and double vision.   Respiratory:  Negative for shortness of breath.    Cardiovascular:  Negative for chest pain and palpitations.   Gastrointestinal:  Negative for constipation, diarrhea, nausea and vomiting.   Genitourinary:  Negative for dysuria and urgency.   Musculoskeletal:  Negative for joint pain.   Skin:  Negative for rash.   Neurological:  Negative for dizziness and headaches.   Endo/Heme/Allergies:  Negative for environmental allergies.         PAST MEDICAL HISTORY   Past Medical History:   Diagnosis Date    Addiction to drug     Alcohol abuse     last drink 9/06/22    Anxiety     Bipolar disorder     COPD (chronic obstructive pulmonary disease)     Depression     Disc disorder     Fatigue     Headache     History of psychiatric hospitalization     Hx of psychiatric care     Hypertension 6/13/2024    Panic disorder     Psychiatric problem     PTSD (post-traumatic stress disorder)     Sleep difficulties     Suicide attempt     Therapy     Withdrawal symptoms, alcohol             PSYCHOTROPIC MEDICATIONS   Scheduled Meds:   ARIPiprazole  10 mg Oral Daily    diazePAM  10 mg Oral TID    EScitalopram oxalate  20 mg Oral Daily    folic acid  1 mg Oral Daily    lisinopriL  10 mg Oral Daily    multivitamin  1 tablet Oral Daily    nicotine  1 patch Transdermal Daily    pantoprazole  40 mg Oral Daily    sucralfate  1 g Oral QID (AC & HS)    thiamine  100 mg Oral Daily     Continuous Infusions:  PRN Meds:.  Current Facility-Administered Medications:     acetaminophen, 650 mg, Oral, Q6H PRN    albuterol, 2 puff, Inhalation, Q6H PRN **AND** MDI Q6H PRN, , , Q6H PRN    hydrOXYzine pamoate, 50 mg, Oral, Q6H PRN    loperamide, 2 mg, Oral, PRN    LORazepam, 1 mg, Oral, Q6H PRN    melatonin, 6 mg, Oral, Nightly PRN    ondansetron, 4 mg, Oral, Q8H PRN    simethicone, 1 tablet, Oral, TID PRN        EXAMINATION    VITALS   Vitals:    06/14/24 1918 06/14/24 2330 06/15/24 0517 06/15/24 0738   BP: 129/71 107/61 116/73 109/65   BP Location: Left arm Left arm Left arm Right arm   Patient Position: Lying Sitting Sitting Sitting   Pulse: 88 87 92 98   Resp: 18 20 20 18   Temp: 97.3 °F (36.3 °C) 97.3 °F (36.3 °C) 97.2 °F (36.2 °C) 97.1 °F (36.2 °C)   TempSrc: Tympanic   Tympanic   SpO2:    99%   Weight:       Height:           Body mass index is 25.24 kg/m².        CONSTITUTIONAL  General Appearance: unremarkable, age appropriate    MUSCULOSKELETAL  Muscle Strength and Tone:no tic, less tremor  Abnormal Involuntary Movements: No  Gait and Station: non-ataxic    PSYCHIATRIC   Level of Consciousness: awake and alert   Orientation: person, place, and situation  Grooming: Hospital garb and Well groomed  Psychomotor Behavior: normal, cooperative  Speech: normal tone, normal rate, normal pitch, normal volume  Language: grossly intact  Mood: depressed  Affect: Consistent with mood  Thought Process: linear, logical  Associations: intact   Thought Content: less SI, denies HI, and no delusions  Perceptions: denies AH,  denies  VH, no TH, and not RIS  Memory: Able to recall past events, Remote intact, and Recent intact  Attention:Attends to interview without distraction  Fund of Knowledge: Aware of current events and Vocabulary appropriate   Estimate if Intelligence:  Average based on work/education history, vocabulary and mental status exam  Insight: has awareness of illness  Judgment: behavior is adequate to circumstances        DIAGNOSTIC TESTING   Laboratory Results  No results found for this or any previous visit (from the past 24 hour(s)).        MEDICAL DECISION MAKING          ASSESSMENT         MDD, severe  Unspecified psychosis  SI  Anxiety  PTSD  Alcohol abuse        PROBLEM LIST AND MANAGEMENT PLANS           MDD  - continue lexapro 20 mg pO qd  - resume abilify 5 mg PO qd  -increase to 7 mg PO qd -increase to 10 mg PO qd tomorrow  - pt counseled  - follow up with outpatient mental health providers after discharge for medication management and psychotherapy        Psychosis  - r/o SIPD  - Abilify as above  - pt counseled  - follow up with outpatient mental health providers after discharge for medication management and psychotherapy     Suicidal ideations  - continue psychiatric hospitalization  - provide psychotherapeutic interventions and medication management     Anxiety  - started/continue hydroxyzine 50 mg PO q 6 hours PRN  - lexapro as above  - pt counseled  - follow up with outpatient mental health providers after discharge for medication management and psychotherapy     PTSD   - lexapro as above  - pt counseled  - follow up with outpatient mental health providers after discharge for medication management and psychotherapy     Alcohol abuse  Alcohol Brief Intervention     Discussed with patient that there is concern he/she is drinking at unhealthy levels known to increase his/her risk of alcohol-related health problems - Yes  Discussed general feedback on health risks associated with drinking and/or given  personalized feedback - Yes  Patient was advised to abstain from alcohol use - Yes     - change valium 10 mg PO q 4 hours to 20 mg PO TID  -decrease to 20 mg/10/10 mg - decrease to 10 mg PO TID today  - ativan 1 mg PO q 6 hours PRN for breakthrough w/d parameters  - consider vivitrol prior to discharge  - consider gabapentin                Discussed diagnosis, risks and benefits of proposed treatment vs alternative treatments vs no treatment, potential side effects of these treatments and the inherent unpredictability of treatment. The patient expresses understanding of the above and displays the capacity to agree with this treatment given said understanding. Patient also agrees that, currently, the benefits outweigh the risks and would like to pursue/continue treatment at this time.    Any medications being used off-label were discussed with the patient inclusive of the evidence base for the use of the medications and consent was obtained for the off-label use of the medication.       DISCHARGE PLANNING  Expected Disposition Plan: Home or Self Care      NEED FOR CONTINUED HOSPITALIZATION  Psychiatric illness continues to pose a potential threat to life or bodily function, of self or others, thereby requiring the need for continued inpatient psychiatric hospitalization: Yes, due to: danger to self, as evidenced by:  Ongoing concerns with SI.    Protective inpatient pyschiatric hospitalization required while a safe disposition plan is enacted: Yes    Patient stabilized and ready for discharge from inpatient psychiatric unit: No        STAFF:   Ted Shepard MD  Psychiatry

## 2024-06-15 NOTE — PLAN OF CARE
Pt reports feeling better this morning as far as GI symptoms.  States he is wanting to try a regular diet today.  Diet order changed to regular.  Pt denies any S/I or H/I at this time.  Mood improving.  Still undecided on rehab.  Still having some mild hand tremors, more visible when trying to hold an object.  Pt reports he thinks he is over the alcohol withdrawal at this point.  No acute distress apparent at this time, will continue to monitor.

## 2024-06-15 NOTE — PLAN OF CARE
"Denies suicidal thoughts at this time.  Mood "so-so".  Noted walking around unit.  Positive encouragement given for being out of bed.  Affect improving.  Said he's feeling slightly better physically this evening.  Denies nausea at present.  Voiced still not being sure about rehab.  Goals discussed.  Encouraged pt to think of 5 year goal.  Encouraged increased fluids and regular meals.  VS stable.    "

## 2024-06-16 PROCEDURE — 99900035 HC TECH TIME PER 15 MIN (STAT)

## 2024-06-16 PROCEDURE — 11400000 HC PSYCH PRIVATE ROOM

## 2024-06-16 PROCEDURE — S4991 NICOTINE PATCH NONLEGEND: HCPCS | Performed by: STUDENT IN AN ORGANIZED HEALTH CARE EDUCATION/TRAINING PROGRAM

## 2024-06-16 PROCEDURE — 99232 SBSQ HOSP IP/OBS MODERATE 35: CPT | Mod: AF,HB,, | Performed by: PSYCHIATRY & NEUROLOGY

## 2024-06-16 PROCEDURE — 25000003 PHARM REV CODE 250: Performed by: STUDENT IN AN ORGANIZED HEALTH CARE EDUCATION/TRAINING PROGRAM

## 2024-06-16 PROCEDURE — 25000003 PHARM REV CODE 250: Performed by: PHYSICIAN ASSISTANT

## 2024-06-16 RX ADMIN — LOPERAMIDE HYDROCHLORIDE 2 MG: 2 CAPSULE ORAL at 07:06

## 2024-06-16 RX ADMIN — Medication 6 MG: at 08:06

## 2024-06-16 RX ADMIN — SIMETHICONE 80 MG: 80 TABLET, CHEWABLE ORAL at 06:06

## 2024-06-16 RX ADMIN — ONDANSETRON 4 MG: 4 TABLET, ORALLY DISINTEGRATING ORAL at 07:06

## 2024-06-16 RX ADMIN — LISINOPRIL 10 MG: 10 TABLET ORAL at 08:06

## 2024-06-16 RX ADMIN — ARIPIPRAZOLE 10 MG: 10 TABLET ORAL at 08:06

## 2024-06-16 RX ADMIN — DIAZEPAM 10 MG: 10 TABLET ORAL at 02:06

## 2024-06-16 RX ADMIN — SUCRALFATE 1 G: 1 SUSPENSION ORAL at 04:06

## 2024-06-16 RX ADMIN — ACETAMINOPHEN 650 MG: 325 TABLET ORAL at 03:06

## 2024-06-16 RX ADMIN — DIAZEPAM 10 MG: 10 TABLET ORAL at 08:06

## 2024-06-16 RX ADMIN — HYDROXYZINE PAMOATE 50 MG: 50 CAPSULE ORAL at 08:06

## 2024-06-16 RX ADMIN — NICOTINE 1 PATCH: 21 PATCH, EXTENDED RELEASE TRANSDERMAL at 08:06

## 2024-06-16 RX ADMIN — THERA TABS 1 TABLET: TAB at 08:06

## 2024-06-16 RX ADMIN — ESCITALOPRAM 20 MG: 20 TABLET, FILM COATED ORAL at 08:06

## 2024-06-16 RX ADMIN — SUCRALFATE 1 G: 1 SUSPENSION ORAL at 05:06

## 2024-06-16 RX ADMIN — SUCRALFATE 1 G: 1 SUSPENSION ORAL at 11:06

## 2024-06-16 RX ADMIN — FOLIC ACID 1 MG: 1 TABLET ORAL at 08:06

## 2024-06-16 RX ADMIN — Medication 100 MG: at 08:06

## 2024-06-16 RX ADMIN — PANTOPRAZOLE SODIUM 40 MG: 40 TABLET, DELAYED RELEASE ORAL at 08:06

## 2024-06-16 RX ADMIN — LOPERAMIDE HYDROCHLORIDE 2 MG: 2 CAPSULE ORAL at 06:06

## 2024-06-16 NOTE — NURSING
Rested intermittently with closed eyes and even resp for 7 hours.  Modified VC and all precautions maintained.  Pathways clear and bed in low position.

## 2024-06-16 NOTE — PROGRESS NOTES
PSYCHIATRY DAILY INPATIENT PROGRESS NOTE  SUBSEQUENT HOSPITAL VISIT    ENCOUNTER DATE: 6/16/2024  SITE: [facility]    DATE OF ADMISSION: 6/11/2024  3:58 PM  LENGTH OF STAY: 5 days      CHIEF COMPLAINT   Maicol Millan III is a 53 y.o. male, seen during daily horne rounds on the inpatient unit.  Maicol Millan III presented with the chief complaint of depression, anxiety, and substance problems      The patient was seen and examined. The chart was reviewed.     Reviewed notes from Rns and CTRS and labs from the last 24 hours.    The patient's case was discussed with the treatment team/care providers today including Rns    Staff reports no behavioral or management issues.     The patient has been compliant with treatment.      Subjective 06/16/2024    Pt with ongoing diarrhea overnight and this morning despite starting sold food yesterday. He also reports a couple small episodes of vomiting. W/d symptoms continuing. He insists that his N/v is not related to alcohol abuse.     He describes episodes of dizziness, as well as episodes during which he feels like he has been hit in the head and is somewhat unsteady in his gate. States that this occurs independent of drinking EtOH. Has not yet seen Neuro or ENT.     No s/s of DT, no seizures.VS stable today, though pulse is somewhat elevated. Suspect this is due to dehydration. Encouraged attempts to maintain PO intake whenever possible.  Tremor remains prominent, L>R.     The patient denies any side effects to medications.    At this time symptoms remains severe, functionally and behaviorally impairing and pt remains in need of continued acute inpatient hospitalization for both safety and stabilization.           Interim/overnight events per report/notes:      Psychiatric ROS (observed, reported, or endorsed/denied):  Depressed mood - Continuing  Interest/pleasure/anhedonia: fluctuating  Guilt/hopelessness/worthlessness - Continuing  Changes in Sleep -  less  Changes in Appetite - less  Changes in Concentration - fluctuating  Changes in Energy - fluctuating  PMA/R- fluctuating  Suicidal- active/passive ideations - fluctuating  Homicidal ideations: active/passive ideations - No    Hallucinations - No  Delusions - No  Disorganized behavior - No  Disorganized speech - No  Negative symptoms - No    Elevated mood - No  Decreased need for sleep - No  Grandiosity - No  Racing thoughts - No  Impulsivity - No  Irritability- No  Increased energy - No  Distractibility - No  Increase in goal-directed activity or PMA- No    Symptoms of CAROLA - fluctuating  Symptoms of Panic Disorder- fluctuating  Symptoms of PTSD - fluctuating        Overall progress: slow      Medical ROS  Review of Systems   Constitutional:  Negative for chills and fever.   HENT:  Negative for hearing loss.    Eyes:  Negative for blurred vision and double vision.   Respiratory:  Negative for shortness of breath.    Cardiovascular:  Negative for chest pain and palpitations.   Gastrointestinal:  Negative for constipation, diarrhea, nausea and vomiting.   Genitourinary:  Negative for dysuria and urgency.   Musculoskeletal:  Negative for joint pain.   Skin:  Negative for rash.   Neurological:  Negative for dizziness and headaches.   Endo/Heme/Allergies:  Negative for environmental allergies.         PAST MEDICAL HISTORY   Past Medical History:   Diagnosis Date    Addiction to drug     Alcohol abuse     last drink 9/06/22    Anxiety     Bipolar disorder     COPD (chronic obstructive pulmonary disease)     Depression     Disc disorder     Fatigue     Headache     History of psychiatric hospitalization     Hx of psychiatric care     Hypertension 6/13/2024    Panic disorder     Psychiatric problem     PTSD (post-traumatic stress disorder)     Sleep difficulties     Suicide attempt     Therapy     Withdrawal symptoms, alcohol            PSYCHOTROPIC MEDICATIONS   Scheduled Meds:   ARIPiprazole  10 mg Oral Daily     diazePAM  10 mg Oral TID    EScitalopram oxalate  20 mg Oral Daily    folic acid  1 mg Oral Daily    lisinopriL  10 mg Oral Daily    multivitamin  1 tablet Oral Daily    nicotine  1 patch Transdermal Daily    pantoprazole  40 mg Oral Daily    sucralfate  1 g Oral QID (AC & HS)    thiamine  100 mg Oral Daily     Continuous Infusions:  PRN Meds:.  Current Facility-Administered Medications:     acetaminophen, 650 mg, Oral, Q6H PRN    albuterol, 2 puff, Inhalation, Q6H PRN **AND** MDI Q6H PRN, , , Q6H PRN    hydrOXYzine pamoate, 50 mg, Oral, Q6H PRN    loperamide, 2 mg, Oral, PRN    LORazepam, 1 mg, Oral, Q6H PRN    melatonin, 6 mg, Oral, Nightly PRN    ondansetron, 4 mg, Oral, Q8H PRN    simethicone, 1 tablet, Oral, TID PRN        EXAMINATION    VITALS   Vitals:    06/16/24 0145 06/16/24 0720 06/16/24 0800 06/16/24 1213   BP:  128/63  111/65   BP Location:  Left arm  Left arm   Patient Position:  Sitting  Sitting   Pulse: 92 (!) 111  104   Resp:  18  20   Temp:  97.1 °F (36.2 °C)  97.5 °F (36.4 °C)   TempSrc:  Tympanic  Tympanic   SpO2:  99%  100%   Weight:   84.6 kg (186 lb 9.9 oz)    Height:           Body mass index is 25.31 kg/m².        CONSTITUTIONAL  General Appearance: unremarkable, age appropriate    MUSCULOSKELETAL  Muscle Strength and Tone:no tic, less tremor  Abnormal Involuntary Movements: No  Gait and Station: non-ataxic    PSYCHIATRIC   Level of Consciousness: awake and alert   Orientation: person, place, and situation  Grooming: Hospital garb and Well groomed  Psychomotor Behavior: normal, cooperative  Speech: normal tone, normal rate, normal pitch, normal volume  Language: grossly intact  Mood: depressed  Affect: Consistent with mood  Thought Process: linear, logical  Associations: intact   Thought Content: less SI, denies HI, and no delusions  Perceptions: denies AH, denies  VH, no TH, and not RIS  Memory: Able to recall past events, Remote intact, and Recent intact  Attention:Attends to interview  without distraction  Fund of Knowledge: Aware of current events and Vocabulary appropriate   Estimate if Intelligence:  Average based on work/education history, vocabulary and mental status exam  Insight: has awareness of illness  Judgment: behavior is adequate to circumstances        DIAGNOSTIC TESTING   Laboratory Results  No results found for this or any previous visit (from the past 24 hour(s)).        MEDICAL DECISION MAKING          ASSESSMENT         MDD, severe  Unspecified psychosis  SI  Anxiety  PTSD  Alcohol abuse        PROBLEM LIST AND MANAGEMENT PLANS           MDD  - continue lexapro 20 mg pO qd  - resume abilify 5 mg PO qd  -increase to 7 mg PO qd -increase to 10 mg PO qd tomorrow  - pt counseled  - follow up with outpatient mental health providers after discharge for medication management and psychotherapy        Psychosis  - r/o SIPD  - Abilify as above  - pt counseled  - follow up with outpatient mental health providers after discharge for medication management and psychotherapy     Suicidal ideations  - continue psychiatric hospitalization  - provide psychotherapeutic interventions and medication management     Anxiety  - started/continue hydroxyzine 50 mg PO q 6 hours PRN  - lexapro as above  - pt counseled  - follow up with outpatient mental health providers after discharge for medication management and psychotherapy     PTSD   - lexapro as above  - pt counseled  - follow up with outpatient mental health providers after discharge for medication management and psychotherapy     Alcohol abuse  Alcohol Brief Intervention     Discussed with patient that there is concern he/she is drinking at unhealthy levels known to increase his/her risk of alcohol-related health problems - Yes  Discussed general feedback on health risks associated with drinking and/or given personalized feedback - Yes  Patient was advised to abstain from alcohol use - Yes     - change valium 10 mg PO q 4 hours to 20 mg PO TID   -decrease to 20 mg/10/10 mg - decrease to 10 mg PO TID today -- continue due to ongoing EtOH WD symtpoms, tremor, tachycardia  - ativan 1 mg PO q 6 hours PRN for breakthrough w/d parameters  - consider vivitrol prior to discharge  - consider gabapentin                Discussed diagnosis, risks and benefits of proposed treatment vs alternative treatments vs no treatment, potential side effects of these treatments and the inherent unpredictability of treatment. The patient expresses understanding of the above and displays the capacity to agree with this treatment given said understanding. Patient also agrees that, currently, the benefits outweigh the risks and would like to pursue/continue treatment at this time.    Any medications being used off-label were discussed with the patient inclusive of the evidence base for the use of the medications and consent was obtained for the off-label use of the medication.       DISCHARGE PLANNING  Expected Disposition Plan: Home or Self Care      NEED FOR CONTINUED HOSPITALIZATION  Psychiatric illness continues to pose a potential threat to life or bodily function, of self or others, thereby requiring the need for continued inpatient psychiatric hospitalization: Yes, due to: danger to self, as evidenced by:  Ongoing concerns with SI.    Protective inpatient pyschiatric hospitalization required while a safe disposition plan is enacted: Yes    Patient stabilized and ready for discharge from inpatient psychiatric unit: No        STAFF:   Ted Shepard MD  Psychiatry

## 2024-06-16 NOTE — PLAN OF CARE
"Denies suicidal thoughts at this time.  Cooperative and calm.  Pleasant.  Said his mood is better.  Voiced speaking to his wife on phone tonight about rehab.  "She said she will think about it and will let me know tomorrow".  "Rehab for me is her call".  "She's disabled.  If she needs me, I'm there.  We've been together for 15 years".  Tending to ADL's well at this time.  Encouraged increased fluids and regular meals.  VS stable.  Gait steady.  Accepting prescribed medications.  Stressed compliance with prescribed medications upon discharge.  Only complaints this evening were slight nausea and much flatulence.    "

## 2024-06-16 NOTE — PLAN OF CARE
Pt reports mood improving and detox symptoms improving.  Denies any S/I or H/I at this time.  Pt still undecided on rehab after discharge.  Still having diarrhea, imodium given prn at 0715 for loose stool.  Diet changed to sandwich and broth for lunch and supper.  Pt interacting well on the unit.  Still some mild hand tremors noted bilaterally.  No acute distress apparent at this time, will continue to monitor.

## 2024-06-17 PROCEDURE — 11400000 HC PSYCH PRIVATE ROOM

## 2024-06-17 PROCEDURE — 25000003 PHARM REV CODE 250: Performed by: PHYSICIAN ASSISTANT

## 2024-06-17 PROCEDURE — 25000003 PHARM REV CODE 250: Performed by: STUDENT IN AN ORGANIZED HEALTH CARE EDUCATION/TRAINING PROGRAM

## 2024-06-17 PROCEDURE — S4991 NICOTINE PATCH NONLEGEND: HCPCS | Performed by: STUDENT IN AN ORGANIZED HEALTH CARE EDUCATION/TRAINING PROGRAM

## 2024-06-17 PROCEDURE — 99233 SBSQ HOSP IP/OBS HIGH 50: CPT | Mod: AF,HB,, | Performed by: STUDENT IN AN ORGANIZED HEALTH CARE EDUCATION/TRAINING PROGRAM

## 2024-06-17 RX ORDER — MIRTAZAPINE 15 MG/1
15 TABLET, ORALLY DISINTEGRATING ORAL NIGHTLY
Status: DISCONTINUED | OUTPATIENT
Start: 2024-06-17 | End: 2024-06-20 | Stop reason: HOSPADM

## 2024-06-17 RX ORDER — DIAZEPAM 10 MG/1
10 TABLET ORAL 2 TIMES DAILY
Status: DISCONTINUED | OUTPATIENT
Start: 2024-06-17 | End: 2024-06-18

## 2024-06-17 RX ADMIN — Medication 6 MG: at 08:06

## 2024-06-17 RX ADMIN — SIMETHICONE 80 MG: 80 TABLET, CHEWABLE ORAL at 05:06

## 2024-06-17 RX ADMIN — NICOTINE 1 PATCH: 21 PATCH, EXTENDED RELEASE TRANSDERMAL at 08:06

## 2024-06-17 RX ADMIN — ESCITALOPRAM 20 MG: 20 TABLET, FILM COATED ORAL at 08:06

## 2024-06-17 RX ADMIN — LOPERAMIDE HYDROCHLORIDE 2 MG: 2 CAPSULE ORAL at 05:06

## 2024-06-17 RX ADMIN — PANTOPRAZOLE SODIUM 40 MG: 40 TABLET, DELAYED RELEASE ORAL at 08:06

## 2024-06-17 RX ADMIN — MIRTAZAPINE 15 MG: 15 TABLET, ORALLY DISINTEGRATING ORAL at 08:06

## 2024-06-17 RX ADMIN — ONDANSETRON 4 MG: 4 TABLET, ORALLY DISINTEGRATING ORAL at 05:06

## 2024-06-17 RX ADMIN — LOPERAMIDE HYDROCHLORIDE 2 MG: 2 CAPSULE ORAL at 08:06

## 2024-06-17 RX ADMIN — THERA TABS 1 TABLET: TAB at 08:06

## 2024-06-17 RX ADMIN — ARIPIPRAZOLE 10 MG: 10 TABLET ORAL at 08:06

## 2024-06-17 RX ADMIN — FOLIC ACID 1 MG: 1 TABLET ORAL at 08:06

## 2024-06-17 RX ADMIN — DIAZEPAM 10 MG: 10 TABLET ORAL at 08:06

## 2024-06-17 RX ADMIN — HYDROXYZINE PAMOATE 50 MG: 50 CAPSULE ORAL at 08:06

## 2024-06-17 RX ADMIN — LISINOPRIL 10 MG: 10 TABLET ORAL at 08:06

## 2024-06-17 NOTE — PLAN OF CARE
Patient calm and cooperative. No complaints voiced. Med compliant. Appetite adequate. Patient denies SI/HI/Hallucinations, no self harming behavior displayed, no aggressive behavior displayed towards others. Patient contracted safety with staff and unit. Discussed healthy coping skills and techniques. Educated, reviewed and discussed plan of care and medication regiment with this patient. 1:1 with patient. Patient voiced understanding of all teachings.

## 2024-06-17 NOTE — PSYCH
[facility] discussed with pt on collateral consent. Pt signed collateral consent for Preethi Jimenez/wife [phone]. [facility] attemtped to contact collateral consent to discuss pt admission.  Wife stated:        Reason for admission- describe what happened?    He was drinking really and he ws drinking about 1 fifth a vodka daily and it was pur. Like he did not put anything in it besides ice. The Wednesday before he went in he was throwing up the whole day. He has been having problems with drinking for years.               Prior treatment places and dates-doctor name and location  Reason for prior treatment- same or different  How long has patient had problem(s)?      He has been in several rehabs for this and the last one was [facility] and had to come home because he had COVID and had to come home. The 28 day programs do not work for him and he comes out ans just starts to drink again.     Substance abuse- what , how long, how much, how often?   He has not used any drugs either than the prescribed drugs.     Legal Issues- Current charges, type of offense, probation or parole?    He does not at this time, but he did have 3 DUI's way before I met him.     History of suicide attempts- when and what methods, did they require medical attention?    He has tried 2x's and by taking pills. He thought about slitting his wrists in the past, but it was just him thinking about it.     Alcohol Use-  What preference of alcohol, how much, how long, how often?      History of violence-describe behaviors and triggers       He is sometimes he gets violent by verbal and aggravation to where he gets very, very verbally   Any guns or weapons in the home? If yes, recommend that these be secured.       none  What is patients baseline behavior/mood- how well do they know if patient is doing well?    His attitude changes when he is not on alcohol and will do anything for you.     What helps the patient stay well?  Internal/external coping strategies  ( attending meetings, going to groups, taking medications, spending time with family ( etc)?    When he is not drinking and not switching his mood all the time when he is drinking.     Discharge plan:    Where will the patient live upon discharge?   He will come back home    Who else in the home?  Just myself and him    Will someone be able to pick the patient up when discharged?     He will need a medicaid ride.

## 2024-06-17 NOTE — PROGRESS NOTES
"   06/17/24 5621   [facility] Group Therapy   Group Name Therapeutic Recreation   Specific Interventions Cognitive Stimulation Training   Participation Level Appropriate;Attentive   Participation Quality Cooperative;Social   Insight/Motivation Applies New Skills;Good   Affect/Mood Display Appropriate   Cognition Alert   Psychomotor WNL     Patient presents calm, cooperative, reports a good mood. Patient learned a new leisure activity and was given resource information. Patient remained alert, involved, asked where he could buy the activity, "I want to buy this for my house. This a good game."  "

## 2024-06-17 NOTE — NURSING
Rested quietly with closed eyes and even resp for 7.25 hours.  Modified VC and all precautions maintained.  Pathways clear and bed in low position.
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
## 2024-06-17 NOTE — PROGRESS NOTES
PSYCHIATRY DAILY INPATIENT PROGRESS NOTE  SUBSEQUENT HOSPITAL VISIT    ENCOUNTER DATE: 6/17/2024  SITE: [facility]    DATE OF ADMISSION: 6/11/2024  3:58 PM  LENGTH OF STAY: 6 days      CHIEF COMPLAINT   Maicol Millan III is a 53 y.o. male, seen during daily horne rounds on the inpatient unit.  Maicol Millan III presented with the chief complaint of depression, anxiety, and substance problems      The patient was seen and examined. The chart was reviewed.     Reviewed notes from Rns and CTRS and labs from the last 24 hours.    The patient's case was discussed with the treatment team/care providers today including Rns    Staff reports no behavioral or management issues.     The patient has been compliant with treatment.      Subjective 06/17/2024    Pt continues to have GI sx, reports watery diarrhea and vomiting.     Discussed alcohol relapse prevention. He is interested in MAT and AA.    The patient denies any side effects to medications.    At this time symptoms remains severe, functionally and behaviorally impairing and pt remains in need of continued acute inpatient hospitalization for both safety and stabilization.           Interim/overnight events per report/notes:      Psychiatric ROS (observed, reported, or endorsed/denied):  Depressed mood - fluctuating  Interest/pleasure/anhedonia: less  Guilt/hopelessness/worthlessness - less  Changes in Sleep - less  Changes in Appetite - less  Changes in Concentration - fluctuating  Changes in Energy - fluctuating  PMA/R- fluctuating  Suicidal- active/passive ideations - less  Homicidal ideations: active/passive ideations - No    Hallucinations - No  Delusions - No  Disorganized behavior - No  Disorganized speech - No  Negative symptoms - No    Elevated mood - No  Decreased need for sleep - No  Grandiosity - No  Racing thoughts - No  Impulsivity - No  Irritability- No  Increased energy - No  Distractibility - No  Increase in goal-directed activity or PMA-  No    Symptoms of CAROLA - fluctuating  Symptoms of Panic Disorder- fluctuating  Symptoms of PTSD - fluctuating        Overall progress: slow      Medical ROS  Review of Systems   Constitutional:  Negative for chills and fever.   HENT:  Negative for hearing loss.    Eyes:  Negative for blurred vision and double vision.   Respiratory:  Negative for shortness of breath.    Cardiovascular:  Negative for chest pain and palpitations.   Gastrointestinal:  Negative for constipation, diarrhea, nausea and vomiting.   Genitourinary:  Negative for dysuria and urgency.   Musculoskeletal:  Negative for joint pain.   Skin:  Negative for rash.   Neurological:  Negative for dizziness and headaches.   Endo/Heme/Allergies:  Negative for environmental allergies.         PAST MEDICAL HISTORY   Past Medical History:   Diagnosis Date    Addiction to drug     Alcohol abuse     last drink 9/06/22    Anxiety     Bipolar disorder     COPD (chronic obstructive pulmonary disease)     Depression     Disc disorder     Fatigue     Headache     History of psychiatric hospitalization     Hx of psychiatric care     Hypertension 6/13/2024    Panic disorder     Psychiatric problem     PTSD (post-traumatic stress disorder)     Sleep difficulties     Suicide attempt     Therapy     Withdrawal symptoms, alcohol            PSYCHOTROPIC MEDICATIONS   Scheduled Meds:   ARIPiprazole  10 mg Oral Daily    diazePAM  10 mg Oral BID    EScitalopram oxalate  20 mg Oral Daily    folic acid  1 mg Oral Daily    lisinopriL  10 mg Oral Daily    multivitamin  1 tablet Oral Daily    nicotine  1 patch Transdermal Daily    pantoprazole  40 mg Oral Daily    sucralfate  1 g Oral QID (AC & HS)    thiamine  100 mg Oral Daily     Continuous Infusions:  PRN Meds:.  Current Facility-Administered Medications:     acetaminophen, 650 mg, Oral, Q6H PRN    albuterol, 2 puff, Inhalation, Q6H PRN **AND** MDI Q6H PRN, , , Q6H PRN    hydrOXYzine pamoate, 50 mg, Oral, Q6H PRN    loperamide, 2  mg, Oral, PRN    LORazepam, 1 mg, Oral, Q6H PRN    melatonin, 6 mg, Oral, Nightly PRN    ondansetron, 4 mg, Oral, Q8H PRN    simethicone, 1 tablet, Oral, TID PRN        EXAMINATION    VITALS   Vitals:    06/16/24 1213 06/16/24 1637 06/16/24 1922 06/17/24 0723   BP: 111/65 95/62 119/68 108/79   BP Location: Left arm Left arm Left arm Left arm   Patient Position: Sitting Sitting Lying Sitting   Pulse: 104 106 84 99   Resp: 20 20 18 18   Temp: 97.5 °F (36.4 °C) 97.5 °F (36.4 °C) 97.1 °F (36.2 °C) 97.6 °F (36.4 °C)   TempSrc: Tympanic Tympanic Tympanic Tympanic   SpO2: 100% 100%  99%   Weight:       Height:           Body mass index is 25.31 kg/m².        CONSTITUTIONAL  General Appearance: unremarkable, age appropriate    MUSCULOSKELETAL  Muscle Strength and Tone:no tic, less tremor  Abnormal Involuntary Movements: No  Gait and Station: non-ataxic    PSYCHIATRIC   Level of Consciousness: awake and alert   Orientation: person, place, and situation  Grooming: Hospital garb and Well groomed  Psychomotor Behavior: normal, cooperative  Speech: normal tone, normal rate, normal pitch, normal volume  Language: grossly intact  Mood: ok  Affect: Consistent with mood  Thought Process: linear, logical  Associations: intact   Thought Content: less SI, denies HI, and no delusions  Perceptions: denies AH, denies  VH, no TH, and not RIS  Memory: Able to recall past events, Remote intact, and Recent intact  Attention:Attends to interview without distraction  Fund of Knowledge: Aware of current events and Vocabulary appropriate   Estimate if Intelligence:  Average based on work/education history, vocabulary and mental status exam  Insight: has awareness of illness  Judgment: behavior is adequate to circumstances        DIAGNOSTIC TESTING   Laboratory Results  No results found for this or any previous visit (from the past 24 hour(s)).        MEDICAL DECISION MAKING          ASSESSMENT         MDD, severe  Unspecified  psychosis  SI  Anxiety  PTSD  Alcohol abuse        PROBLEM LIST AND MANAGEMENT PLANS           MDD  - continue lexapro 20 mg pO qd  - resume abilify 5 mg PO qd  -increase to 7 mg PO qd -increase to 10 mg PO qd -increase to 15 mg PO qd tomorrow  - start remeron 15 mg PO qhs tonight  - pt counseled  - follow up with outpatient mental health providers after discharge for medication management and psychotherapy        Psychosis  - r/o SIPD  - Abilify as above  - pt counseled  - follow up with outpatient mental health providers after discharge for medication management and psychotherapy     Suicidal ideations  - continue psychiatric hospitalization  - provide psychotherapeutic interventions and medication management     Anxiety  - started/continue hydroxyzine 50 mg PO q 6 hours PRN  - lexapro as above  - pt counseled  - follow up with outpatient mental health providers after discharge for medication management and psychotherapy     PTSD   - lexapro as above  - pt counseled  - follow up with outpatient mental health providers after discharge for medication management and psychotherapy     Alcohol abuse  Alcohol Brief Intervention     Discussed with patient that there is concern he/she is drinking at unhealthy levels known to increase his/her risk of alcohol-related health problems - Yes  Discussed general feedback on health risks associated with drinking and/or given personalized feedback - Yes  Patient was advised to abstain from alcohol use - Yes     - change valium 10 mg PO q 4 hours to 20 mg PO TID  -decrease to 20 mg/10/10 mg - decrease to 10 mg PO TID -decrease to BID today    - ativan 1 mg PO q 6 hours PRN for breakthrough w/d parameters  - consider vivitrol prior to discharge  - consider gabapentin                Discussed diagnosis, risks and benefits of proposed treatment vs alternative treatments vs no treatment, potential side effects of these treatments and the inherent unpredictability of treatment. The  patient expresses understanding of the above and displays the capacity to agree with this treatment given said understanding. Patient also agrees that, currently, the benefits outweigh the risks and would like to pursue/continue treatment at this time.    Any medications being used off-label were discussed with the patient inclusive of the evidence base for the use of the medications and consent was obtained for the off-label use of the medication.       DISCHARGE PLANNING  Expected Disposition Plan: Home or Self Care      NEED FOR CONTINUED HOSPITALIZATION  Psychiatric illness continues to pose a potential threat to life or bodily function, of self or others, thereby requiring the need for continued inpatient psychiatric hospitalization: Yes, due to: danger to self, as evidenced by:  Ongoing concerns with SI.    Protective inpatient pyschiatric hospitalization required while a safe disposition plan is enacted: Yes    Patient stabilized and ready for discharge from inpatient psychiatric unit: No        STAFF:   Adalberto Oneal III, MD  Psychiatry

## 2024-06-17 NOTE — PLAN OF CARE
"Denies suicidal thoughts at this time.  Verbally contracted for safety.  Reports he is not going to rehab from here.  States his "wife fell" and her son is staying with her until son goes to work Saturday at 04:30.  Pt said he wants treatment until Friday so won't sign 72 until Tues.  Refused sucralfate this evening saying when he vomits, he's vomiting pink stuff.  Reviewed the importance and purpose of this medication.  He voiced understanding.  Complained of nausea and diarrhea.  See MAR.  VS stable.  Encouraged increased fluids and regular meals.  Encouraged ETOH free lifestyle.  Stressed compliance with prescribed medications upon discharge.   "

## 2024-06-17 NOTE — PROGRESS NOTES
"   06/17/24 1020   [facility] Group Therapy   Group Name Stress Management   Specific Interventions Skilled Activity Stress Management   Participation Level Sharing;Appropriate   Participation Quality Cooperative;Social   Insight/Motivation Applies New Skills;Improved   Affect/Mood Display Appropriate   Cognition Alert   Psychomotor WNL     Patient presents calm, cooperative, reports a "good" mood, "I had a good weekend, slept good, ate good, had some good conversations. I still have the shakes."  Patient left group early due to seeing the counselor.  "

## 2024-06-18 LAB
ANION GAP SERPL CALC-SCNC: 9 MMOL/L (ref 8–16)
BUN SERPL-MCNC: 16 MG/DL (ref 6–20)
CALCIUM SERPL-MCNC: 9.9 MG/DL (ref 8.7–10.5)
CHLORIDE SERPL-SCNC: 102 MMOL/L (ref 95–110)
CO2 SERPL-SCNC: 26 MMOL/L (ref 23–29)
CREAT SERPL-MCNC: 0.9 MG/DL (ref 0.5–1.4)
EST. GFR  (NO RACE VARIABLE): >60 ML/MIN/1.73 M^2
GLUCOSE SERPL-MCNC: 78 MG/DL (ref 70–110)
MAGNESIUM SERPL-MCNC: 1.5 MG/DL (ref 1.6–2.6)
POTASSIUM SERPL-SCNC: 4.8 MMOL/L (ref 3.5–5.1)
SODIUM SERPL-SCNC: 137 MMOL/L (ref 136–145)

## 2024-06-18 PROCEDURE — 11400000 HC PSYCH PRIVATE ROOM

## 2024-06-18 PROCEDURE — 25000003 PHARM REV CODE 250: Performed by: STUDENT IN AN ORGANIZED HEALTH CARE EDUCATION/TRAINING PROGRAM

## 2024-06-18 PROCEDURE — 83735 ASSAY OF MAGNESIUM: CPT | Performed by: PHYSICIAN ASSISTANT

## 2024-06-18 PROCEDURE — 80048 BASIC METABOLIC PNL TOTAL CA: CPT | Performed by: PHYSICIAN ASSISTANT

## 2024-06-18 PROCEDURE — S4991 NICOTINE PATCH NONLEGEND: HCPCS | Performed by: STUDENT IN AN ORGANIZED HEALTH CARE EDUCATION/TRAINING PROGRAM

## 2024-06-18 PROCEDURE — 99233 SBSQ HOSP IP/OBS HIGH 50: CPT | Mod: AF,HB,, | Performed by: STUDENT IN AN ORGANIZED HEALTH CARE EDUCATION/TRAINING PROGRAM

## 2024-06-18 PROCEDURE — 36415 COLL VENOUS BLD VENIPUNCTURE: CPT | Performed by: PHYSICIAN ASSISTANT

## 2024-06-18 PROCEDURE — 25000003 PHARM REV CODE 250: Performed by: PHYSICIAN ASSISTANT

## 2024-06-18 PROCEDURE — 90833 PSYTX W PT W E/M 30 MIN: CPT | Mod: AF,HB,, | Performed by: STUDENT IN AN ORGANIZED HEALTH CARE EDUCATION/TRAINING PROGRAM

## 2024-06-18 RX ORDER — GABAPENTIN 100 MG/1
200 CAPSULE ORAL 3 TIMES DAILY
Status: DISCONTINUED | OUTPATIENT
Start: 2024-06-18 | End: 2024-06-19

## 2024-06-18 RX ORDER — NALTREXONE HYDROCHLORIDE 50 MG/1
25 TABLET, FILM COATED ORAL DAILY
Status: DISCONTINUED | OUTPATIENT
Start: 2024-06-18 | End: 2024-06-20

## 2024-06-18 RX ADMIN — GABAPENTIN 200 MG: 100 CAPSULE ORAL at 08:06

## 2024-06-18 RX ADMIN — HYDROXYZINE PAMOATE 50 MG: 50 CAPSULE ORAL at 08:06

## 2024-06-18 RX ADMIN — Medication 6 MG: at 08:06

## 2024-06-18 RX ADMIN — Medication 100 MG: at 08:06

## 2024-06-18 RX ADMIN — THERA TABS 1 TABLET: TAB at 08:06

## 2024-06-18 RX ADMIN — NALTREXONE HYDROCHLORIDE 25 MG: 50 TABLET, FILM COATED ORAL at 12:06

## 2024-06-18 RX ADMIN — DIAZEPAM 10 MG: 10 TABLET ORAL at 08:06

## 2024-06-18 RX ADMIN — ARIPIPRAZOLE 15 MG: 10 TABLET ORAL at 08:06

## 2024-06-18 RX ADMIN — GABAPENTIN 200 MG: 100 CAPSULE ORAL at 02:06

## 2024-06-18 RX ADMIN — ESCITALOPRAM 20 MG: 20 TABLET, FILM COATED ORAL at 08:06

## 2024-06-18 RX ADMIN — PANTOPRAZOLE SODIUM 40 MG: 40 TABLET, DELAYED RELEASE ORAL at 08:06

## 2024-06-18 RX ADMIN — ACETAMINOPHEN 650 MG: 325 TABLET ORAL at 06:06

## 2024-06-18 RX ADMIN — MIRTAZAPINE 15 MG: 15 TABLET, ORALLY DISINTEGRATING ORAL at 08:06

## 2024-06-18 RX ADMIN — FOLIC ACID 1 MG: 1 TABLET ORAL at 08:06

## 2024-06-18 RX ADMIN — LISINOPRIL 10 MG: 10 TABLET ORAL at 08:06

## 2024-06-18 RX ADMIN — NICOTINE 1 PATCH: 21 PATCH, EXTENDED RELEASE TRANSDERMAL at 08:06

## 2024-06-18 NOTE — PROGRESS NOTES
"   06/18/24 1015   [facility] Group Therapy   Group Name Therapeutic Recreation   Specific Interventions Skilled Activity Leisure Education and Awareness  (identified new leisure resource information)   Participation Level Appropriate;Attentive;Sharing   Participation Quality Cooperative;Social   Insight/Motivation Good;Applies New Skills   Affect/Mood Display Appropriate   Cognition Alert   Psychomotor WNL     Patient presents calm, cooperative, socializing, "feel gorgeous, good" mood. Patient shows interest, friendly competition, initial ability to follow directions, ask questions, was receptive to resource information, remained involved and enjoyed the activity.  "

## 2024-06-18 NOTE — NURSING
Discussed and educated this patient on the need for a stool specimen because this patient reported having multiple occurrences of  diarrhea. Instructed patient to call when specimen is collected. Stool specimen cup placed in the bathroom. Patient voiced understanding

## 2024-06-18 NOTE — NURSING
Pt sleeping at this time, slept 9 hrs with 3 awakenings. NAD. Resp even and unlabored.Pathways clear,bed in low position. Q 15 min safety check ongoing.All precautions maintained.

## 2024-06-18 NOTE — PLAN OF CARE
"Patient reports feeling "okay" today, spending majority of time in room isolated with minimal interaction with staff and peers. Denies SI/HI at this time. Denies depression. Denies hallucinations. Appetite adequate. PRN melatonin administered to aid with sleep. PRN Vistaril administered to aid with anxiety. Remains calm and cooperative. NADN. Medication compliant. Safety precautions remain in place.  "

## 2024-06-18 NOTE — PROGRESS NOTES
PSYCHIATRY DAILY INPATIENT PROGRESS NOTE  SUBSEQUENT HOSPITAL VISIT    ENCOUNTER DATE: 6/18/2024  SITE: [facility]    DATE OF ADMISSION: 6/11/2024  3:58 PM  LENGTH OF STAY: 7 days      CHIEF COMPLAINT   Maicol Millan III is a 53 y.o. male, seen during daily horne rounds on the inpatient unit.  Maicol Millan III presented with the chief complaint of depression, anxiety, and substance problems      The patient was seen and examined. The chart was reviewed.     Reviewed notes from Rns and labs from the last 24 hours.    The patient's case was discussed with the treatment team/care providers today including Rns    Staff reports no behavioral or management issues.     The patient has been compliant with treatment.      Subjective 06/18/2024    Pt tolerating valium taper well. He is eating and not vomiting, no diarrhea today.     Explored motivation to quit drinking. Pt states he has been trying all his life to quit, longest period of sobriety was 102 days after rehab and sober living. He states there is an AA meeting near his home he would like to try.    The patient denies any side effects to medications.    At this time symptoms remains severe, functionally and behaviorally impairing and pt remains in need of continued acute inpatient hospitalization for both safety and stabilization.       Psychotherapy:  Target symptoms: depression, anxiety , substance abuse  Why chosen therapy is appropriate versus another modality: relevant to diagnosis  Outcome monitoring methods: self-report  Therapeutic intervention type: supportive psychotherapy  Topics discussed/themes: building skills sets for symptom management, symptom recognition, substance abuse  The patient's response to the intervention is accepting. The patient's progress toward treatment goals is fair.   Duration of intervention: 16 minutes.      Interim/overnight events per report/notes:          Psychiatric ROS (observed, reported, or  endorsed/denied):  Depressed mood - less  Interest/pleasure/anhedonia: less  Guilt/hopelessness/worthlessness - less  Changes in Sleep - less  Changes in Appetite - less  Changes in Concentration - less  Changes in Energy - less  PMA/R- less  Suicidal- active/passive ideations - less  Homicidal ideations: active/passive ideations - No    Hallucinations - No  Delusions - No  Disorganized behavior - No  Disorganized speech - No  Negative symptoms - No    Elevated mood - No  Decreased need for sleep - No  Grandiosity - No  Racing thoughts - No  Impulsivity - No  Irritability- No  Increased energy - No  Distractibility - No  Increase in goal-directed activity or PMA- No    Symptoms of CAROLA - fluctuating  Symptoms of Panic Disorder- fluctuating  Symptoms of PTSD - fluctuating        Overall progress: slow          Medical ROS  Review of Systems   Constitutional:  Negative for chills and fever.   HENT:  Negative for hearing loss.    Eyes:  Negative for blurred vision and double vision.   Respiratory:  Negative for shortness of breath.    Cardiovascular:  Negative for chest pain and palpitations.   Gastrointestinal:  Negative for constipation, diarrhea, nausea and vomiting.   Genitourinary:  Negative for dysuria and urgency.   Musculoskeletal:  Negative for joint pain.   Skin:  Negative for rash.   Neurological:  Negative for dizziness and headaches.   Endo/Heme/Allergies:  Negative for environmental allergies.         PAST MEDICAL HISTORY   Past Medical History:   Diagnosis Date    Addiction to drug     Alcohol abuse     last drink 9/06/22    Anxiety     Bipolar disorder     COPD (chronic obstructive pulmonary disease)     Depression     Disc disorder     Fatigue     Headache     History of psychiatric hospitalization     Hx of psychiatric care     Hypertension 6/13/2024    Panic disorder     Psychiatric problem     PTSD (post-traumatic stress disorder)     Sleep difficulties     Suicide attempt     Therapy     Withdrawal  symptoms, alcohol            PSYCHOTROPIC MEDICATIONS   Scheduled Meds:   ARIPiprazole  15 mg Oral Daily    diazePAM  10 mg Oral BID    EScitalopram oxalate  20 mg Oral Daily    folic acid  1 mg Oral Daily    lisinopriL  10 mg Oral Daily    mirtazapine  15 mg Oral Nightly    multivitamin  1 tablet Oral Daily    nicotine  1 patch Transdermal Daily    pantoprazole  40 mg Oral Daily    thiamine  100 mg Oral Daily     Continuous Infusions:  PRN Meds:.  Current Facility-Administered Medications:     acetaminophen, 650 mg, Oral, Q6H PRN    albuterol, 2 puff, Inhalation, Q6H PRN **AND** MDI Q6H PRN, , , Q6H PRN    hydrOXYzine pamoate, 50 mg, Oral, Q6H PRN    LORazepam, 1 mg, Oral, Q6H PRN    melatonin, 6 mg, Oral, Nightly PRN    ondansetron, 4 mg, Oral, Q8H PRN    simethicone, 1 tablet, Oral, TID PRN        EXAMINATION    VITALS   Vitals:    06/16/24 1922 06/17/24 0723 06/17/24 1909 06/18/24 0701   BP: 119/68 108/79 114/66 126/75   BP Location: Left arm Left arm Right arm Right arm   Patient Position: Lying Sitting Lying Sitting   Pulse: 84 99 72 99   Resp: 18 18 18 15   Temp: 97.1 °F (36.2 °C) 97.6 °F (36.4 °C) 97.3 °F (36.3 °C) 97.4 °F (36.3 °C)   TempSrc: Tympanic Tympanic Oral    SpO2:  99%  98%   Weight:       Height:           Body mass index is 25.31 kg/m².        CONSTITUTIONAL  General Appearance: unremarkable, age appropriate    MUSCULOSKELETAL  Muscle Strength and Tone:no tic, less tremor  Abnormal Involuntary Movements: No  Gait and Station: non-ataxic    PSYCHIATRIC   Level of Consciousness: awake and alert   Orientation: person, place, and situation  Grooming: Hospital garb and Well groomed  Psychomotor Behavior: normal, cooperative  Speech: normal tone, normal rate, normal pitch, normal volume  Language: grossly intact  Mood: alright  Affect: Consistent with mood  Thought Process: linear, logical  Associations: intact   Thought Content: less SI, denies HI, and no delusions  Perceptions: denies AH, denies   VH, no TH, and not RIS  Memory: Able to recall past events, Remote intact, and Recent intact  Attention:Attends to interview without distraction  Fund of Knowledge: Aware of current events and Vocabulary appropriate   Estimate if Intelligence:  Average based on work/education history, vocabulary and mental status exam  Insight: has awareness of illness  Judgment: behavior is adequate to circumstances        DIAGNOSTIC TESTING   Laboratory Results  Recent Results (from the past 24 hour(s))   Basic metabolic panel    Collection Time: 06/18/24  9:40 AM   Result Value Ref Range    Sodium 137 136 - 145 mmol/L    Potassium 4.8 3.5 - 5.1 mmol/L    Chloride 102 95 - 110 mmol/L    CO2 26 23 - 29 mmol/L    Glucose 78 70 - 110 mg/dL    BUN 16 6 - 20 mg/dL    Creatinine 0.9 0.5 - 1.4 mg/dL    Calcium 9.9 8.7 - 10.5 mg/dL    Anion Gap 9 8 - 16 mmol/L    eGFR >60 >60 mL/min/1.73 m^2   Magnesium    Collection Time: 06/18/24  9:40 AM   Result Value Ref Range    Magnesium 1.5 (L) 1.6 - 2.6 mg/dL           MEDICAL DECISION MAKING          ASSESSMENT         MDD, severe  Unspecified psychosis  SI  Anxiety  PTSD  Alcohol abuse        PROBLEM LIST AND MANAGEMENT PLANS           MDD  - continue lexapro 20 mg pO qd  - resume abilify 5 mg PO qd  -increase to 7 mg PO qd -increase to 10 mg PO qd -increase to 15 mg PO qd today  - started/continue remeron 15 mg PO qhs   - pt counseled  - follow up with outpatient mental health providers after discharge for medication management and psychotherapy        Psychosis  - r/o SIPD  - Abilify as above  - pt counseled  - follow up with outpatient mental health providers after discharge for medication management and psychotherapy     Suicidal ideations  - continue psychiatric hospitalization  - provide psychotherapeutic interventions and medication management     Anxiety  - started/continue hydroxyzine 50 mg PO q 6 hours PRN  - lexapro as above  - pt counseled  - follow up with outpatient mental health  providers after discharge for medication management and psychotherapy     PTSD   - lexapro as above  - pt counseled  - follow up with outpatient mental health providers after discharge for medication management and psychotherapy     Alcohol abuse  Alcohol Brief Intervention     Discussed with patient that there is concern he/she is drinking at unhealthy levels known to increase his/her risk of alcohol-related health problems - Yes  Discussed general feedback on health risks associated with drinking and/or given personalized feedback - Yes  Patient was advised to abstain from alcohol use - Yes     - change valium 10 mg PO q 4 hours to 20 mg PO TID  -decrease to 20 mg/10/10 mg - decrease to 10 mg PO TID -decrease to BID -decrease to qd today then stop  - continue ativan 1 mg PO q 6 hours PRN for breakthrough w/d parameters  - start naltrexone 25 mg pO qd today, planning for vivitrol if PO tolerable  - start gabapentin 200 mg PO BID today                Discussed diagnosis, risks and benefits of proposed treatment vs alternative treatments vs no treatment, potential side effects of these treatments and the inherent unpredictability of treatment. The patient expresses understanding of the above and displays the capacity to agree with this treatment given said understanding. Patient also agrees that, currently, the benefits outweigh the risks and would like to pursue/continue treatment at this time.    Any medications being used off-label were discussed with the patient inclusive of the evidence base for the use of the medications and consent was obtained for the off-label use of the medication.       DISCHARGE PLANNING  Expected Disposition Plan: Home or Self Care      NEED FOR CONTINUED HOSPITALIZATION  Psychiatric illness continues to pose a potential threat to life or bodily function, of self or others, thereby requiring the need for continued inpatient psychiatric hospitalization: Yes, due to: danger to self, as  evidenced by:  Ongoing concerns with SI.    Protective inpatient pyschiatric hospitalization required while a safe disposition plan is enacted: Yes    Patient stabilized and ready for discharge from inpatient psychiatric unit: No        STAFF:   Adalberto Oneal III, MD  Psychiatry

## 2024-06-18 NOTE — PLAN OF CARE
Patient out on unit, interacting well with peers. Med compliant. Appetite adequate. Patient denies SI/HI/Hallucinations, no self harming behavior displayed, no aggressive behavior displayed towards others. Patient contracted safety with staff and unit. Discussed healthy coping skills and techniques. Educated, reviewed and discussed plan of care and medication regiment with this patient 1:1 with patient. Patient voiced understanding of all teachings.

## 2024-06-18 NOTE — PROGRESS NOTES
"   06/18/24 3586   [facility] Group Therapy   Group Name Leisure Education   Specific Interventions Coping Skills Training   Participation Level Appropriate;Attentive;Sharing   Participation Quality Social;Cooperative   Insight/Motivation Applies New Skills;Good   Affect/Mood Display Appropriate   Cognition Alert   Psychomotor WNL     Patient in bed resting when approached, reluctant "I'm resting." Patient changed his mind, attended group, reports a good mood. Patient shared his favorite activities and benefits, horse racing(acceleration), football(student of the game, contact sport). These were activities he enjoyed in the past. Patient also verbalizes that he enjoys sex(relaxation, climax, and time consuming).   "

## 2024-06-19 PROCEDURE — 11400000 HC PSYCH PRIVATE ROOM

## 2024-06-19 PROCEDURE — 25000003 PHARM REV CODE 250: Performed by: STUDENT IN AN ORGANIZED HEALTH CARE EDUCATION/TRAINING PROGRAM

## 2024-06-19 PROCEDURE — 99233 SBSQ HOSP IP/OBS HIGH 50: CPT | Mod: AF,HB,, | Performed by: PSYCHIATRY & NEUROLOGY

## 2024-06-19 PROCEDURE — 90833 PSYTX W PT W E/M 30 MIN: CPT | Mod: AF,HB,, | Performed by: PSYCHIATRY & NEUROLOGY

## 2024-06-19 PROCEDURE — 63600175 PHARM REV CODE 636 W HCPCS: Mod: JZ,TB | Performed by: PSYCHIATRY & NEUROLOGY

## 2024-06-19 PROCEDURE — 25000003 PHARM REV CODE 250: Performed by: PSYCHIATRY & NEUROLOGY

## 2024-06-19 PROCEDURE — S4991 NICOTINE PATCH NONLEGEND: HCPCS | Performed by: STUDENT IN AN ORGANIZED HEALTH CARE EDUCATION/TRAINING PROGRAM

## 2024-06-19 PROCEDURE — 99900035 HC TECH TIME PER 15 MIN (STAT)

## 2024-06-19 PROCEDURE — 25000003 PHARM REV CODE 250: Performed by: PHYSICIAN ASSISTANT

## 2024-06-19 RX ORDER — GABAPENTIN 300 MG/1
300 CAPSULE ORAL 3 TIMES DAILY
Status: DISCONTINUED | OUTPATIENT
Start: 2024-06-19 | End: 2024-06-20 | Stop reason: HOSPADM

## 2024-06-19 RX ADMIN — GABAPENTIN 200 MG: 100 CAPSULE ORAL at 08:06

## 2024-06-19 RX ADMIN — ARIPIPRAZOLE 15 MG: 10 TABLET ORAL at 08:06

## 2024-06-19 RX ADMIN — ESCITALOPRAM 20 MG: 20 TABLET, FILM COATED ORAL at 08:06

## 2024-06-19 RX ADMIN — PANTOPRAZOLE SODIUM 40 MG: 40 TABLET, DELAYED RELEASE ORAL at 08:06

## 2024-06-19 RX ADMIN — GABAPENTIN 300 MG: 300 CAPSULE ORAL at 02:06

## 2024-06-19 RX ADMIN — GABAPENTIN 300 MG: 300 CAPSULE ORAL at 08:06

## 2024-06-19 RX ADMIN — HYDROXYZINE PAMOATE 50 MG: 50 CAPSULE ORAL at 08:06

## 2024-06-19 RX ADMIN — NICOTINE 1 PATCH: 21 PATCH, EXTENDED RELEASE TRANSDERMAL at 08:06

## 2024-06-19 RX ADMIN — Medication 6 MG: at 08:06

## 2024-06-19 RX ADMIN — THERA TABS 1 TABLET: TAB at 08:06

## 2024-06-19 RX ADMIN — Medication 100 MG: at 08:06

## 2024-06-19 RX ADMIN — FOLIC ACID 1 MG: 1 TABLET ORAL at 08:06

## 2024-06-19 RX ADMIN — SIMETHICONE 80 MG: 80 TABLET, CHEWABLE ORAL at 06:06

## 2024-06-19 RX ADMIN — MIRTAZAPINE 15 MG: 15 TABLET, ORALLY DISINTEGRATING ORAL at 08:06

## 2024-06-19 RX ADMIN — NALTREXONE HYDROCHLORIDE 25 MG: 50 TABLET, FILM COATED ORAL at 08:06

## 2024-06-19 RX ADMIN — NALTREXONE 380 MG: KIT at 10:06

## 2024-06-19 RX ADMIN — LISINOPRIL 10 MG: 10 TABLET ORAL at 08:06

## 2024-06-19 RX ADMIN — ACETAMINOPHEN 650 MG: 325 TABLET ORAL at 09:06

## 2024-06-19 NOTE — PROGRESS NOTES
06/19/24 1400   [facility] Group Therapy   Group Name Therapeutic Recreation   Specific Interventions Cognitive Stimulation Training   Participation Level Appropriate;Attentive;Sharing   Participation Quality Cooperative;Social   Insight/Motivation Applies New Skills;Good   Affect/Mood Display Appropriate   Cognition Alert   Psychomotor WNL     Patient presents motivated, pleasant, reports a good mood. Patient problem solved and made independent decisions during cognitive skill activity. Patient remained involved and states this was a good activity.

## 2024-06-19 NOTE — PROGRESS NOTES
"   06/19/24 1040   [facility] Therapy   Group Name Other  (1:1)   Specific Interventions Coping Skills Training   Participation Level Appropriate;Sharing;Attentive   Participation Quality Cooperative;Social   Insight/Motivation Good   Affect/Mood Display Appropriate   Cognition Alert   Psychomotor WNL     Patient presents pleasant, cooperative, reports great mood, "I'm having a good day. The med's are working. They got me on some good medicine. I'm hopeful." Patient shared discharge coping skills of reading, journaling, watching sports, keep taking the medicine and getting the shot. Patient verbalized he wants to stop drinking alcohol.  "

## 2024-06-19 NOTE — PLAN OF CARE
Pt calm and cooperative.  Denies any S/I or H/I at this time.  Mood and behavior appropriate.  Pt educated on vivitrol shot and administer to right GM, pt tolerated well.  Instructed to report any issues or side effects to nursing staff, understanding verbalized.  Pt scheduled for discharge tomorrow.  No complaints voiced at this time.  No acute distress apparent at this time, will continue to monitor.

## 2024-06-19 NOTE — PROGRESS NOTES
PSYCHIATRY DAILY INPATIENT PROGRESS NOTE  SUBSEQUENT HOSPITAL VISIT    ENCOUNTER DATE: 6/19/2024  SITE: [facility]    DATE OF ADMISSION: 6/11/2024  3:58 PM  LENGTH OF STAY: 8 days      CHIEF COMPLAINT   Maicol Millan III is a 53 y.o. male, seen during daily horne rounds on the inpatient unit.  Maicol Millan III presented with the chief complaint of depression, anxiety, and substance problems      The patient was seen and examined. The chart was reviewed.     Reviewed notes from Rns, MD, and CTRS and labs from the last 24 hours.    The patient's case was discussed with the treatment team/care providers today including Rns and LPC    Staff reports no behavioral or management issues.     The patient has been compliant with treatment.      Subjective 06/19/2024  Per yesterday's notes:   Pt tolerating valium taper well. He is eating and not vomiting, no diarrhea today.   Explored motivation to quit drinking. Pt states he has been trying all his life to quit, longest period of sobriety was 102 days after rehab and sober living. He states there is an AA meeting near his home he would like to try.    Today, he notes steady/daily improvements.   Depression is improving; anxiety significantly lessened.   He denied current symptoms of withdrawals. Current CIWA score is 0    Medications were well tolerated and efficacious.     He is more hopeful, future oriented and goal directed. He can better identify positive social support and cooping skills.   He will be stable for discharge home tomorrow.     The patient denies any side effects to medications.    At this time symptoms remains severe, functionally and behaviorally impairing and pt remains in need of continued acute inpatient hospitalization for both safety and stabilization.       Psychotherapy:  Target symptoms: depression, anxiety , substance abuse  Why chosen therapy is appropriate versus another modality: relevant to diagnosis  Outcome monitoring  methods: self-report  Therapeutic intervention type: supportive psychotherapy  Topics discussed/themes: building skills sets for symptom management, symptom recognition, substance abuse  The patient's response to the intervention is accepting. The patient's progress toward treatment goals is fair.   Duration of intervention: 16 minutes.            Psychiatric ROS (observed, reported, or endorsed/denied):  Depressed mood - improving steadily  Interest/pleasure/anhedonia: improving steadily  Guilt/hopelessness/worthlessness - improving steadily  Changes in Sleep - denies  Changes in Appetite - denies  Changes in Concentration - denies  Changes in Energy - denies  PMA/R- denies  Suicidal- active/passive ideations - denies  Homicidal ideations: active/passive ideations - No    Hallucinations - No  Delusions - No  Disorganized behavior - No  Disorganized speech - No  Negative symptoms - No    Elevated mood - No  Decreased need for sleep - No  Grandiosity - No  Racing thoughts - No  Impulsivity - No  Irritability- No  Increased energy - No  Distractibility - No  Increase in goal-directed activity or PMA- No    Symptoms of CAROLA - improving steadily  Symptoms of Panic Disorder- improving steadily  Symptoms of PTSD - improving steadily        Overall progress: significant improvements noted     PSYCHOTHERAPY ADD-ON +[id number]   16-37 minutes    Time: 16 minutes  Participants: Met with patient    Therapeutic Intervention Type: insight oriented psychotherapy, behavior modifying psychotherapy, supportive psychotherapy, interactive psychotherapy  Why chosen therapy is appropriate versus another modality: relevant to diagnosis, patient responds to this modality, evidence based practice    Target symptoms: alcohol abuse, depression, anxiety   Primary focus: depression, psychosocial stressors and alcohol use  Psychotherapeutic techniques: supportive and motivational and psycho-educational techniques     Outcome monitoring methods:  self-report, observation    Patient's response to intervention:  The patient's response to intervention is accepting.    Progress toward goals:  The patient's progress toward goals is good.                Medical ROS  Review of Systems   Constitutional:  Negative for chills and fever.   HENT:  Negative for hearing loss.    Eyes:  Negative for blurred vision and double vision.   Respiratory:  Negative for shortness of breath.    Cardiovascular:  Negative for chest pain and palpitations.   Gastrointestinal:  Negative for constipation, diarrhea, nausea and vomiting.   Genitourinary:  Negative for dysuria and urgency.   Musculoskeletal:  Negative for joint pain.   Skin:  Negative for rash.   Neurological:  Negative for dizziness and headaches.   Endo/Heme/Allergies:  Negative for environmental allergies.         PAST MEDICAL HISTORY   Past Medical History:   Diagnosis Date    Addiction to drug     Alcohol abuse     last drink 9/06/22    Anxiety     Bipolar disorder     COPD (chronic obstructive pulmonary disease)     Depression     Disc disorder     Fatigue     Headache     History of psychiatric hospitalization     Hx of psychiatric care     Hypertension 6/13/2024    Panic disorder     Psychiatric problem     PTSD (post-traumatic stress disorder)     Sleep difficulties     Suicide attempt     Therapy     Withdrawal symptoms, alcohol            PSYCHOTROPIC MEDICATIONS   Scheduled Meds:   ARIPiprazole  15 mg Oral Daily    EScitalopram oxalate  20 mg Oral Daily    folic acid  1 mg Oral Daily    gabapentin  200 mg Oral TID    lisinopriL  10 mg Oral Daily    mirtazapine  15 mg Oral Nightly    multivitamin  1 tablet Oral Daily    naltrexone  25 mg Oral Daily    nicotine  1 patch Transdermal Daily    pantoprazole  40 mg Oral Daily    thiamine  100 mg Oral Daily     Continuous Infusions:  PRN Meds:.  Current Facility-Administered Medications:     acetaminophen, 650 mg, Oral, Q6H PRN    albuterol, 2 puff, Inhalation, Q6H PRN  **AND** MDI Q6H PRN, , , Q6H PRN    hydrOXYzine pamoate, 50 mg, Oral, Q6H PRN    LORazepam, 1 mg, Oral, Q6H PRN    melatonin, 6 mg, Oral, Nightly PRN    ondansetron, 4 mg, Oral, Q8H PRN    simethicone, 1 tablet, Oral, TID PRN        EXAMINATION    VITALS   Vitals:    06/18/24 0701 06/18/24 1910 06/19/24 0706 06/19/24 0800   BP: 126/75 126/76 114/76    BP Location: Right arm Right arm Left arm    Patient Position: Sitting Sitting Sitting    Pulse: 99 93 92    Resp: 15 18 18    Temp: 97.4 °F (36.3 °C) 97.8 °F (36.6 °C) 97.3 °F (36.3 °C)    TempSrc:  Oral Tympanic    SpO2: 98% 99% 100%    Weight:    86 kg (189 lb 7.8 oz)   Height:           Body mass index is 25.7 kg/m².        CONSTITUTIONAL  General Appearance: unremarkable, age appropriate    MUSCULOSKELETAL  Muscle Strength and Tone:no tic, no tremor  Abnormal Involuntary Movements: None  Gait and Station: non-ataxic    PSYCHIATRIC   Level of Consciousness: awake and alert   Orientation: person, place,time and situation  Grooming: Hospital garb and Well groomed  Psychomotor Behavior: normal, cooperative  Speech: normal tone, normal rate, normal pitch, normal volume  Language: grossly intact, able to name, able to repeat  Mood: alright  Affect: Consistent with mood  Thought Process: linear, logical  Associations: intact   Thought Content: denies SI, denies HI, and no delusions  Perceptions: denies AH, denies  VH, no TH, and not RIS  Memory: Able to recall past events, Remote intact, and Recent intact  Attention:Attends to interview without distraction  Fund of Knowledge: Aware of current events and Vocabulary appropriate   Estimate if Intelligence:  Average based on work/education history, vocabulary and mental status exam  Insight: has awareness of illness  Judgment: behavior is adequate to circumstances        DIAGNOSTIC TESTING   Laboratory Results  Recent Results (from the past 24 hour(s))   Basic metabolic panel    Collection Time: 06/18/24  9:40 AM   Result  Value Ref Range    Sodium 137 136 - 145 mmol/L    Potassium 4.8 3.5 - 5.1 mmol/L    Chloride 102 95 - 110 mmol/L    CO2 26 23 - 29 mmol/L    Glucose 78 70 - 110 mg/dL    BUN 16 6 - 20 mg/dL    Creatinine 0.9 0.5 - 1.4 mg/dL    Calcium 9.9 8.7 - 10.5 mg/dL    Anion Gap 9 8 - 16 mmol/L    eGFR >60 >60 mL/min/1.73 m^2   Magnesium    Collection Time: 06/18/24  9:40 AM   Result Value Ref Range    Magnesium 1.5 (L) 1.6 - 2.6 mg/dL           MEDICAL DECISION MAKING          ASSESSMENT         MDD, recurrent, severe, without psychotic features   Unspecified psychosis  SI  Anxiety Disorder NOS  PTSD  Alcohol abuse    Psychosocial stressors        PROBLEM LIST AND MANAGEMENT PLANS           MDD  - continue lexapro 20 mg pO qd  - resume abilify 5 mg PO qd  -increase to 7 mg PO qd -increase to 10 mg PO qd -increase to/continue 15 mg PO qd  - started/continue remeron 15 mg PO qhs   - pt counseled  - follow up with outpatient mental health providers after discharge for medication management and psychotherapy        Psychosis  - r/o SIPD  - Abilify as above  - pt counseled  - follow up with outpatient mental health providers after discharge for medication management and psychotherapy     Suicidal ideations  - continue psychiatric hospitalization  - provide psychotherapeutic interventions and medication management     Anxiety  - started/continue hydroxyzine 50 mg PO q 6 hours PRN  - lexapro as above  - pt counseled  - follow up with outpatient mental health providers after discharge for medication management and psychotherapy     PTSD   - lexapro as above  - pt counseled  - follow up with outpatient mental health providers after discharge for medication management and psychotherapy     Alcohol abuse  Alcohol Brief Intervention     Discussed with patient that there is concern he/she is drinking at unhealthy levels known to increase his/her risk of alcohol-related health problems - Yes  Discussed general feedback on health risks  associated with drinking and/or given personalized feedback - Yes  Patient was advised to abstain from alcohol use - Yes     - change valium 10 mg PO q 4 hours to 20 mg PO TID  -decrease to 20 mg/10/10 mg - decrease to 10 mg PO TID -decrease to BID -decrease to qd today then stop; taper  completed without incident   - continue ativan 1 mg PO q 6 hours PRN for breakthrough w/d parameters  - start naltrexone 25 mg pO qd today, planning for vivitrol if PO tolerable   Will try to get Vivitrol 280 mg IM q month today (first dose on 6/19/24)  - start gabapentin 200 mg PO BID- increase to 300 mg po q TID                Discussed diagnosis, risks and benefits of proposed treatment vs alternative treatments vs no treatment, potential side effects of these treatments and the inherent unpredictability of treatment. The patient expresses understanding of the above and displays the capacity to agree with this treatment given said understanding. Patient also agrees that, currently, the benefits outweigh the risks and would like to pursue/continue treatment at this time.    Any medications being used off-label were discussed with the patient inclusive of the evidence base for the use of the medications and consent was obtained for the off-label use of the medication.       DISCHARGE PLANNING  Expected Disposition Plan: Home or Self Care- discharged tomorrow       NEED FOR CONTINUED HOSPITALIZATION  Psychiatric illness continues to pose a potential threat to life or bodily function, of self or others, thereby requiring the need for continued inpatient psychiatric hospitalization: Yes, due to: danger to self, as evidenced by:  Concerns with SI--Fading.    Protective inpatient pyschiatric hospitalization required while a safe disposition plan is enacted: Yes    Patient stabilized and ready for discharge from inpatient psychiatric unit: No        STAFF:   Francisco Benton MD  Psychiatry

## 2024-06-19 NOTE — PLAN OF CARE
"Patient reports feeling "alright" today, spending majority of time in hallway pacing and in room room with minimal interaction with staff and peers. Denies SI/HI at this time. Denies hallucinations. Appetite adequate. PRN melatonin administered to aid with sleep. PRN Vistaril administered to aid with anxiety. Remains calm and cooperative. NADN. Medication compliant. Safety precautions remain in place.   "

## 2024-06-20 VITALS
OXYGEN SATURATION: 97 % | WEIGHT: 189.5 LBS | HEART RATE: 106 BPM | HEIGHT: 72 IN | SYSTOLIC BLOOD PRESSURE: 122 MMHG | TEMPERATURE: 97 F | DIASTOLIC BLOOD PRESSURE: 80 MMHG | RESPIRATION RATE: 18 BRPM | BODY MASS INDEX: 25.67 KG/M2

## 2024-06-20 PROCEDURE — 99900035 HC TECH TIME PER 15 MIN (STAT)

## 2024-06-20 PROCEDURE — 90833 PSYTX W PT W E/M 30 MIN: CPT | Mod: AF,HB,, | Performed by: PSYCHIATRY & NEUROLOGY

## 2024-06-20 PROCEDURE — 25000003 PHARM REV CODE 250: Performed by: STUDENT IN AN ORGANIZED HEALTH CARE EDUCATION/TRAINING PROGRAM

## 2024-06-20 PROCEDURE — 99239 HOSP IP/OBS DSCHRG MGMT >30: CPT | Mod: AF,HB,, | Performed by: PSYCHIATRY & NEUROLOGY

## 2024-06-20 PROCEDURE — S4991 NICOTINE PATCH NONLEGEND: HCPCS | Performed by: STUDENT IN AN ORGANIZED HEALTH CARE EDUCATION/TRAINING PROGRAM

## 2024-06-20 PROCEDURE — 25000003 PHARM REV CODE 250: Performed by: PHYSICIAN ASSISTANT

## 2024-06-20 PROCEDURE — 25000003 PHARM REV CODE 250: Performed by: PSYCHIATRY & NEUROLOGY

## 2024-06-20 RX ORDER — ARIPIPRAZOLE 15 MG/1
15 TABLET ORAL DAILY
Qty: 30 TABLET | Refills: 1 | Status: SHIPPED | OUTPATIENT
Start: 2024-06-20 | End: 2025-06-20

## 2024-06-20 RX ORDER — TALC
6 POWDER (GRAM) TOPICAL NIGHTLY PRN
Qty: 60 TABLET | Refills: 1 | Status: SHIPPED | OUTPATIENT
Start: 2024-06-20

## 2024-06-20 RX ORDER — ESCITALOPRAM OXALATE 20 MG/1
20 TABLET ORAL DAILY
Qty: 30 TABLET | Refills: 1 | Status: SHIPPED | OUTPATIENT
Start: 2024-06-20 | End: 2025-06-20

## 2024-06-20 RX ORDER — MIRTAZAPINE 15 MG/1
15 TABLET, ORALLY DISINTEGRATING ORAL NIGHTLY
Qty: 30 TABLET | Refills: 1 | Status: SHIPPED | OUTPATIENT
Start: 2024-06-20 | End: 2025-06-20

## 2024-06-20 RX ORDER — HYDROXYZINE PAMOATE 50 MG/1
50 CAPSULE ORAL NIGHTLY PRN
Qty: 60 CAPSULE | Refills: 1 | Status: SHIPPED | OUTPATIENT
Start: 2024-06-20

## 2024-06-20 RX ORDER — IBUPROFEN 200 MG
1 TABLET ORAL DAILY
COMMUNITY
Start: 2024-06-20

## 2024-06-20 RX ORDER — GABAPENTIN 300 MG/1
300 CAPSULE ORAL 3 TIMES DAILY
Qty: 90 CAPSULE | Refills: 1 | Status: SHIPPED | OUTPATIENT
Start: 2024-06-20 | End: 2025-06-20

## 2024-06-20 RX ADMIN — THERA TABS 1 TABLET: TAB at 08:06

## 2024-06-20 RX ADMIN — LISINOPRIL 10 MG: 10 TABLET ORAL at 08:06

## 2024-06-20 RX ADMIN — ESCITALOPRAM 20 MG: 20 TABLET, FILM COATED ORAL at 08:06

## 2024-06-20 RX ADMIN — GABAPENTIN 300 MG: 300 CAPSULE ORAL at 08:06

## 2024-06-20 RX ADMIN — FOLIC ACID 1 MG: 1 TABLET ORAL at 08:06

## 2024-06-20 RX ADMIN — Medication 100 MG: at 08:06

## 2024-06-20 RX ADMIN — ARIPIPRAZOLE 15 MG: 10 TABLET ORAL at 08:06

## 2024-06-20 RX ADMIN — SIMETHICONE 80 MG: 80 TABLET, CHEWABLE ORAL at 04:06

## 2024-06-20 RX ADMIN — PANTOPRAZOLE SODIUM 40 MG: 40 TABLET, DELAYED RELEASE ORAL at 08:06

## 2024-06-20 RX ADMIN — NICOTINE 1 PATCH: 21 PATCH, EXTENDED RELEASE TRANSDERMAL at 08:06
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
## 2024-06-20 NOTE — PROGRESS NOTES
Psychotherapy:  Target symptoms: depression, anxiety , substance abuse  Why chosen therapy is appropriate versus another modality: relevant to diagnosis  Outcome monitoring methods: self-report  Therapeutic intervention type: supportive psychotherapy  Topics discussed/themes: building skills sets for symptom management, symptom recognition, substance abuse  Safety planning and wrap up session   The patient's response to the intervention is accepting. The patient's progress toward treatment goals is fair.   Duration of intervention: 16 minutes.  Francisco Benton MD  Psychiatry

## 2024-06-20 NOTE — CARE UPDATE
[facility]  Encounter Date: 2024  3:58 PM    Discharge Date No discharge date for patient encounter.   Hospital Account: [id number]    MRN: [id number]   Guarantor: KEVIN ANAYA III   Contact Serial #: [id number]         ENCOUNTER             Patient Class: IP Psych   Unit: [facility]*   Hospital Service: Psychiatry   Bed: 209   Admitting Provider: Adalberto Oneal Iii, Md   Referring Physician: Adalberto Oneal III   Attending Provider: Adalberto Oneal Iii, Md   Adm Diagnosis: Suicidal ideations [R45.*      PATIENT                  Name: KEVIN ANAYA III : 1970 (53 yrs)    Address: [address] [place] [address] Sex: Male    City: [place] [address]        Primary Care Provider: Manisha Hopkins*         Primary Phone: [phone]   EMERGENCY CONTACT   Contact Name Legal Guardian? Relationship to Patient Home Phone Work Phone Mobile Phone    1. Preethi Jimenez  2. *No Contact Specified* Yes    Significant other              [phone]       GUARANTOR                  Guarantor: KEVIN ANAYA III       : 1970   Address: [address] [place] [address]    Sex: Male     [place], [place] [address] Guarantor  Type: B/H   Relation to Patient: Self         Home Phone: [phone]   Guarantor ID: [id number]         Work Phone:     GUARANTOR EMPLOYER     Employer:             Status: NOT EMPLO*      COVERAGE          PRIMARY INSURANCE   Payor / Plan: MEDICAID/AETNA [facility]       Group Number:         Subscriber Name: KEVIN ANAYA III Subscriber : 1970   Subscriber ID: [id number] Pat. Rel. to Subscriber: Self   Insurance Address: [address] [facility] [address]  [place] [address]       SECONDARY INSURANCE   Payor / Plan: - No Secondary Coverage -       Group Number:         Subscriber Name:   Subscriber :     Subscriber ID:   Pat. Rel. to Subscriber:     Insurance Address:            Contact Serial # ([id number])         2024    Chart ID ([id number])

## 2024-06-20 NOTE — NURSING
Rested quietly with closed eyes and even resp for 5.25 hours.  Modified VC and all precautions maintained.  Pathways clear and bed in low position.

## 2024-06-20 NOTE — NURSING
Pt discharge arranged for today.  All belongings accounted for and will be returned upon discharge.  AVS and meds reviewed with pt, understanding verbalized.  Denies any S/I or H/I at this time.  Mood stable.  Transportation arranged for pt.

## 2024-06-20 NOTE — PROGRESS NOTES
"   06/20/24 1000   [facility] Therapy   Group Name Therapeutic Recreation   Specific Interventions Cognitive Stimulation Training   Participation Level Appropriate;Attentive   Participation Quality Cooperative;Social   Insight/Motivation Applies New Skills;Good   Affect/Mood Display Appropriate   Cognition Alert   Psychomotor WNL     Patient presents calm, cooperative, reports a great mood, "I'm going home to take care of my wife. Patient identified coping skills of walking, watching sports, taking his medicine and keeping busy.  "

## 2024-06-20 NOTE — PSYCH
Patient will be following up with [facility] [address] [address] [id number] [phone]. Appointment is on July 2, 2024@ 10:00 a.m.. Patient will receive tobacco cessation therapy along with substance abuse  therapy due to positive UDS upon admit. AVS faxed on 06/20/2024 @ 9:15 a.m.. Transportation has been arranged with [employer] [phone]. Trip number [id number]. Please allow the three hour window. Representative states  the three hour window will be at 12:15 p.m.. Representative suggested to call back if ride has not arrived with int the three hour window.

## 2024-06-20 NOTE — PLAN OF CARE
Denies suicidal thoughts at this time.  Calm and cooperative.  Interacting very well with staff and peers.  Said he's looking forward to going home tomorrow.  Encouraged increased fluids and regular meals.  VS stable.  Accepted hs medications.  Stressed compliance with prescribed medications upon discharge.

## 2024-06-20 NOTE — CARE UPDATE
DISCHARGE INSTRUCTIONS  Maicol Millan III MRN: [id number]     Major depressive disorder   6/11/2024 - 6/20/2024   [facility]   Your Next Steps (Patient should check each box as tasks are completed)   Ask      Ask how to get these medications  naltrexone microspheres   Do       6 medications from [facility] [place], [place] - [address] [address]     these medications from any pharmacy  nicotine   Read      Read 7 attachments  Patient Portal  We want you to be involved with your health care. Our patient portal, called [facility], is a secure, online website for convenient 24-hour access to your personal health information.     With [facility], you can view your after visit summary, schedule appointments, request prescription refills, view test results, communicate with your health care providers, and make payments online at [email or web address]           Instructions    Your medications have changed   START taking:  EScitalopram oxalate (LEXAPRO)   gabapentin (NEURONTIN)   melatonin (MELATIN)   mirtazapine (REMERON SOL-TAB)   naltrexone microspheres (VIVITROL)   Start taking on: July 19, 2024  nicotine (NICODERM CQ)   This replaces a similar medication. See the full medication list for instructions.   CHANGE how you take:  ARIPiprazole (ABILIFY)   hydrOXYzine pamoate (VISTARIL)    STOP taking:  cetirizine 10 MG tablet (ZYRTEC)   diazePAM 10 MG Tab (VALIUM)   fluticasone propionate 110 mcg/actuation inhaler (FLOVENT HFA)   haloperidoL 10 MG tablet (HALDOL)   lisinopriL 10 MG tablet   nicotine 21 mg/24 hr (NICODERM CQ)   Replaced by a similar medication.  pantoprazole 40 MG tablet (PROTONIX)   thiamine 100 MG tablet   tiotropium-olodateroL 2.5-2.5 mcg/actuation Mist (STIOLTO RESPIMAT)   Review your updated medication list below.    Referrals  Referral to Pulmonology  Complete by: Jun 25, 2024  What is the reason for visit?:  COPD/Emphysema  Referred to Region: Only select  region(s) you would like the patient to be seen in if it is outside of the current encounter's department.:  [facility] follow up?:  Yes  Referral to ENT  Complete by: Jun 27, 2024  What kind of problem are you experiencing?:  Ear Comment - vertigo  What best describes your problem?:  Dizziness/Vertigo  Referral to Neurology  Complete by: Jun 27, 2024  What is the reason for visit?:  General Neuro (Tremors, Imbalance, Trouble Walking, Numbness, Trouble Speaking) Comment - dizzy spells/vertigo  What is the reason for visit?:  None of the Above/Symptom not Listed  Referral to Pulmonology  Complete by: Jun 27, 2024  What is the reason for visit?:  Mass Comment - (pulmonary nodules)  Hospital follow up?:  No  Your care is important to us. If your provider recommended a follow-up appointment or test, we are happy to help you coordinate your recommended care. It is important that you complete your recommended follow-up.  If you need help scheduling, please call [phone]. Appointments can also be made online through the patient portal.      While scheduling and attending your appointments is your responsibility, our goal is to support and empower you throughout that process.         Why you were hospitalized  Your primary diagnosis was: Major Depressive Disorder   Your diagnoses also included: Stage 2 Moderate Copd By Gold Classification, Alcohol Withdrawal, Hypertension     You are allergic to the following  You are allergic to the following  Allergen Reactions   Diphenoxylate-Atropine Shortness Of Breath  Other (See Comments)   Esophagus tightens     Your Latest Vitals    Blood Pressure 122/80       BMI 25.70       Weight 189 lb 7.8 oz       Height 6'       Temperature 97.1 °F       Pulse 106       Respiration 18       Oxygen Saturation 97%       BSA 2.09 m²  Treatment Team  Chat With All Treatment Team   Provider Role Specialty    Adalberto Oneal III, MD  Attending Psychiatry    Adalberto Oneal  "III, MD  Admitting Psychiatry     Recent Lab Values   Include labs without results  Most Recent Result    A1C  5.2  06/06 1253  Most Recent 8 Results  Show dates as rows  Behavioral Health Safety Plan          Step 1: Warning Signs:     I start to chain smoke".     Pt stated" I get really nervous".     Pt stated" I get irritated and aggitated more easily lately".       Step 2: Internal coping strategies - Things I can do to take my mind off my problems without contacting another person:     Pt stated" I go for a walk".     Pt stated" I Isolate myself from the situation".     Pt stated" I talk to a very close friend".       Step 3: People and social settings that provide distraction:     Name: Maicol Millan Jr/father Phone: In cell phone     Name: Preethi Jimenez/wife Phone: [phone]     Place: Pt stated" I talk to my father on the phone".     Place: Pt stated" I meet her at home".       Step 4: People whom I can ask for help:     Name: Preethi Jimenez/wife Phone: [phone]     Name: Maicol Millan Jr/father Phone: In cell phone     Name: Red/friend Phone: In cell phone       Step 5: Professionals or agencies I can contact during a crisis:     Clinician Name: [facility] Phone: [phone]     Clinician Pager or Emergency Contact #: [phone]           Clinician Name: [facility] Phone: [phone]     Clinician Pager or Emergency Contact #: [phone]           Suicide Prevention Lifeline: 988 or 0-968-378-VHVS (2895)           Local Emergency Service: [facility]     Emergency Services Address: [address] [place] [address]     Emergency Services Phone: [phone] [place] Crisis Line, [place] Line, [place] Crisis and Suicide Hotline       Step 6: Making the environment safer:     Pt stated" Not to drink alcohol".   2. Pt stated" My family".         Used with permission from BRITNI Durand & CATHERINE Mccauley. (2011). Safety planning intervention: A brief intervention to " mitigate suicide risk. [facility] Behavioral [facility]. [phone]            [facility]  [facility] Nurse Care Line - 24/7 Assistance  Unless otherwise directed by your provider, please contact [facility], our nurse care line that is available for 24/7 assistance. Please refer to the Patient Instructions section of your After Visit Summary for specific instructions from your physician.     Registered nurses in the [facility] Center provide appointment scheduling, clinical advisement, health education, and other advisory services.  Call: [phone] (toll free).  Advance Directives  An advance directive is a document which, in the event you are no longer able to make decisions for yourself, tells your healthcare team what kind of treatment you do or do not want to receive, or who you would like to make those decisions for you.  If you do not currently have an advance directive, [facility] encourages you to create one.  For more information call:  [phone] ([phone]), [phone] ([phone]),  or log on to [email or web address].  Smoking Cessation  If you would like to quit smoking:  You may be eligible for free services if you are a [place] or [place] resident Call [facility] at ([phone].  Contact us via email: [email or web address]  View our website for more information: [email or web address]  Language Assistance Services  ATTENTION: Language assistance services are available, free of charge. Please call [phone].       ATENCIÓN: Si habla español, tiene a mcdaniel disposición servicios gratuitos de asistencia lingüística. Llame al [phone].     CHÚ Ý: N?u b?n nói Ti?ng Vi?t, có các d?ch v? h? tr? ngôn ng? mi?n phí dành cho b?n. G?i s? [phone].  National Suicide Prevention Lifeline  If you or someone you know is thinking about suicide, call the National Suicide Prevention Lifeline using the 3 digit phone number of 780 or 0-967-403-WKDE (9302).  The  lifeline is free, confidential and always available.  Help a loved one, a friend or yourself.  www.suicidepreventionlifeline.org     Medication list    START taking these medications  START taking these medications    Additional info         EScitalopram oxalate 20 MG tablet  Commonly known as: LEXAPRO  Quantity: 30 tablet  Refills: 1  Dose: 20 mg Last time this was given: June 20, 2024  8:14 AM  Take 1 tablet (20 mg total) by mouth once daily. Begin Date AM Noon PM Bedtime    gabapentin 300 MG capsule  Commonly known as: NEURONTIN  Quantity: 90 capsule  Refills: 1  Dose: 300 mg Last time this was given: June 20, 2024  8:14 AM  Take 1 capsule (300 mg total) by mouth 3 (three) times daily. Begin Date AM Noon PM Bedtime    melatonin 3 mg tablet  Commonly known as: MELATIN  Quantity: 60 tablet  Refills: 1  Dose: 6 mg Last time this was given: June 19, 2024  8:09 PM  Take 2 tablets (6 mg total) by mouth nightly as needed for Insomnia. Begin Date AM Noon PM Bedtime    mirtazapine 15 MG disintegrating tablet  Commonly known as: REMERON SOL-TAB  Quantity: 30 tablet  Refills: 1  Dose: 15 mg Last time this was given: June 19, 2024  8:09 PM  Take 1 tablet (15 mg total) by mouth nightly. Begin Date AM Noon PM Bedtime    naltrexone microspheres 380 mg Ssrr kit  Commonly known as: VIVITROL  Refills: 0  Dose: 380 mg Start taking on: July 19, 2024  Last time this was given: June 19, 2024 10:30 AM  Inject 1 each (380 mg total) into the muscle every 30 days. Begin Date AM Noon PM Bedtime    nicotine 14 mg/24 hr  Commonly known as: NICODERM CQ  Refills: 0  Dose: 1 patch  Replaces: nicotine 21 mg/24 hr Last time this was given: Ask your nurse or doctor  Place 1 patch onto the skin once daily. Begin Date AM Noon PM Bedtime     CHANGE how you take these medications  CHANGE how you take these medications    Additional info         ARIPiprazole 15 MG Tab  Commonly known as: ABILIFY  Quantity: 30 tablet  Refills: 1  Dose: 15 mg  What  changed:  medication strength  how much to take Last time this was given: June 20, 2024  8:13 AM  Take 1 tablet (15 mg total) by mouth once daily. Begin Date AM Noon PM Bedtime    hydrOXYzine pamoate 50 MG Cap  Commonly known as: VISTARIL  Quantity: 60 capsule  Refills: 1  Dose: 50 mg  What changed: reasons to take this Last time this was given: June 19, 2024  8:09 PM  Take 1 capsule (50 mg total) by mouth nightly as needed (insomnia (may tale 1 or 2 caps as needed for sleep)). Begin Date AM Noon PM Bedtime     CONTINUE taking these medications  CONTINUE taking these medications    Additional info         albuterol 90 mcg/actuation inhaler  Commonly known as: PROVENTIL/VENTOLIN HFA  Quantity: 18 g  Refills: 6  Dose: 2 puff Inhale 2 puffs into the lungs every 4 (four) hours as needed for Wheezing or Shortness of Breath. Begin Date AM Noon PM Bedtime     STOP taking these medications  STOP taking these medications   cetirizine 10 MG tablet  Commonly known as: ZYRTEC    diazePAM 10 MG Tab  Commonly known as: VALIUM    fluticasone propionate 110 mcg/actuation inhaler  Commonly known as: FLOVENT HFA    haloperidoL 10 MG tablet  Commonly known as: HALDOL    lisinopriL 10 MG tablet    nicotine 21 mg/24 hr  Commonly known as: NICODERM CQ  Replaced by: nicotine 14 mg/24 hr    pantoprazole 40 MG tablet  Commonly known as: PROTONIX    thiamine 100 MG tablet    tiotropium-olodateroL 2.5-2.5 mcg/actuation Mist  Commonly known as: STIOLTO RESPIMAT            Where to  your medications     these medications at [facility] [address] - [place] - [address] [place] [address]  ARIPiprazole  EScitalopram oxalate  gabapentin  hydrOXYzine pamoate  melatonin  mirtazapine  Address: [id number] [place] [address], [place] [address]   Phone: [phone]        these medications from any pharmacy  You don't need a prescription for these medications   nicotine 14 mg/24 hr

## 2024-07-08 PROBLEM — R11.2 NAUSEA AND VOMITING: Status: RESOLVED | Noted: 2024-06-07 | Resolved: 2024-07-08

## 2024-07-31 ENCOUNTER — TELEPHONE (OUTPATIENT)
Dept: SMOKING CESSATION | Facility: CLINIC | Age: 54
End: 2024-07-31
Payer: MEDICAID

## 2024-07-31 NOTE — TELEPHONE ENCOUNTER
Spoke with patient regarding smoking cessation program. Patient states that he is no longer interested. Encouraged to contact [facility] when ready. Contact information provided.